# Patient Record
Sex: MALE | Race: WHITE | NOT HISPANIC OR LATINO | Employment: OTHER | URBAN - METROPOLITAN AREA
[De-identification: names, ages, dates, MRNs, and addresses within clinical notes are randomized per-mention and may not be internally consistent; named-entity substitution may affect disease eponyms.]

---

## 2021-11-08 ENCOUNTER — APPOINTMENT (OUTPATIENT)
Dept: GENERAL RADIOLOGY | Facility: HOSPITAL | Age: 71
End: 2021-11-08

## 2021-11-08 ENCOUNTER — HOSPITAL ENCOUNTER (INPATIENT)
Facility: HOSPITAL | Age: 71
LOS: 9 days | Discharge: NURSING, REHAB OR LONG-TERM CARE | End: 2021-11-17
Attending: EMERGENCY MEDICINE | Admitting: HOSPITALIST

## 2021-11-08 DIAGNOSIS — E87.20 LACTIC ACIDOSIS: ICD-10-CM

## 2021-11-08 DIAGNOSIS — I95.9 HYPOTENSION, UNSPECIFIED HYPOTENSION TYPE: ICD-10-CM

## 2021-11-08 DIAGNOSIS — N17.9 AKI (ACUTE KIDNEY INJURY) (HCC): Primary | ICD-10-CM

## 2021-11-08 DIAGNOSIS — E87.0 HYPERNATREMIA: ICD-10-CM

## 2021-11-08 DIAGNOSIS — A41.9 SEPSIS, DUE TO UNSPECIFIED ORGANISM, UNSPECIFIED WHETHER ACUTE ORGAN DYSFUNCTION PRESENT (HCC): ICD-10-CM

## 2021-11-08 DIAGNOSIS — R77.8 ELEVATED TROPONIN: ICD-10-CM

## 2021-11-08 LAB
ALBUMIN SERPL-MCNC: 5.1 G/DL (ref 3.5–5.2)
ALBUMIN/GLOB SERPL: 1.2 G/DL
ALP SERPL-CCNC: 112 U/L (ref 39–117)
ALT SERPL W P-5'-P-CCNC: 112 U/L (ref 1–41)
AMMONIA BLD-SCNC: 26 UMOL/L (ref 16–60)
ANION GAP SERPL CALCULATED.3IONS-SCNC: 25.4 MMOL/L (ref 5–15)
AST SERPL-CCNC: 61 U/L (ref 1–40)
BACTERIA UR QL AUTO: ABNORMAL /HPF
BASOPHILS # BLD AUTO: 0.04 10*3/MM3 (ref 0–0.2)
BASOPHILS NFR BLD AUTO: 0.3 % (ref 0–1.5)
BILIRUB SERPL-MCNC: 2.9 MG/DL (ref 0–1.2)
BILIRUB UR QL STRIP: ABNORMAL
BUN SERPL-MCNC: 110 MG/DL (ref 8–23)
BUN/CREAT SERPL: 21.9 (ref 7–25)
CALCIUM SPEC-SCNC: 11.3 MG/DL (ref 8.6–10.5)
CHLORIDE SERPL-SCNC: 125 MMOL/L (ref 98–107)
CLARITY UR: CLEAR
CO2 SERPL-SCNC: 19.6 MMOL/L (ref 22–29)
COLOR UR: ABNORMAL
CREAT SERPL-MCNC: 5.03 MG/DL (ref 0.76–1.27)
D-LACTATE SERPL-SCNC: 4.3 MMOL/L (ref 0.5–2)
D-LACTATE SERPL-SCNC: 5.3 MMOL/L (ref 0.5–2)
DEPRECATED RDW RBC AUTO: 52.2 FL (ref 37–54)
EOSINOPHIL # BLD AUTO: 0 10*3/MM3 (ref 0–0.4)
EOSINOPHIL NFR BLD AUTO: 0 % (ref 0.3–6.2)
ERYTHROCYTE [DISTWIDTH] IN BLOOD BY AUTOMATED COUNT: 16.6 % (ref 12.3–15.4)
GFR SERPL CREATININE-BSD FRML MDRD: 11 ML/MIN/1.73
GFR SERPL CREATININE-BSD FRML MDRD: ABNORMAL ML/MIN/{1.73_M2}
GLOBULIN UR ELPH-MCNC: 4.2 GM/DL
GLUCOSE SERPL-MCNC: 146 MG/DL (ref 65–99)
GLUCOSE UR STRIP-MCNC: NEGATIVE MG/DL
HCT VFR BLD AUTO: 70.8 % (ref 37.5–51)
HGB BLD-MCNC: 22.5 G/DL (ref 13–17.7)
HGB UR QL STRIP.AUTO: ABNORMAL
HYALINE CASTS UR QL AUTO: ABNORMAL /LPF
IMM GRANULOCYTES # BLD AUTO: 0.02 10*3/MM3 (ref 0–0.05)
IMM GRANULOCYTES NFR BLD AUTO: 0.2 % (ref 0–0.5)
KETONES UR QL STRIP: NEGATIVE
LEUKOCYTE ESTERASE UR QL STRIP.AUTO: ABNORMAL
LYMPHOCYTES # BLD AUTO: 1.53 10*3/MM3 (ref 0.7–3.1)
LYMPHOCYTES NFR BLD AUTO: 13.4 % (ref 19.6–45.3)
MCH RBC QN AUTO: 30.9 PG (ref 26.6–33)
MCHC RBC AUTO-ENTMCNC: 31.8 G/DL (ref 31.5–35.7)
MCV RBC AUTO: 97.3 FL (ref 79–97)
MONOCYTES # BLD AUTO: 0.64 10*3/MM3 (ref 0.1–0.9)
MONOCYTES NFR BLD AUTO: 5.6 % (ref 5–12)
NEUTROPHILS NFR BLD AUTO: 80.5 % (ref 42.7–76)
NEUTROPHILS NFR BLD AUTO: 9.23 10*3/MM3 (ref 1.7–7)
NITRITE UR QL STRIP: POSITIVE
NRBC BLD AUTO-RTO: 0 /100 WBC (ref 0–0.2)
NT-PROBNP SERPL-MCNC: 3089 PG/ML (ref 0–900)
PH UR STRIP.AUTO: 5.5 [PH] (ref 5–8)
PHOSPHATE SERPL-MCNC: 7.7 MG/DL (ref 2.5–4.5)
PLATELET # BLD AUTO: 182 10*3/MM3 (ref 140–450)
PMV BLD AUTO: 12.4 FL (ref 6–12)
POTASSIUM SERPL-SCNC: 5 MMOL/L (ref 3.5–5.2)
PROCALCITONIN SERPL-MCNC: 0.7 NG/ML (ref 0–0.25)
PROT SERPL-MCNC: 9.3 G/DL (ref 6–8.5)
PROT UR QL STRIP: ABNORMAL
QT INTERVAL: 351 MS
RBC # BLD AUTO: 7.28 10*6/MM3 (ref 4.14–5.8)
RBC # UR: ABNORMAL /HPF
REF LAB TEST METHOD: ABNORMAL
SARS-COV-2 RNA PNL SPEC NAA+PROBE: NOT DETECTED
SODIUM SERPL-SCNC: 170 MMOL/L (ref 136–145)
SP GR UR STRIP: 1.03 (ref 1–1.03)
SQUAMOUS #/AREA URNS HPF: ABNORMAL /HPF
TROPONIN T SERPL-MCNC: 0.07 NG/ML (ref 0–0.03)
UROBILINOGEN UR QL STRIP: ABNORMAL
WBC # BLD AUTO: 11.46 10*3/MM3 (ref 3.4–10.8)
WBC UR QL AUTO: ABNORMAL /HPF

## 2021-11-08 PROCEDURE — 85025 COMPLETE CBC W/AUTO DIFF WBC: CPT | Performed by: EMERGENCY MEDICINE

## 2021-11-08 PROCEDURE — 82140 ASSAY OF AMMONIA: CPT | Performed by: EMERGENCY MEDICINE

## 2021-11-08 PROCEDURE — 81001 URINALYSIS AUTO W/SCOPE: CPT | Performed by: EMERGENCY MEDICINE

## 2021-11-08 PROCEDURE — 87635 SARS-COV-2 COVID-19 AMP PRB: CPT | Performed by: PHYSICIAN ASSISTANT

## 2021-11-08 PROCEDURE — 25010000002 CEFTRIAXONE PER 250 MG: Performed by: PHYSICIAN ASSISTANT

## 2021-11-08 PROCEDURE — 93010 ELECTROCARDIOGRAM REPORT: CPT | Performed by: INTERNAL MEDICINE

## 2021-11-08 PROCEDURE — 80053 COMPREHEN METABOLIC PANEL: CPT | Performed by: EMERGENCY MEDICINE

## 2021-11-08 PROCEDURE — 83880 ASSAY OF NATRIURETIC PEPTIDE: CPT | Performed by: EMERGENCY MEDICINE

## 2021-11-08 PROCEDURE — 99291 CRITICAL CARE FIRST HOUR: CPT

## 2021-11-08 PROCEDURE — 84100 ASSAY OF PHOSPHORUS: CPT | Performed by: EMERGENCY MEDICINE

## 2021-11-08 PROCEDURE — 99285 EMERGENCY DEPT VISIT HI MDM: CPT

## 2021-11-08 PROCEDURE — 71045 X-RAY EXAM CHEST 1 VIEW: CPT

## 2021-11-08 PROCEDURE — 83605 ASSAY OF LACTIC ACID: CPT | Performed by: EMERGENCY MEDICINE

## 2021-11-08 PROCEDURE — 87040 BLOOD CULTURE FOR BACTERIA: CPT | Performed by: EMERGENCY MEDICINE

## 2021-11-08 PROCEDURE — 84145 PROCALCITONIN (PCT): CPT | Performed by: EMERGENCY MEDICINE

## 2021-11-08 PROCEDURE — 84484 ASSAY OF TROPONIN QUANT: CPT | Performed by: EMERGENCY MEDICINE

## 2021-11-08 PROCEDURE — 87086 URINE CULTURE/COLONY COUNT: CPT | Performed by: EMERGENCY MEDICINE

## 2021-11-08 PROCEDURE — 93005 ELECTROCARDIOGRAM TRACING: CPT | Performed by: EMERGENCY MEDICINE

## 2021-11-08 RX ORDER — FAMOTIDINE 20 MG/1
20 TABLET, FILM COATED ORAL
Status: DISCONTINUED | OUTPATIENT
Start: 2021-11-09 | End: 2021-11-17 | Stop reason: HOSPADM

## 2021-11-08 RX ORDER — SODIUM CHLORIDE 9 MG/ML
150 INJECTION, SOLUTION INTRAVENOUS CONTINUOUS
Status: DISCONTINUED | OUTPATIENT
Start: 2021-11-08 | End: 2021-11-09

## 2021-11-08 RX ORDER — DEXTROSE MONOHYDRATE 50 MG/ML
125 INJECTION, SOLUTION INTRAVENOUS CONTINUOUS
Status: DISCONTINUED | OUTPATIENT
Start: 2021-11-08 | End: 2021-11-11

## 2021-11-08 RX ADMIN — CEFTRIAXONE 1 G: 1 INJECTION, POWDER, FOR SOLUTION INTRAMUSCULAR; INTRAVENOUS at 17:47

## 2021-11-08 RX ADMIN — SODIUM CHLORIDE 150 ML/HR: 9 INJECTION, SOLUTION INTRAVENOUS at 18:47

## 2021-11-08 RX ADMIN — SODIUM CHLORIDE 1809 ML: 9 INJECTION, SOLUTION INTRAVENOUS at 15:46

## 2021-11-08 RX ADMIN — DEXTROSE MONOHYDRATE 125 ML/HR: 50 INJECTION, SOLUTION INTRAVENOUS at 22:33

## 2021-11-08 RX ADMIN — SODIUM CHLORIDE 1000 ML: 9 INJECTION, SOLUTION INTRAVENOUS at 19:36

## 2021-11-08 NOTE — ED PROVIDER NOTES
Pt presents to the ED c/o  1 month of generalized weakness, not eating and drinking.  He has a history of autism per family.  Family reports he has not been eating or drinking anything for most a month.     On exam,   Awake and alert, appears ill but not toxic appearing.  Mucous membranes are extremely dry with cracking of the lips.  Patient is able to follow simple commands.  He is a poor historian.     Plan: Patient has severely deranged electrolytes and also acute renal failure, all likely from severe dehydration.  We will order IV fluids.  Procalcitonin is elevated so he will be given empiric antibiotics as well.  I have added a serum phosphorus as he may be at risk for refeeding syndrome.      I wore an N95 mask, face shield, and gloves during this patient encounter.  Patient also wearing a surgical mask.  Hand hygeine performed before and after seeing the patient.     Attestation:  The ARACELI and I have discussed this patient's history, physical exam, and treatment plan.  I have reviewed the documentation and personally had a face to face interaction with the patient. I affirm the documentation and agree with the treatment and plan.  The attached note describes my personal findings.            Chaz Jackson MD  11/08/21 2429

## 2021-11-08 NOTE — ED TRIAGE NOTES
Pt was brought in by ems from home for generalized weakness and low blood pressure over the last couple days. Ems was called yesterday for same but was not transported. Denies fever, but reports abnormal urine    Pt wearing mask on arrival. Staff wearing mask and goggles at time of triage.

## 2021-11-08 NOTE — ED PROVIDER NOTES
EMERGENCY DEPARTMENT ENCOUNTER    Room Number:  E669/1  Date of encounter:  11/8/2021  PCP: Brandon Rosen APRN  Historian: Patient, sister      I used full protective equipment while examining this patient.  This includes face mask, gloves and protective eyewear.  I washed my hands before entering the room and immediately upon leaving the room      HPI:  Chief Complaint: Weakness, failure to thrive  A complete HPI/ROS/PMH/PSH/SH/FH are unobtainable due to: Altered mental status    Context: Pedro Peck is a 71 y.o. male who presents to the ED c/o approximate 1 month history of generalized weakness, failure to thrive, decline in overall health.  Patient has history of autism and has chronic cognitive deficits.  Patient does apparently live by himself and can normally care for himself.  Over the past month patient has not been eating or drinking anything.  The patient himself denies any fever, chest pain, vomiting, diarrhea.  He appears cachectic and dehydrated.  The sister reports he went to his primary care physician 2 weeks ago and was started on an antidepressant.  Paroxetine is the only medication that he takes.    Review of Medical Records  No previous pertinent medical records found in [facility].    PAST MEDICAL HISTORY  Active Ambulatory Problems     Diagnosis Date Noted   • No Active Ambulatory Problems     Resolved Ambulatory Problems     Diagnosis Date Noted   • No Resolved Ambulatory Problems     No Additional Past Medical History         PAST SURGICAL HISTORY  History reviewed. No pertinent surgical history.      FAMILY HISTORY  History reviewed. No pertinent family history.      SOCIAL HISTORY  Social History     Socioeconomic History   • Marital status: Single   Tobacco Use   • Smoking status: Never Smoker   • Smokeless tobacco: Never Used   Substance and Sexual Activity   • Alcohol use: Not Currently   • Drug use: Never   • Sexual activity: Defer         ALLERGIES  Patient has no known  allergies.        REVIEW OF SYSTEMS  Unobtainable secondary to altered mental status      PHYSICAL EXAM    I have reviewed the triage vital signs and nursing notes.    ED Triage Vitals [11/08/21 1452]   Temp Heart Rate Resp BP SpO2   98.4 °F (36.9 °C) 117 20 (!) 88/50 96 %      Temp src Heart Rate Source Patient Position BP Location FiO2 (%)   -- Monitor -- -- --       Physical Exam    GENERAL: Awake and alert, acutely ill-appearing cachectic, not distressed  HENT: head atraumatic, dry oral mucosa  EYES: Icteric sclera, EOMI  CV: regular rhythm, tachycardic rate, no murmur  RESPIRATORY: normal effort, CTA  ABDOMEN: soft, nontender  MUSCULOSKELETAL: no deformity, FROM, no calf swelling or tenderness  NEURO: alert, slurred speech, moves all extremities, follows commands  SKIN: warm, dry        LAB RESULTS  Recent Results (from the past 24 hour(s))   Comprehensive Metabolic Panel    Collection Time: 11/08/21  3:23 PM    Specimen: Blood   Result Value Ref Range    Glucose 146 (H) 65 - 99 mg/dL     (H) 8 - 23 mg/dL    Creatinine 5.03 (H) 0.76 - 1.27 mg/dL    Sodium 170 (C) 136 - 145 mmol/L    Potassium 5.0 3.5 - 5.2 mmol/L    Chloride 125 (H) 98 - 107 mmol/L    CO2 19.6 (L) 22.0 - 29.0 mmol/L    Calcium 11.3 (H) 8.6 - 10.5 mg/dL    Total Protein 9.3 (H) 6.0 - 8.5 g/dL    Albumin 5.10 3.50 - 5.20 g/dL    ALT (SGPT) 112 (H) 1 - 41 U/L    AST (SGOT) 61 (H) 1 - 40 U/L    Alkaline Phosphatase 112 39 - 117 U/L    Total Bilirubin 2.9 (H) 0.0 - 1.2 mg/dL    eGFR Non African Amer 11 (L) >60 mL/min/1.73    eGFR  African Amer      Globulin 4.2 gm/dL    A/G Ratio 1.2 g/dL    BUN/Creatinine Ratio 21.9 7.0 - 25.0    Anion Gap 25.4 (H) 5.0 - 15.0 mmol/L   Lactic Acid, Plasma    Collection Time: 11/08/21  3:23 PM    Specimen: Blood   Result Value Ref Range    Lactate 5.3 (C) 0.5 - 2.0 mmol/L   Procalcitonin    Collection Time: 11/08/21  3:23 PM    Specimen: Blood   Result Value Ref Range    Procalcitonin 0.70 (H) 0.00 - 0.25  ng/mL   Troponin    Collection Time: 11/08/21  3:23 PM    Specimen: Blood   Result Value Ref Range    Troponin T 0.074 (C) 0.000 - 0.030 ng/mL   BNP    Collection Time: 11/08/21  3:23 PM    Specimen: Blood   Result Value Ref Range    proBNP 3,089.0 (H) 0.0 - 900.0 pg/mL   CBC Auto Differential    Collection Time: 11/08/21  3:23 PM    Specimen: Blood   Result Value Ref Range    WBC 11.46 (H) 3.40 - 10.80 10*3/mm3    RBC 7.28 (H) 4.14 - 5.80 10*6/mm3    Hemoglobin 22.5 (H) 13.0 - 17.7 g/dL    Hematocrit 70.8 (H) 37.5 - 51.0 %    MCV 97.3 (H) 79.0 - 97.0 fL    MCH 30.9 26.6 - 33.0 pg    MCHC 31.8 31.5 - 35.7 g/dL    RDW 16.6 (H) 12.3 - 15.4 %    RDW-SD 52.2 37.0 - 54.0 fl    MPV 12.4 (H) 6.0 - 12.0 fL    Platelets 182 140 - 450 10*3/mm3    Neutrophil % 80.5 (H) 42.7 - 76.0 %    Lymphocyte % 13.4 (L) 19.6 - 45.3 %    Monocyte % 5.6 5.0 - 12.0 %    Eosinophil % 0.0 (L) 0.3 - 6.2 %    Basophil % 0.3 0.0 - 1.5 %    Immature Grans % 0.2 0.0 - 0.5 %    Neutrophils, Absolute 9.23 (H) 1.70 - 7.00 10*3/mm3    Lymphocytes, Absolute 1.53 0.70 - 3.10 10*3/mm3    Monocytes, Absolute 0.64 0.10 - 0.90 10*3/mm3    Eosinophils, Absolute 0.00 0.00 - 0.40 10*3/mm3    Basophils, Absolute 0.04 0.00 - 0.20 10*3/mm3    Immature Grans, Absolute 0.02 0.00 - 0.05 10*3/mm3    nRBC 0.0 0.0 - 0.2 /100 WBC   Ammonia    Collection Time: 11/08/21  3:23 PM    Specimen: Blood   Result Value Ref Range    Ammonia 26 16 - 60 umol/L   Phosphorus    Collection Time: 11/08/21  3:23 PM    Specimen: Blood   Result Value Ref Range    Phosphorus 7.7 (H) 2.5 - 4.5 mg/dL   ECG 12 Lead    Collection Time: 11/08/21  3:46 PM   Result Value Ref Range    QT Interval 351 ms   COVID-19,[facility] IN-HOUSE CEPHEID/ADY NP SWAB IN TRANSPORT MEDIA 8-12 HR TAT - Swab, Nasopharynx    Collection Time: 11/08/21  4:13 PM    Specimen: Nasopharynx; Swab   Result Value Ref Range    COVID19 Not Detected Not Detected - Ref. Range   Urinalysis With Culture If Indicated - Urine, Clean  Catch    Collection Time: 11/08/21  4:24 PM    Specimen: Urine, Clean Catch   Result Value Ref Range    Color, UA Dark Yellow (A) Yellow, Straw    Appearance, UA Clear Clear    pH, UA 5.5 5.0 - 8.0    Specific Gravity, UA 1.026 1.005 - 1.030    Glucose, UA Negative Negative    Ketones, UA Negative Negative    Bilirubin, UA Moderate (2+) (A) Negative    Blood, UA Large (3+) (A) Negative    Protein, UA 30 mg/dL (1+) (A) Negative    Leuk Esterase, UA Small (1+) (A) Negative    Nitrite, UA Positive (A) Negative    Urobilinogen, UA 1.0 E.U./dL 0.2 - 1.0 E.U./dL   Urinalysis, Microscopic Only - Urine, Clean Catch    Collection Time: 11/08/21  4:24 PM    Specimen: Urine, Clean Catch   Result Value Ref Range    RBC, UA Too Numerous to Count (A) None Seen, 0-2 /HPF    WBC, UA 6-12 (A) None Seen, 0-2 /HPF    Bacteria, UA 1+ (A) None Seen /HPF    Squamous Epithelial Cells, UA None Seen None Seen, 0-2 /HPF    Hyaline Casts, UA 3-6 None Seen /LPF    Methodology Manual Light Microscopy    STAT Lactic Acid, Reflex    Collection Time: 11/08/21  6:53 PM    Specimen: Arm, Left; Blood   Result Value Ref Range    Lactate 4.3 (C) 0.5 - 2.0 mmol/L       Ordered the above labs and independently reviewed the results.        RADIOLOGY  XR Chest 1 View    Result Date: 11/8/2021  ONE VIEW PORTABLE CHEST  HISTORY: Shortness of breath.  FINDINGS: The lungs are well-expanded and clear and the heart and hilar structures are normal. There is no acute disease.  This report was finalized on 11/8/2021 5:09 PM by Dr. Vijay Fernandez M.D.        I ordered the above noted radiological studies. Reviewed by me and discussed with radiologist.  See dictation for official radiology interpretation.      MEDICATIONS GIVEN IN ER    Medications   sodium chloride 0.9 % infusion (150 mL/hr Intravenous Currently Infusing 11/8/21 2056)   dextrose (D5W) 5 % infusion (125 mL/hr Intravenous New Bag 11/8/21 2233)   cefTRIAXone (ROCEPHIN) 1 g in sodium chloride 0.9 % 100  mL IVPB-VTB (has no administration in time range)   famotidine (PEPCID) tablet 20 mg (has no administration in time range)   sodium chloride 0.9 % bolus 1,809 mL (0 mL Intravenous Stopped 11/8/21 1845)   cefTRIAXone (ROCEPHIN) 1 g in sodium chloride 0.9 % 100 mL IVPB-VTB (0 g Intravenous Stopped 11/8/21 1844)   sodium chloride 0.9 % bolus 1,000 mL ( Intravenous Currently Infusing 11/8/21 2056)     Critical Care  Performed by: Shant Parr PA  Authorized by: Chaz Jackson MD     Critical care provider statement:     Critical care time (minutes):  33    Critical care time was exclusive of:  Separately billable procedures and treating other patients    Critical care was necessary to treat or prevent imminent or life-threatening deterioration of the following conditions:  Circulatory failure, dehydration, metabolic crisis, shock and sepsis    Critical care was time spent personally by me on the following activities:  Blood draw for specimens, development of treatment plan with patient or surrogate, discussions with consultants, evaluation of patient's response to treatment, examination of patient, interpretation of cardiac output measurements, obtaining history from patient or surrogate, ordering and performing treatments and interventions, ordering and review of laboratory studies, ordering and review of radiographic studies, pulse oximetry and re-evaluation of patient's condition        PROGRESS, DATA ANALYSIS, CONSULTS, AND MEDICAL DECISION MAKING    All labs have been independently reviewed by me.  All radiology studies have been reviewed by me and discussed with radiologist dictating the report.   EKG's independently viewed and interpreted by me.  Discussion below represents my analysis of pertinent findings related to patient's condition, differential diagnosis, treatment plan and final disposition.    I have discussed case with Dr. Jackson, emergency room physician.  He has performed his own bedside  examination and agrees with treatment plan.    ED Course as of 11/08/21 2259   Mon Nov 08, 2021   1524 Patient presents with 2-week history of progressive weakness, failure to thrive.  Patient was hypotensive and tachycardic here.  He is cachectic appearing.  Differential diagnoses include but not limited to acute renal failure, underlying malignancy, sepsis. [EE]   1545 Chest x-ray interpreted myself shows no acute infiltrate. [EE]   1606 Hemoglobin(!): 22.5  Patient severely dehydrated.  No baseline. [EE]   1607 EKG          EKG time: 1546  Rhythm/Rate: Sinus tach, 107  P waves and WI: Biatrial enlargement, normal WI interval  QRS, axis: Left axis deviation  ST and T waves: No acute ischemic changes    Interpreted Contemporaneously by me, independently viewed         [JR]   1610 Lactate(!!): 5.3 [EE]   1610 Troponin T(!!): 0.074 [EE]   1701 Sodium(!!): 170 [EE]   1701 Creatinine(!): 5.03 [EE]   1708 Recheck of patient.  His vitals have improved.  He is no longer tachycardic.  I had a discussion with patient's sister.  He does not have any advanced directives.  He is a full code at this time.  She plans to discuss this with her other brother who is an . [EE]   1721 I discussed case with Dr. Ga.  He agrees to admit the patient.  He request that we consult Dr. Farrar in nephrology. [EE]   1727 I discussed case with Dr. Finn, nephrology.  She will consult on the patient.  She requests normal saline 150 mL/hr be given. [EE]      ED Course User Index  [EE] Shant Parr PA  [JR] Chaz Jackson MD       AS OF 22:59 EST VITALS:    BP - (!) 82/44  HR - 83  TEMP - 97.8 °F (36.6 °C) (Temporal)  O2 SATS - 97%        DIAGNOSIS  Final diagnoses:   DERECK (acute kidney injury) (HCC)   Hypotension, unspecified hypotension type   Sepsis, due to unspecified organism, unspecified whether acute organ dysfunction present (HCC)   Hypernatremia   Lactic acidosis   Elevated troponin         DISPOSITION  Admitted            Shant Parr PA  11/08/21 6440

## 2021-11-08 NOTE — ED NOTES
"Per sister, patient has been sleeping 23 of 24 hours for the past 2-4 weeks.  Sister stated that patient went to his doctor and was told he had something wrong with his kidneys and that he was depressed.  Per sister, patient was started on antidepressants but he has not been taking them.  Sister stated that he has a very low IQ but lives on his own.    Pt appears emaciated, + yellow sclera, + abrasions over various areas of his body and face (possiblity of falls but patient does not know).  Per EMS, patient's bp was in the 70's for them.  EMS stated that the patient attempted to walk when they arrived at his apartment but he was found to be \"wobbly.\"    Pt in a surgical mask.    This nurse in gloves, goggles, and N95.     Diann Parks, RN  11/08/21 1346    "

## 2021-11-09 ENCOUNTER — APPOINTMENT (OUTPATIENT)
Dept: ULTRASOUND IMAGING | Facility: HOSPITAL | Age: 71
End: 2021-11-09

## 2021-11-09 LAB
ALBUMIN SERPL-MCNC: 3.5 G/DL (ref 3.5–5.2)
ANION GAP SERPL CALCULATED.3IONS-SCNC: 13.7 MMOL/L (ref 5–15)
BUN SERPL-MCNC: 107 MG/DL (ref 8–23)
BUN/CREAT SERPL: 32.4 (ref 7–25)
CALCIUM SPEC-SCNC: 8.9 MG/DL (ref 8.6–10.5)
CHLORIDE SERPL-SCNC: 126 MMOL/L (ref 98–107)
CHLORIDE UR-SCNC: 50 MMOL/L
CK SERPL-CCNC: 480 U/L (ref 20–200)
CK SERPL-CCNC: 512 U/L (ref 20–200)
CO2 SERPL-SCNC: 20.3 MMOL/L (ref 22–29)
CREAT SERPL-MCNC: 3.3 MG/DL (ref 0.76–1.27)
CREAT UR-MCNC: 157 MG/DL
DEPRECATED RDW RBC AUTO: 50.7 FL (ref 37–54)
ERYTHROCYTE [DISTWIDTH] IN BLOOD BY AUTOMATED COUNT: 13.8 % (ref 12.3–15.4)
GFR SERPL CREATININE-BSD FRML MDRD: 19 ML/MIN/1.73
GLUCOSE SERPL-MCNC: 192 MG/DL (ref 65–99)
HCT VFR BLD AUTO: 46.3 % (ref 37.5–51)
HGB BLD-MCNC: 14.8 G/DL (ref 13–17.7)
MCH RBC QN AUTO: 31.5 PG (ref 26.6–33)
MCHC RBC AUTO-ENTMCNC: 32 G/DL (ref 31.5–35.7)
MCV RBC AUTO: 98.5 FL (ref 79–97)
PHOSPHATE SERPL-MCNC: 3.6 MG/DL (ref 2.5–4.5)
PLATELET # BLD AUTO: 105 10*3/MM3 (ref 140–450)
PMV BLD AUTO: 12.2 FL (ref 6–12)
POTASSIUM SERPL-SCNC: 3.9 MMOL/L (ref 3.5–5.2)
PROT UR-MCNC: 28 MG/DL
RBC # BLD AUTO: 4.7 10*6/MM3 (ref 4.14–5.8)
SODIUM SERPL-SCNC: 160 MMOL/L (ref 136–145)
SODIUM UR-SCNC: 64 MMOL/L
WBC # BLD AUTO: 8.78 10*3/MM3 (ref 3.4–10.8)

## 2021-11-09 PROCEDURE — 80069 RENAL FUNCTION PANEL: CPT | Performed by: INTERNAL MEDICINE

## 2021-11-09 PROCEDURE — 92610 EVALUATE SWALLOWING FUNCTION: CPT

## 2021-11-09 PROCEDURE — 82550 ASSAY OF CK (CPK): CPT | Performed by: HOSPITALIST

## 2021-11-09 PROCEDURE — 82436 ASSAY OF URINE CHLORIDE: CPT | Performed by: INTERNAL MEDICINE

## 2021-11-09 PROCEDURE — 25010000002 CEFTRIAXONE PER 250 MG: Performed by: HOSPITALIST

## 2021-11-09 PROCEDURE — 82570 ASSAY OF URINE CREATININE: CPT | Performed by: INTERNAL MEDICINE

## 2021-11-09 PROCEDURE — 76775 US EXAM ABDO BACK WALL LIM: CPT

## 2021-11-09 PROCEDURE — 84156 ASSAY OF PROTEIN URINE: CPT | Performed by: INTERNAL MEDICINE

## 2021-11-09 PROCEDURE — 82550 ASSAY OF CK (CPK): CPT | Performed by: INTERNAL MEDICINE

## 2021-11-09 PROCEDURE — 84300 ASSAY OF URINE SODIUM: CPT | Performed by: INTERNAL MEDICINE

## 2021-11-09 PROCEDURE — 85027 COMPLETE CBC AUTOMATED: CPT | Performed by: HOSPITALIST

## 2021-11-09 RX ORDER — ASPIRIN 81 MG/1
81 TABLET, CHEWABLE ORAL DAILY
Status: DISCONTINUED | OUTPATIENT
Start: 2021-11-09 | End: 2021-11-17 | Stop reason: HOSPADM

## 2021-11-09 RX ADMIN — FAMOTIDINE 20 MG: 20 TABLET, FILM COATED ORAL at 07:10

## 2021-11-09 RX ADMIN — CEFTRIAXONE 1 G: 1 INJECTION, POWDER, FOR SOLUTION INTRAMUSCULAR; INTRAVENOUS at 18:33

## 2021-11-09 RX ADMIN — DEXTROSE MONOHYDRATE 175 ML/HR: 50 INJECTION, SOLUTION INTRAVENOUS at 16:44

## 2021-11-09 RX ADMIN — DEXTROSE MONOHYDRATE 175 ML/HR: 50 INJECTION, SOLUTION INTRAVENOUS at 23:18

## 2021-11-09 RX ADMIN — FAMOTIDINE 20 MG: 20 TABLET, FILM COATED ORAL at 18:33

## 2021-11-09 RX ADMIN — DEXTROSE MONOHYDRATE 125 ML/HR: 50 INJECTION, SOLUTION INTRAVENOUS at 07:54

## 2021-11-09 RX ADMIN — ASPIRIN 81 MG: 81 TABLET, CHEWABLE ORAL at 18:33

## 2021-11-09 NOTE — CONSULTS
"Adult Nutrition  Assessment/PES    Patient Name:  Pedro Peck  YOB: 1950  MRN: [id number]  Admit Date:  11/8/2021    Assessment Date:  11/9/2021    Comments:  Nutrition consult due to unintentional weight loss + consult for calorie count.  Admitted with weakness, failure to thrive, DERECK, severe dehydration, UTI, hypernatremia.  Weakness x 1 month per chart review.    Patient reports appetite improving.  He reports poor appetite over the past week or so prior to admission.  He reports UBW around 153#, but unsure how long ago this was, thinks within a month.  This is a 19# (12.4%) weight loss.  Nutrition focused physical exam completed - severe muscle wasting and fat loss noted.  MSA completed for severe malnutrition.    2 day calorie count to start today at dinner.  Estimated needs: 8847-5018 kcal/day, 73-91 grams protein/day.    Patient agreeable to Boost Plus - adding TID.    RD to continue to follow.     Reason for Assessment     Row Name 11/09/21 1542          Reason for Assessment    Reason For Assessment physician consult; calorie count order     Diagnosis renal disease; infection/sepsis; other (see comments)  no PMH; adm with weakness, FTT, DERECK, severe dehydration, UTI     Identified At Risk by Screening Criteria unintentional loss of 10 lbs or more in the past 2 mos                Nutrition/Diet History     Row Name 11/09/21 1543          Nutrition/Diet History    Typical Food/Fluid Intake 100% x 1 meal; pt reports flora improving, says it wasn't very good over the past week or so     Supplemental Drinks/Foods/Additives agreeable to trying Boost Plus vanilla - adding TID                Anthropometrics     Row Name 11/09/21 1544          Anthropometrics    Height 177.8 cm (70\")            Admit Weight    Admit Weight --  134# 11/8            Ideal Body Weight (IBW)    Ideal Body Weight (IBW) (kg) 76.48     % of Ideal Body Weight Assessment 80-90%: mild deficit  79.5%            Usual Body Weight " "(UBW)    Usual Body Weight 69.4 kg (153 lb)     % of Usual Body Weight Assessment 85-95% - mild deficit  87.6%     Weight Loss unintentional  19# (12.4%)     Weight Loss Time Frame patient unsure how long, but thinks within a month            Body Mass Index (BMI)    BMI Assessment BMI 18.5-24.9: normal  19.19                Labs/Tests/Procedures/Meds     Row Name 11/09/21 1545          Labs/Procedures/Meds    Lab Results Reviewed reviewed, pertinent     Lab Results Comments Gluc, Na, Creat, BUN, GFR            Diagnostic Tests/Procedures    Diagnostic Test/Procedure Reviewed reviewed, pertinent            Medications    Pertinent Medications Reviewed reviewed, pertinent     Pertinent Medications Comments pepcid, IVFs                Physical Findings     Row Name 11/09/21 1546          Physical Findings    Overall Physical Appearance generalized wasting; loss of subcutaneous fat; loss of muscle mass     Skin --  B=19, intact                Estimated/Assessed Needs     Row Name 11/09/21 1546 11/09/21 1544       Calculation Measurements    Weight Used For Calculations 60.8 kg (134 lb 0.6 oz) --    Height -- 177.8 cm (70\")       Estimated/Assessed Needs       KCAL/KG    KCAL/KG 30 Kcal/Kg (kcal); 35 Kcal/Kg (kcal) --    30 Kcal/Kg (kcal) 1824 --    35 Kcal/Kg (kcal) 2128 --       Protein Requirements    Weight Used For Protein Calculations 60.8 kg (134 lb 0.6 oz) --    Est Protein Requirement Amount (gms/kg) 1.5 gm protein  73-91 --    Estimated Protein Requirements (gms/day) 91.2 --       Fluid Requirements    Fluid Requirements (mL/day) 1800 --               Nutrition Prescription Ordered     Row Name 11/09/21 1547          Nutrition Prescription PO    Current PO Diet Regular                Evaluation of Received Nutrient/Fluid Intake     Row Name 11/09/21 1547          PO Evaluation    Number of Meals 1     % PO Intake 100                Malnutrition Severity Assessment     Row Name 11/09/21 1547          " Malnutrition Severity Assessment    Malnutrition Type Starvation - Related Malnutrition            Insufficient Energy Intake     Insufficient Energy Intake Findings Severe     Insufficient Energy Intake  < or equal to 50% of est. energy requirement for > or equal to 5d)            Unintentional Weight Loss     Unintentional Weight Loss Findings Severe     Unintentional Weight Loss  Weight loss greater than 5% in one month            Muscle Loss    Loss of Muscle Mass Findings Severe     [place] Region Moderate - slight depression     Clavicle Bone Region Severe - protruding prominent bone     Acromion Bone Region Moderate - acromion may slightly protrude     Dorsal Hand Region Severe - prominent depression     Patellar Region Severe - prominent bone, square looking, very little muscle definition            Fat Loss    Subcutaneous Fat Loss Findings Severe     Orbital Region  Severe - pronounced hollowness/depression, dark circles, loose saggy skin     Upper Arm Region Severe - mostly skin, very little space between folds, fingers touch            Criteria Met (Must meet criteria for severity in at least 2 of these categories: M Wasting, Fat Loss, Fluid, Secondary Signs, Wt. Status, Intake)    Patient meets criteria for  Severe Malnutrition               Problem/Interventions:   Problem 1     Row Name 11/09/21 1549          Nutrition Diagnoses Problem 1    Problem 1 Malnutrition     Etiology (related to) Factors Affecting Nutrition; Medical Diagnosis     Infectious Disease UTI     Renal DERECK     Appetite Poor     Signs/Symptoms (evidenced by) Unintended Weight Change; Report/Observation; % UBW; % IBW; Report of Mnimal PO Intake     Percent (%) IBW 79.5 %     Percent (%) UBW 87.6 %     Unintended Weight Change Loss     Number of Pounds Lost 19     Weight loss time period 1 month     Reported/Observed By Patient                Intervention Goal     Row Name 11/09/21 1247          Intervention Goal    General Maintain  nutrition; Disease management/therapy; Meet nutritional needs for age/condition     PO Maintain intake     Weight Appropriate weight gain                Nutrition Intervention     Row Name 11/09/21 1550          Nutrition Intervention    RD/Tech Action Follow Tx progress; Care plan reviewd; Encourage intake; Recommend/ordered     Recommended/Ordered Supplement                Nutrition Prescription     Row Name 11/09/21 1550          Nutrition Prescription PO    PO Prescription Begin/change supplement     Supplement Boost Plus     Supplement Frequency 3 times a day     New PO Prescription Ordered? Yes            Other Orders    Calorie Count 2 day     Calorie Count Ordered? Yes  MD consult                Education/Evaluation     Row Name 11/09/21 1570          Education    Education Will Instruct as appropriate            Monitor/Evaluation    Monitor Per protocol; PO intake; Supplement intake; Pertinent labs; Weight; Symptoms                 Electronically signed by:  Daria Riddle RD  11/09/21 15:51 EST

## 2021-11-09 NOTE — CASE MANAGEMENT/SOCIAL WORK
Discharge Planning Assessment  [facility]     Patient Name: Pedro Peck  MRN: [id number]  Today's Date: 11/9/2021    Admit Date: 11/8/2021     Discharge Needs Assessment     Row Name 11/09/21 0850       Living Environment    Lives With alone    Primary Care Provided by self    Provides Primary Care For no one, unable/limited ability to care for self    Family Caregiver if Needed sibling(s)    Family Caregiver Names Sister  ( Malena Lopez [phone])    Quality of Family Relationships helpful; involved; supportive    Able to Return to Prior Arrangements no    Living Arrangement Comments Pt lives alone in a two condo.       Resource/Environmental Concerns    Resource/Environmental Concerns none    Transportation Concerns car, none       Transition Planning    Patient/Family Anticipates Transition to inpatient rehabilitation facility    Patient/Family Anticipated Services at Transition none    Transportation Anticipated family or friend will provide       Discharge Needs Assessment    Readmission Within the Last 30 Days no previous admission in last 30 days    Equipment Currently Used at Home none    Concerns to be Addressed denies needs/concerns at this time; basic needs    Anticipated Changes Related to Illness inability to care for self    Equipment Needed After Discharge none               Discharge Plan     Row Name 11/09/21 0859       Plan    Plan CCP to follow for needs.   YOSSI Bauer RN    Patient/Family in Agreement with Plan yes    Plan Comments FACE SHEET VERIFIED/ IM LETTER SIGNED.  Spoke to pt at bedside. Pt gave CCP permission to call his sister.  Called and spoke with his sister (Malena Reyes  [phone]).   Pt's PCP is JOAQUIN Goldman. Pt lives alone in a single story house.  Pt has been independent with ADLs.  Pt gets his prescriptions at [facility] on [facility].  Pt does not have any issues affording medications.  Pt is not current with HH.  Pt has not in SNF.   Pt's  sister states she and her daughter assists pt as needed.  CCP will follow for pt discharge needs.   YOSSI Bauer RN              Continued Care and Services - Admitted Since 11/8/2021    Coordination has not been started for this encounter.          Demographic Summary     Row Name 11/09/21 0849       General Information    Admission Type inpatient    Arrived From emergency department    Required Notices Provided Important Message from Medicare    Referral Source admission list    Reason for Consult discharge planning    Preferred Language English     Used During This Interaction no               Functional Status     Row Name 11/09/21 0849       Functional Status    Usual Activity Tolerance moderate    Current Activity Tolerance poor       Functional Status, IADL    Medications independent    Meal Preparation independent    Housekeeping independent    Laundry independent    Shopping independent       Mental Status    General Appearance WDL WDL               Psychosocial    No documentation.                Abuse/Neglect    No documentation.                Legal    No documentation.                Substance Abuse    No documentation.                Patient Forms    No documentation.                   Janice Bauer, RN

## 2021-11-09 NOTE — PROGRESS NOTES
Malnutrition Severity Assessment    Patient Name:  Pedro Peck  YOB: 1950  MRN: [id number]  Admit Date:  11/8/2021    Patient meets criteria for : Severe Malnutrition    Comments:  79.5% IBW, 87.6% UBW, 19# (12.4%) weight loss x 1 month, decreased PO intake.    Nutrition focused physical exam completed and detailed in chart below.    Malnutrition Severity Assessment  Malnutrition Type: Starvation - Related Malnutrition  Malnutrition Type (last 8 hours)     Malnutrition Severity Assessment     Row Name 11/09/21 1547       Malnutrition Severity Assessment    Malnutrition Type Starvation - Related Malnutrition    Row Name 11/09/21 1547       Insufficient Energy Intake     Insufficient Energy Intake Findings Severe    Insufficient Energy Intake  < or equal to 50% of est. energy requirement for > or equal to 5d)    Row Name 11/09/21 1547       Unintentional Weight Loss     Unintentional Weight Loss Findings Severe    Unintentional Weight Loss  Weight loss greater than 5% in one month    Row Name 11/09/21 1547       Muscle Loss    Loss of Muscle Mass Findings Severe    Christian Region Moderate - slight depression    Clavicle Bone Region Severe - protruding prominent bone    Acromion Bone Region Moderate - acromion may slightly protrude    Dorsal Hand Region Severe - prominent depression    Patellar Region Severe - prominent bone, square looking, very little muscle definition    Row Name 11/09/21 1547       Fat Loss    Subcutaneous Fat Loss Findings Severe    Orbital Region  Severe - pronounced hollowness/depression, dark circles, loose saggy skin    Upper Arm Region Severe - mostly skin, very little space between folds, fingers touch    Row Name 11/09/21 1547       Criteria Met (Must meet criteria for severity in at least 2 of these categories: M Wasting, Fat Loss, Fluid, Secondary Signs, Wt. Status, Intake)    Patient meets criteria for  Severe Malnutrition                Electronically signed by:  Daria  JOHANNY Riddle  11/09/21 15:59 EST

## 2021-11-09 NOTE — H&P
"History and physical    Primary care physician  Dr. NUNES    Chief complaint  Generalized weakness  Failure to thrive    History of present illness  71-year-old white male with no medical problems and no home medication brought to the emergency room by the family with generalized weakness for 1 month failure to thrive not eating drinking for several days to week.  Patient has no fever cough chest pain shortness of breath abdominal pain nausea vomiting diarrhea.  Patient looks cachectic and work-up in ER revealed acute kidney injury with severe dehydration and hypernatremia also found to have UTI admit for management.  Patient remained fully awake alert and oriented x3.    PAST MEDICAL HISTORY   Unremarkable      PAST SURGICAL HISTORY     History reviewed. No pertinent surgical history.     FAMILY HISTORY  History reviewed. No pertinent family history.     SOCIAL HISTORY                Socioeconomic History   • Marital status: Single   Tobacco Use   • Smoking status: Never Smoker   • Smokeless tobacco: Never Used   Substance and Sexual Activity   • Alcohol use: Not Currently   • Drug use: Never   • Sexual activity: Defer         ALLERGIES  Patient has no known allergies.  No home meds     REVIEW OF SYSTEMS  Unobtainable secondary to altered mental status     PHYSICAL EXAM   Blood pressure 104/74, pulse 85, temperature 97 °F (36.1 °C), temperature source Oral, resp. rate 16, height 177.8 cm (70\"), weight 60.8 kg (134 lb 0.6 oz), SpO2 96 %.    GENERAL: Awake and alert, acutely ill-appearing cachectic, not distressed  HEENT:  Unremarkable  CV: regular rhythm, tachycardic rate, no murmur  RESPIRATORY: normal effort, CTA  ABDOMEN: soft, nontender bowel sounds positive  MUSCULOSKELETAL: no deformity, FROM, no calf swelling or tenderness  NEURO: alert, slurred speech, moves all extremities, follows commands  SKIN: warm, dry     LAB RESULTS  Lab Results (last 24 hours)     Procedure Component Value Units Date/Time    " Renal Function Panel [[id number]]  (Abnormal) Collected: 11/09/21 1101    Specimen: Blood Updated: 11/09/21 1146     Glucose 192 mg/dL       mg/dL      Creatinine 3.30 mg/dL      Sodium 160 mmol/L      Potassium 3.9 mmol/L      Chloride 126 mmol/L      CO2 20.3 mmol/L      Calcium 8.9 mg/dL      Albumin 3.50 g/dL      Phosphorus 3.6 mg/dL      Anion Gap 13.7 mmol/L      BUN/Creatinine Ratio 32.4     eGFR Non African Amer 19 mL/min/1.73     Narrative:      GFR Normal >60  Chronic Kidney Disease <60  Kidney Failure <15      CK [[id number]]  (Abnormal) Collected: 11/09/21 1101    Specimen: Blood Updated: 11/09/21 1140     Creatine Kinase 480 U/L     CBC (No Diff) [[id number]]  (Abnormal) Collected: 11/09/21 0759    Specimen: Blood Updated: 11/09/21 0831     WBC 8.78 10*3/mm3      RBC 4.70 10*6/mm3      Hemoglobin 14.8 g/dL      Hematocrit 46.3 %      MCV 98.5 fL      MCH 31.5 pg      MCHC 32.0 g/dL      RDW 13.8 %      RDW-SD 50.7 fl      MPV 12.2 fL      Platelets 105 10*3/mm3     STAT Lactic Acid, Reflex [[id number]]  (Abnormal) Collected: 11/08/21 1853    Specimen: Blood from Arm, Left Updated: 11/08/21 1940     Lactate 4.3 mmol/L     Phosphorus [[id number]]  (Abnormal) Collected: 11/08/21 1523    Specimen: Blood Updated: 11/08/21 1759     Phosphorus 7.7 mg/dL     Urinalysis, Microscopic Only - Urine, Clean Catch [[id number]]  (Abnormal) Collected: 11/08/21 1624    Specimen: Urine, Clean Catch Updated: 11/08/21 1723     RBC, UA Too Numerous to Count /HPF      WBC, UA 6-12 /HPF      Bacteria, UA 1+ /HPF      Squamous Epithelial Cells, UA None Seen /HPF      Hyaline Casts, UA 3-6 /LPF      Methodology Manual Light Microscopy    Urine Culture - Urine, Urine, Clean Catch [[id number]] Collected: 11/08/21 1624    Specimen: Urine, Clean Catch Updated: 11/08/21 1723    COVID PRE-OP / PRE-PROCEDURE SCREENING ORDER (NO ISOLATION) - Swab, Nasopharynx [[id number]]  (Normal) Collected: 11/08/21 1613    Specimen: Swab from  Nasopharynx Updated: 11/08/21 1717    Narrative:      The following orders were created for panel order COVID PRE-OP / PRE-PROCEDURE SCREENING ORDER (NO ISOLATION) - Swab, Nasopharynx.  Procedure                               Abnormality         Status                     ---------                               -----------         ------                     COVID-19,[facility]...[[id number]]  Normal              Final result                 Please view results for these tests on the individual orders.    COVID-19,[facility] CEPHEID/ADY NP SWAB IN TRANSPORT MEDIA 8-12 HR TAT - Swab, Nasopharynx [[id number]]  (Normal) Collected: 11/08/21 1613    Specimen: Swab from Nasopharynx Updated: 11/08/21 1717     COVID19 Not Detected    Narrative:      Fact sheet for providers: https://www.fda.gov/media/545300/download    Fact sheet for patients: https://www.fda.gov/media/774990/download    Test performed by PCR.    Ammonia [[id number]]  (Normal) Collected: 11/08/21 1523    Specimen: Blood Updated: 11/08/21 1710     Ammonia 26 umol/L      Comment: Specimen hemolyzed.  Results may be affected.       Urinalysis With Culture If Indicated - Urine, Clean Catch [[id number]]  (Abnormal) Collected: 11/08/21 1624    Specimen: Urine, Clean Catch Updated: 11/08/21 1708     Color, UA Dark Yellow     Appearance, UA Clear     pH, UA 5.5     Specific Gravity, UA 1.026     Glucose, UA Negative     Ketones, UA Negative     Bilirubin, UA Moderate (2+)     Blood, UA Large (3+)     Protein, UA 30 mg/dL (1+)     Leuk Esterase, UA Small (1+)     Nitrite, UA Positive     Urobilinogen, UA 1.0 E.U./dL    Comprehensive Metabolic Panel [[id number]]  (Abnormal) Collected: 11/08/21 1523    Specimen: Blood Updated: 11/08/21 1652     Glucose 146 mg/dL       mg/dL      Creatinine 5.03 mg/dL      Sodium 170 mmol/L      Potassium 5.0 mmol/L      Comment: Slight hemolysis detected by analyzer. Results may be affected.        Chloride 125 mmol/L       "CO2 19.6 mmol/L      Calcium 11.3 mg/dL      Total Protein 9.3 g/dL      Albumin 5.10 g/dL      ALT (SGPT) 112 U/L      AST (SGOT) 61 U/L      Comment: Slight hemolysis detected by analyzer. Results may be affected.        Alkaline Phosphatase 112 U/L      Total Bilirubin 2.9 mg/dL      eGFR Non African Amer 11 mL/min/1.73      Comment: <15 Indicative of kidney failure.        eGFR   Amer --     Comment: <15 Indicative of kidney failure.        Globulin 4.2 gm/dL      A/G Ratio 1.2 g/dL      BUN/Creatinine Ratio 21.9     Anion Gap 25.4 mmol/L     Narrative:      GFR Normal >60  Chronic Kidney Disease <60  Kidney Failure <15      Blood Culture - Blood, Arm, Left [[id number]] Collected: 11/08/21 1611    Specimen: Blood from Arm, Left Updated: 11/08/21 1639    Procalcitonin [[id number]]  (Abnormal) Collected: 11/08/21 1523    Specimen: Blood Updated: 11/08/21 1636     Procalcitonin 0.70 ng/mL     Narrative:      As a Marker for Sepsis (Non-Neonates):     1. <0.5 ng/mL represents a low risk of severe sepsis and/or septic shock.  2. >2 ng/mL represents a high risk of severe sepsis and/or septic shock.    As a Marker for Lower Respiratory Tract Infections that require antibiotic therapy:  PCT on Admission     Antibiotic Therapy             6-12 Hrs later  >0.5                          Strongly Recommended            >0.25 - <0.5             Recommended  0.1 - 0.25                  Discouraged                       Remeasure/reassess PCT  <0.1                         Strongly Discouraged         Remeasure/reassess PCT      As 28 day mortality risk marker: \"Change in Procalcitonin Result\" (>80% or <=80%) if Day 0 (or Day 1) and Day 4 values are available. Refer to http://www.[employer]-pct-calculator.com/    Change in PCT <=80 %   A decrease of PCT levels below or equal to 80% defines a positive change in PCT test result representing a higher risk for 28-day all-cause mortality of patients diagnosed with severe sepsis or " septic shock.    Change in PCT >80 %   A decrease of PCT levels of more than 80% defines a negative change in PCT result representing a lower risk for 28-day all-cause mortality of patients diagnosed with severe sepsis or septic shock.                Lactic Acid, Plasma [[id number]]  (Abnormal) Collected: 11/08/21 1523    Specimen: Blood Updated: 11/08/21 1609     Lactate 5.3 mmol/L     Troponin [[id number]]  (Abnormal) Collected: 11/08/21 1523    Specimen: Blood Updated: 11/08/21 1608     Troponin T 0.074 ng/mL     Narrative:      Troponin T Reference Range:  <= 0.03 ng/mL-   Negative for AMI  >0.03 ng/mL-     Abnormal for myocardial necrosis.  Clinicians would have to utilize clinical acumen, EKG, Troponin and serial changes to determine if it is an Acute Myocardial Infarction or myocardial injury due to an underlying chronic condition.       Results may be falsely decreased if patient taking Biotin.      CBC & Differential [[id number]]  (Abnormal) Collected: 11/08/21 1523    Specimen: Blood Updated: 11/08/21 1606    Narrative:      The following orders were created for panel order CBC & Differential.  Procedure                               Abnormality         Status                     ---------                               -----------         ------                     CBC Auto Differential[[id number]]        Abnormal            Final result                 Please view results for these tests on the individual orders.    CBC Auto Differential [[id number]]  (Abnormal) Collected: 11/08/21 1523    Specimen: Blood Updated: 11/08/21 1606     WBC 11.46 10*3/mm3      RBC 7.28 10*6/mm3      Hemoglobin 22.5 g/dL      Hematocrit 70.8 %      MCV 97.3 fL      MCH 30.9 pg      MCHC 31.8 g/dL      RDW 16.6 %      RDW-SD 52.2 fl      MPV 12.4 fL      Platelets 182 10*3/mm3      Neutrophil % 80.5 %      Lymphocyte % 13.4 %      Monocyte % 5.6 %      Eosinophil % 0.0 %      Basophil % 0.3 %      Immature Grans % 0.2 %       Neutrophils, Absolute 9.23 10*3/mm3      Lymphocytes, Absolute 1.53 10*3/mm3      Monocytes, Absolute 0.64 10*3/mm3      Eosinophils, Absolute 0.00 10*3/mm3      Basophils, Absolute 0.04 10*3/mm3      Immature Grans, Absolute 0.02 10*3/mm3      nRBC 0.0 /100 WBC     BNP [[id number]]  (Abnormal) Collected: 11/08/21 1523    Specimen: Blood Updated: 11/08/21 1605     proBNP 3,089.0 pg/mL     Narrative:      Among patients with dyspnea, NT-proBNP is highly sensitive for the detection of acute congestive heart failure. In addition NT-proBNP of <300 pg/ml effectively rules out acute congestive heart failure with 99% negative predictive value.    Results may be falsely decreased if patient taking Biotin.      Blood Culture - Blood, Arm, Right [[id number]] Collected: 11/08/21 1523    Specimen: Blood from Arm, Right Updated: 11/08/21 1539        Imaging Results (Last 24 Hours)     Procedure Component Value Units Date/Time    XR Chest 1 View [[id number]] Collected: 11/08/21 1614     Updated: 11/08/21 1712    Narrative:      ONE VIEW PORTABLE CHEST     HISTORY: Shortness of breath.     FINDINGS: The lungs are well-expanded and clear and the heart and hilar  structures are normal. There is no acute disease.     This report was finalized on 11/8/2021 5:09 PM by Dr. Vijay Fernandez M.D.                    ECG 12 Lead  Component   Ref Range & Units 11/8/21 1546   QT Interval   ms 351              HEART RATE= 107  bpm  RR Interval= 560  ms  TX Interval= 129  ms  P Horizontal Axis= -10  deg  P Front Axis= 78  deg  QRSD Interval= 71  ms  QT Interval= 351  ms  QRS Axis= -60  deg  T Wave Axis= 57  deg  - ABNORMAL ECG -  Sinus tachycardia  Biatrial enlargement  LAD, consider left anterior fascicular block  ST elevation, consider inferior injury  NO PRIOR TRACING AVAILABLE FOR COMPARISON             Current Facility-Administered Medications:   •  aspirin chewable tablet 81 mg, 81 mg, Oral, Daily, Fernando Ga MD  •  cefTRIAXone (ROCEPHIN)  1 g in sodium chloride 0.9 % 100 mL IVPB-VTB, 1 g, Intravenous, Q24H, Steven Ga MD  •  dextrose (D5W) 5 % infusion, 125 mL/hr, Intravenous, Continuous, Steven Ga MD, Last Rate: 125 mL/hr at 11/09/21 0754, 125 mL/hr at 11/09/21 0754  •  famotidine (PEPCID) tablet 20 mg, 20 mg, Oral, BID AC, Steven Ga MD, 20 mg at 11/09/21 0710     ASSESSMENT  Acute kidney injury  Acute UTI with sepsis  Severe dehydration  Hypernatremia  Hyperkalemia  Elevated troponin with no chest pain  Elevated blood sugar with no history of diabetes  Failure to thrive  Gastroesophageal reflux disease    PLAN  Admit  IVF  IV antibiotics after obtaining the cultures  Nutrition consult  Nephrology to follow patient  Stress ulcer DVT prophylaxis   Supportive care  Patient is full code  Discussed with nursing staff  Follow closely and further recommendation current hospital course    STEVEN GA MD

## 2021-11-09 NOTE — THERAPY EVALUATION
Acute Care - Speech Language Pathology   Swallow Treatment Note [facility]     Patient Name: Pedro Peck  : 1950  MRN: [id number]  Today's Date: 2021               Admit Date: 2021    Visit Dx:     ICD-10-CM ICD-9-CM   1. DERECK (acute kidney injury) ([facility])  N17.9 584.9   2. Hypotension, unspecified hypotension type  I95.9 458.9   3. Sepsis, due to unspecified organism, unspecified whether acute organ dysfunction present ([facility])  A41.9 038.9     995.91   4. Hypernatremia  E87.0 276.0   5. Lactic acidosis  E87.2 276.2   6. Elevated troponin  R77.8 790.6     Patient Active Problem List   Diagnosis   • DERECK (acute kidney injury) ([facility])     Past Medical History:   Diagnosis Date   • Autism      History reviewed. No pertinent surgical history.    SLP Recommendation and Plan  SLP Swallowing Diagnosis: suspected pharyngeal dysphagia (21)  SLP Diet Recommendation: soft textures, chopped, thin liquids (21)  Recommended Precautions and Strategies: upright posture during/after eating, small bites of food and sips of liquid, no straw (21)  SLP Rec. for Method of Medication Administration: meds whole, with thin liquids, with pudding or applesauce, as tolerated (21)     Monitor for Signs of Aspiration: yes (21)     Swallow Criteria for Skilled Therapeutic Interventions Met: demonstrates skilled criteria (21)  Anticipated Discharge Disposition (SLP): unknown (21)  Rehab Potential/Prognosis, Swallowing: good, to achieve stated therapy goals (21)  Therapy Frequency (Swallow): PRN (21)  Predicted Duration Therapy Intervention (Days): until discharge (21)                         Plan of Care Reviewed With: patient  Outcome Summary: Clinical swallow eval completed. Recommend mech soft chopped w/ thin; NO STRAWS; meds w/ thin or pureed; upright for meals and 30 min after; slow rate; small bites/sips. ST to  follow.    Patient was not wearing a face mask during this therapy encounter. Therapist used appropriate personal protective equipment including mask, eye protection and gloves.  Mask used was standard procedure mask. Appropriate PPE was worn during the entire therapy session. Hand hygiene was completed before and after therapy session. Patient is not in enhanced droplet precautions.     SWALLOW EVALUATION (last 72 hours)     SLP Adult Swallow Evaluation     Row Name 11/09/21 1600                   Rehab Evaluation    Document Type evaluation  -AW        Subjective Information no complaints  -AW        Patient Observations alert; cooperative; agree to therapy  -AW        Care Plan Review evaluation/treatment results reviewed; care plan/treatment goals reviewed; risks/benefits reviewed; patient/other agree to care plan; current/potential barriers reviewed  -AW        Patient Effort good  -AW        Symptoms Noted During/After Treatment none  -AW                  General Information    Patient Profile Reviewed yes  -AW        Pertinent History Of Current Problem Pt admitted with UTI w/ sepsis, weakness, dehydration, and failure to thrive. Pt has not been eating or drinking for several days. PMH: Autism  -AW        Current Method of Nutrition regular textures; thin liquids  -AW        Precautions/Limitations, Vision WFL; for purposes of eval  -AW        Precautions/Limitations, Hearing WFL; for purposes of eval  -AW        Prior Level of Function-Communication WFL  -AW        Prior Level of Function-Swallowing no diet consistency restrictions  -AW        Plans/Goals Discussed with patient; agreed upon  -AW        Barriers to Rehab none identified  -AW        Patient's Goals for Discharge return home  -AW                  Pain    Additional Documentation Pain Scale: Numbers Pre/Post-Treatment (Group)  -AW                  Pain Scale: Numbers Pre/Post-Treatment    Pretreatment Pain Rating 0/10 - no pain  -AW         Posttreatment Pain Rating 0/10 - no pain  -AW                  Oral Motor Structure and Function    Dentition Assessment natural, present and adequate; missing teeth  -AW        Secretion Management WNL/WFL  -AW        Mucosal Quality moist, healthy  -AW        Volitional Swallow WFL  -AW                  Oral Musculature and Cranial Nerve Assessment    Oral Motor General Assessment WFL  -AW                  General Eating/Swallowing Observations    Respiratory Support Currently in Use room air  -AW        Eating/Swallowing Skills fed by SLP; self-fed  -AW        Positioning During Eating upright in bed  -AW        Utensils Used spoon; cup; straw  -AW        Consistencies Trialed regular textures; soft textures; mixed consistency; pureed; thin liquids  -AW        Pre SpO2 (%) 98  -AW        Post SpO2 (%) 98  -AW                  Clinical Swallow Eval    Pharyngeal Phase suspected pharyngeal impairment  -AW        Clinical Swallow Evaluation Summary Clinical swallow eval completed. Pt tolerated cup sips of thins with no s/s. Mastication and bolus transfer functional for pureed, soft, and mixed consistencies. Pt had slow mastication with regular solid with increased transfer time. Pt tried a liquid wash w/ a straw to clear residue and choked. Feel it was a combination of the cracker residue and the fast flow of the straw. Laryngeal elevation was adequate with palpation.  -AW                  Clinical Impression    SLP Swallowing Diagnosis suspected pharyngeal dysphagia  -AW        Functional Impact risk of aspiration/pneumonia  -AW        Rehab Potential/Prognosis, Swallowing good, to achieve stated therapy goals  -AW        Swallow Criteria for Skilled Therapeutic Interventions Met demonstrates skilled criteria  -AW                  Recommendations    Therapy Frequency (Swallow) PRN  -AW        Predicted Duration Therapy Intervention (Days) until discharge  -AW        SLP Diet Recommendation soft textures; chopped;  thin liquids  -AW        Recommended Precautions and Strategies upright posture during/after eating; small bites of food and sips of liquid; no straw  -AW        Oral Care Recommendations Oral Care BID/PRN  -AW        SLP Rec. for Method of Medication Administration meds whole; with thin liquids; with pudding or applesauce; as tolerated  -AW        Monitor for Signs of Aspiration yes  -AW        Anticipated Discharge Disposition (SLP) unknown  -AW                  Swallow Goals (SLP)    Oral Nutrition/Hydration Goal Selection (SLP) oral nutrition/hydration, SLP goal 1  -AW                  Oral Nutrition/Hydration Goal 1 (SLP)    Oral Nutrition/Hydration Goal 1, SLP Pt will safely tolerate the least restrictive diet with no s/s of aspiration.  -AW        Time Frame (Oral Nutrition/Hydration Goal 1, SLP) by discharge  -AW              User Key  (r) = Recorded By, (t) = Taken By, (c) = Cosigned By    Initials Name Effective Dates    Johanna Corbin MS CCC-SLP 06/16/21 -                 EDUCATION  The patient has been educated in the following areas:   Dysphagia (Swallowing Impairment) Oral Care/Hydration Modified Diet Instruction.        SLP GOALS     Row Name 11/09/21 1600             Oral Nutrition/Hydration Goal 1 (SLP)    Oral Nutrition/Hydration Goal 1, SLP Pt will safely tolerate the least restrictive diet with no s/s of aspiration.  -AW      Time Frame (Oral Nutrition/Hydration Goal 1, SLP) by discharge  -AW            User Key  (r) = Recorded By, (t) = Taken By, (c) = Cosigned By    Initials Name Provider Type    Johanna Corbin MS CCC-SLP Speech and Language Pathologist              SLP Outcome Measures (last 72 hours)     SLP Outcome Measures     Row Name 11/09/21 1600             SLP Outcome Measures    Outcome Measure Used? Adult NOMS  -AW              Adult FCM Scores    FCM Chosen Swallowing  -AW      Swallowing FCM Score 5  -AW            User Key  (r) = Recorded By, (t) = Taken By, (c) = Cosigned By     Initials Name Effective Dates    Johanna Corbin MS CCC-SLP 06/16/21 -                  Time Calculation:    Time Calculation- SLP     Row Name 11/09/21 1642             Time Calculation- SLP    SLP Start Time 1430  -AW      SLP Received On 11/09/21  -            User Key  (r) = Recorded By, (t) = Taken By, (c) = Cosigned By    Initials Name Provider Type    Johanna Corbin MS CCC-SLP Speech and Language Pathologist                Therapy Charges for Today     Code Description Service Date Service Provider Modifiers Qty    [id number] HC ST EVAL ORAL PHARYNG SWALLOW 3 11/9/2021 Johanna Brown MS CCC-SLP GN 1               Johanna Brown MS CCC-SLP  11/9/2021

## 2021-11-09 NOTE — PLAN OF CARE
Goal Outcome Evaluation:  Plan of Care Reviewed With: patient           Outcome Summary: Clinical swallow eval completed. Recommend mech soft chopped w/ thin; NO STRAWS; meds w/ thin or pureed; upright for meals and 30 min after; slow rate; small bites/sips. ST to follow.

## 2021-11-09 NOTE — PLAN OF CARE
Goal Outcome Evaluation:               Patient alert with some confusion when given enough time he usually answers all questions accurately. Patient denies pain. Patient turned frequently. Patient used urinal with nurses assistance- urine dark like cola. Patient turned frequently. Needs assistance with setting up meals but feeds self good. IV fluids infusing at 175 cc/hr. Patient transported to radiology for Renal US- patient was able to stand and take a few steps with 2 x assistance- patient weak and moderately impaired at this time. Sister did come and visit for a short time today. No acute distress noted, will continue to monitor.

## 2021-11-09 NOTE — CONSULTS
[facility]                                                                                             Nephrology Initial Consult Note    Patient Identification:  Name: Pedro Peck MRN: [id number]  Age: 71 y.o. : 1950  Sex: male  Date:2021    Requesting Physician: As per consult order.  Reason for Consultation: Acute kidney injury, severe dehydration  Information from:patient/ family/ chart      History of Present Illness: This is a 71 y.o. year old male with no known previous significant medical history.  Patient is a poor historian so unfortunate most of the history is per chart review which is fairly limited.  I do not have any prior creatinine to compare to.  Patient lives by himself and previously had been able to care for himself does have a history of autism and chronic cognitive deficits.  Came to the ER with 1 month history of generalized weakness failure to thrive poor overall health associated with extremely poor nutrition.  Per history he had been hardly eating or drinking anything over the last week.  Only medication that he was prescribed is paroxetine but records indicate he was not even taking this.  Denies NSAIDs, does admit to very poor oral fluid intake.  BP was low on admission but improved after fluid boluses.  Labs indicated severe renal failure with severe hyponatremia, lactic acidosis and evidence of severe dehydration.      The following medical history and medications personally reviewed by me:    Problem List:   Patient Active Problem List    Diagnosis    • DERECK (acute kidney injury) ([facility]) [N17.9]        Past Medical History:  Past Medical History:   Diagnosis Date   • Autism    Depression, failure to thrive    Past Surgical History:  History reviewed. No pertinent surgical history.   He denies any previous surgeries    Home Meds:   No medications prior to admission.    Was prescribed an antidepressant but was not taking it    Current Meds:   Current Facility-Administered Medications   Medication Dose Route Frequency Provider Last Rate Last Admin   • cefTRIAXone (ROCEPHIN) 1 g in sodium chloride 0.9 % 100 mL IVPB-VTB  1 g Intravenous Q24H Fernando Ga MD       • dextrose (D5W) 5 % infusion  125 mL/hr Intravenous Continuous Fernando Ga  mL/hr at 11/09/21 0754 125 mL/hr at 11/09/21 0754   • famotidine (PEPCID) tablet 20 mg  20 mg Oral BID AC Fernando Ga MD   20 mg at 11/09/21 0710   • sodium chloride 0.9 % infusion  150 mL/hr Intravenous Continuous Shant Parr PA   Stopped at 11/08/21 2233       Allergies:  No Known Allergies    Social History:   Social History     Socioeconomic History   • Marital status: Single   Tobacco Use   • Smoking status: Never Smoker   • Smokeless tobacco: Never Used   Substance and Sexual Activity   • Alcohol use: Not Currently   • Drug use: Never   • Sexual activity: Defer        Family History:  History reviewed. No pertinent family history.     Review of Systems: as per HPI, in addition:    General:      Complains of weakness / fatigue,                       No fevers / chills                       + Weight loss  HEENT:       no dysphagia / odynophagia  Neck:           normal range of motion, no swelling  Respiratory: no cough / congestion                      No shortness of air                       No wheezing  CV:              No chest pain                       No palpitations  Abdomen/GI: no nausea / vomiting                      No diarrhea / constipation                      No abdominal pain  :             no dysuria / urinary frequency                       No urgency, normal output  Endocrine:   no polyuria / polydipsia,                      No heat or cold intolerance  Skin:           no rashes or skin breakdown   Vascular:   No edema                     No claudication  Psych:        history of autism and depression  Neuro:  "       + Focal weakness, no seizures  Musculoskeletal: no joint pain or deformities      Physical Exam:  Vitals:   Temp (24hrs), Av.5 °F (36.4 °C), Min:96.8 °F (36 °C), Max:98.4 °F (36.9 °C)    /74 (BP Location: Right arm, Patient Position: Lying)   Pulse 85   Temp 97 °F (36.1 °C) (Oral)   Resp 16   Ht 177.8 cm (70\")   Wt 60.8 kg (134 lb 0.6 oz)   SpO2 96%   BMI 19.23 kg/m²   Intake/Output:     Intake/Output Summary (Last 24 hours) at 2021 0828  Last data filed at 2021 0754  Gross per 24 hour   Intake 2859 ml   Output --   Net 2859 ml        Wt Readings from Last 1 Encounters:   21 2115 60.8 kg (134 lb 0.6 oz)   21 1504 60.3 kg (133 lb)       Exam:    General Appearance:  Awake, alert, oriented x3, no acute distress  Malnourished appearing but in no acute distress, chronically ill-appearing   Head and Face:  Normocephalic, atraumatic, mucus membranes moist, oropharynx clear, temporal muscle wasting   Eyes:  No icterus, pupils equal round and reactive to light, extraocular movements intact    ENMT: Moist mucosa, tongue symmetric    Neck: Supple  no jugular venous distention  no thyromegaly   Pulmonary:  Respiratory effort: Normal  Auscultation of lungs: Clear bilaterally  No wheezes  No rhonchi  Good air movement, good expansion   Chest wall:  No tenderness or deformity   Cardiovascular:  Auscultation of the heart: Normal rhythm, no murmurs  No significant edema of bilateral lower extremities, thin cachectic extremities   Abdomen:  Abdomen: soft, non-tender, normal bowel sounds all four quadrants, no masses   Liver and spleen: no hepatosplenomegaly   Musculoskeletal: Digits and nails: normal  Normal range of motion  No joint swelling or gross deformities    Skin: Skin inspection: color normal, no visible rashes or lesions  Skin palpation: texture, turgor normal, no palpable lesions   Lymphatic:  no cervical lymphadenopathy    Psychiatric: Judgement and insight: " normal  Orientation to person place and time: normal  Mood and affect: normal       DATA:  Radiology and Labs:  The following labs independently reviewed by me, additional AM labs ordered  Old records independently reviewed showing unknown previous history of kidney disease but there is no mention of kidney failure or chronic kidney disease in the chart  The following radiologic studies independently viewed by me, findings chest x-ray shows no acute disease  Interval notes, chart personally reviewed by me.  I have reviewed and summarized old records as detailed above  Plan of care discussed with patient himself at bedside  New problems include severe dehydration, severe hypernatremia, severe renal failure with azotemia      Risk/ complexity of medical care/ medical decision making: High risk given the severity of his electrolyte abnormalities and renal failure  Chronic illness with severe exacerbation or progression      Labs:   Recent Results (from the past 24 hour(s))   Comprehensive Metabolic Panel    Collection Time: 11/08/21  3:23 PM    Specimen: Blood   Result Value Ref Range    Glucose 146 (H) 65 - 99 mg/dL     (H) 8 - 23 mg/dL    Creatinine 5.03 (H) 0.76 - 1.27 mg/dL    Sodium 170 (C) 136 - 145 mmol/L    Potassium 5.0 3.5 - 5.2 mmol/L    Chloride 125 (H) 98 - 107 mmol/L    CO2 19.6 (L) 22.0 - 29.0 mmol/L    Calcium 11.3 (H) 8.6 - 10.5 mg/dL    Total Protein 9.3 (H) 6.0 - 8.5 g/dL    Albumin 5.10 3.50 - 5.20 g/dL    ALT (SGPT) 112 (H) 1 - 41 U/L    AST (SGOT) 61 (H) 1 - 40 U/L    Alkaline Phosphatase 112 39 - 117 U/L    Total Bilirubin 2.9 (H) 0.0 - 1.2 mg/dL    eGFR Non African Amer 11 (L) >60 mL/min/1.73    eGFR  African Amer      Globulin 4.2 gm/dL    A/G Ratio 1.2 g/dL    BUN/Creatinine Ratio 21.9 7.0 - 25.0    Anion Gap 25.4 (H) 5.0 - 15.0 mmol/L   Lactic Acid, Plasma    Collection Time: 11/08/21  3:23 PM    Specimen: Blood   Result Value Ref Range    Lactate 5.3 (C) 0.5 - 2.0 mmol/L    Procalcitonin    Collection Time: 11/08/21  3:23 PM    Specimen: Blood   Result Value Ref Range    Procalcitonin 0.70 (H) 0.00 - 0.25 ng/mL   Troponin    Collection Time: 11/08/21  3:23 PM    Specimen: Blood   Result Value Ref Range    Troponin T 0.074 (C) 0.000 - 0.030 ng/mL   BNP    Collection Time: 11/08/21  3:23 PM    Specimen: Blood   Result Value Ref Range    proBNP 3,089.0 (H) 0.0 - 900.0 pg/mL   CBC Auto Differential    Collection Time: 11/08/21  3:23 PM    Specimen: Blood   Result Value Ref Range    WBC 11.46 (H) 3.40 - 10.80 10*3/mm3    RBC 7.28 (H) 4.14 - 5.80 10*6/mm3    Hemoglobin 22.5 (H) 13.0 - 17.7 g/dL    Hematocrit 70.8 (H) 37.5 - 51.0 %    MCV 97.3 (H) 79.0 - 97.0 fL    MCH 30.9 26.6 - 33.0 pg    MCHC 31.8 31.5 - 35.7 g/dL    RDW 16.6 (H) 12.3 - 15.4 %    RDW-SD 52.2 37.0 - 54.0 fl    MPV 12.4 (H) 6.0 - 12.0 fL    Platelets 182 140 - 450 10*3/mm3    Neutrophil % 80.5 (H) 42.7 - 76.0 %    Lymphocyte % 13.4 (L) 19.6 - 45.3 %    Monocyte % 5.6 5.0 - 12.0 %    Eosinophil % 0.0 (L) 0.3 - 6.2 %    Basophil % 0.3 0.0 - 1.5 %    Immature Grans % 0.2 0.0 - 0.5 %    Neutrophils, Absolute 9.23 (H) 1.70 - 7.00 10*3/mm3    Lymphocytes, Absolute 1.53 0.70 - 3.10 10*3/mm3    Monocytes, Absolute 0.64 0.10 - 0.90 10*3/mm3    Eosinophils, Absolute 0.00 0.00 - 0.40 10*3/mm3    Basophils, Absolute 0.04 0.00 - 0.20 10*3/mm3    Immature Grans, Absolute 0.02 0.00 - 0.05 10*3/mm3    nRBC 0.0 0.0 - 0.2 /100 WBC   Ammonia    Collection Time: 11/08/21  3:23 PM    Specimen: Blood   Result Value Ref Range    Ammonia 26 16 - 60 umol/L   Phosphorus    Collection Time: 11/08/21  3:23 PM    Specimen: Blood   Result Value Ref Range    Phosphorus 7.7 (H) 2.5 - 4.5 mg/dL   ECG 12 Lead    Collection Time: 11/08/21  3:46 PM   Result Value Ref Range    QT Interval 351 ms   COVID-19,[facility] IN-HOUSE CEPHEID/ADY NP SWAB IN TRANSPORT MEDIA 8-12 HR TAT - Swab, Nasopharynx    Collection Time: 11/08/21  4:13 PM    Specimen: Nasopharynx;  Swab   Result Value Ref Range    COVID19 Not Detected Not Detected - Ref. Range   Urinalysis With Culture If Indicated - Urine, Clean Catch    Collection Time: 11/08/21  4:24 PM    Specimen: Urine, Clean Catch   Result Value Ref Range    Color, UA Dark Yellow (A) Yellow, Straw    Appearance, UA Clear Clear    pH, UA 5.5 5.0 - 8.0    Specific Gravity, UA 1.026 1.005 - 1.030    Glucose, UA Negative Negative    Ketones, UA Negative Negative    Bilirubin, UA Moderate (2+) (A) Negative    Blood, UA Large (3+) (A) Negative    Protein, UA 30 mg/dL (1+) (A) Negative    Leuk Esterase, UA Small (1+) (A) Negative    Nitrite, UA Positive (A) Negative    Urobilinogen, UA 1.0 E.U./dL 0.2 - 1.0 E.U./dL   Urinalysis, Microscopic Only - Urine, Clean Catch    Collection Time: 11/08/21  4:24 PM    Specimen: Urine, Clean Catch   Result Value Ref Range    RBC, UA Too Numerous to Count (A) None Seen, 0-2 /HPF    WBC, UA 6-12 (A) None Seen, 0-2 /HPF    Bacteria, UA 1+ (A) None Seen /HPF    Squamous Epithelial Cells, UA None Seen None Seen, 0-2 /HPF    Hyaline Casts, UA 3-6 None Seen /LPF    Methodology Manual Light Microscopy    STAT Lactic Acid, Reflex    Collection Time: 11/08/21  6:53 PM    Specimen: Arm, Left; Blood   Result Value Ref Range    Lactate 4.3 (C) 0.5 - 2.0 mmol/L       Radiology:  Imaging Results (Last 24 Hours)     Procedure Component Value Units Date/Time    XR Chest 1 View [[id number]] Collected: 11/08/21 1614     Updated: 11/08/21 1712    Narrative:      ONE VIEW PORTABLE CHEST     HISTORY: Shortness of breath.     FINDINGS: The lungs are well-expanded and clear and the heart and hilar  structures are normal. There is no acute disease.     This report was finalized on 11/8/2021 5:09 PM by Dr. Vijay Fernandez M.D.                  ASSESSMENT:   New, severe renal failure secondary to profound dehydration  Profound hyponatremia, surprising that he is alert as he is with such a high sodium  Lactic acidosis from  hypotension  Hypotension related to severe dehydration  Elevated LFTs  Hypercalcemia secondary to dehydration  Elevated hemoglobin related to hemoconcentration/dehydration  Cachexia and malnutrition  History of autism and cognitive deficits  Hyperphosphatemia related to renal failure      PLAN:   Await a.m. labs but anticipate we will see some improvement in his electrolytes and renal function  Continue hypotonic fluid with a slow decrease in serum sodium down to normal over the next several days  Monitor and replace electrolytes as needed  No need for phosphorus binders, calcium and phosphorus should auto correct with fluids  Add oral bicarb if acidosis worsens with IV fluids  IV Rocephin for empiric coverage of UTI, await urine culture  nutrition consult to help with caloric intake needs  Check renal ultrasound, urine electrolytes and CK level    Continue to monitor electrolytes and volume closely, avoid IV contrast and nephrotoxic medications     I appreciate the consult request, please call if any questions      Brian Farrar M.D  [facility]  Office phone number: [phone]  Answering service phone number: [phone]        11/9/2021        Dictation via Dragon dictation software

## 2021-11-09 NOTE — CASE MANAGEMENT/SOCIAL WORK
Continued Stay Note  [facility]     Patient Name: Pedro Peck  MRN: [id number]  Today's Date: 11/9/2021    Admit Date: 11/8/2021     Discharge Plan     Row Name 11/09/21 0859       Plan    Plan CCP to follow for needs.   YOSSI Bauer RN    Patient/Family in Agreement with Plan yes    Plan Comments FACE SHEET VERIFIED/ IM LETTER SIGNED.  Spoke to pt at bedside. Pt gave CCP permission to call his sister.  Called and spoke with his sister (Malena Reyes  [phone]).   Pt's PCP is JOAQUIN Goldman. Pt lives alone in a single story house.  Pt has been independent with ADLs.  Pt gets his prescriptions at [facility] on [facility].  Pt does not have any issues affording medications.  Pt is not current with HH.  Pt has not in SNF.   Pt's sister states she and her daughter assists pt as needed.  CCP will follow for pt discharge needs.   YOSSI Bauer RN               Discharge Codes    No documentation.                     Janice Bauer RN

## 2021-11-09 NOTE — PLAN OF CARE
Goal Outcome Evaluation:  Plan of Care Reviewed With: patient        Progress: no change  Outcome Summary: Received from ER, HX obtained per pt, Denies -pain, B/P low,82/44, 97/65, O2 sats >94% on RA, Eyes closed at long interval, resting quietly in room, will continue to monitor

## 2021-11-10 PROBLEM — E43 SEVERE MALNUTRITION: Status: ACTIVE | Noted: 2021-11-10

## 2021-11-10 LAB
ALBUMIN SERPL-MCNC: 3.5 G/DL (ref 3.5–5.2)
ALBUMIN/GLOB SERPL: 1.4 G/DL
ALP SERPL-CCNC: 79 U/L (ref 39–117)
ALT SERPL W P-5'-P-CCNC: 95 U/L (ref 1–41)
ANION GAP SERPL CALCULATED.3IONS-SCNC: 12.1 MMOL/L (ref 5–15)
AST SERPL-CCNC: 90 U/L (ref 1–40)
BACTERIA SPEC AEROBE CULT: NO GROWTH
BASOPHILS # BLD AUTO: 0.04 10*3/MM3 (ref 0–0.2)
BASOPHILS NFR BLD AUTO: 0.5 % (ref 0–1.5)
BILIRUB SERPL-MCNC: 1.2 MG/DL (ref 0–1.2)
BUN SERPL-MCNC: 75 MG/DL (ref 8–23)
BUN/CREAT SERPL: 35.7 (ref 7–25)
CALCIUM SPEC-SCNC: 8.8 MG/DL (ref 8.6–10.5)
CHLORIDE SERPL-SCNC: 122 MMOL/L (ref 98–107)
CHOLEST SERPL-MCNC: 109 MG/DL (ref 0–200)
CO2 SERPL-SCNC: 20.9 MMOL/L (ref 22–29)
CREAT SERPL-MCNC: 2.1 MG/DL (ref 0.76–1.27)
DEPRECATED RDW RBC AUTO: 50.2 FL (ref 37–54)
EOSINOPHIL # BLD AUTO: 0.08 10*3/MM3 (ref 0–0.4)
EOSINOPHIL NFR BLD AUTO: 0.9 % (ref 0.3–6.2)
ERYTHROCYTE [DISTWIDTH] IN BLOOD BY AUTOMATED COUNT: 14.2 % (ref 12.3–15.4)
GFR SERPL CREATININE-BSD FRML MDRD: 31 ML/MIN/1.73
GLOBULIN UR ELPH-MCNC: 2.5 GM/DL
GLUCOSE SERPL-MCNC: 127 MG/DL (ref 65–99)
HBA1C MFR BLD: 5.69 % (ref 4.8–5.6)
HCT VFR BLD AUTO: 49.7 % (ref 37.5–51)
HDLC SERPL-MCNC: 37 MG/DL (ref 40–60)
HGB BLD-MCNC: 15.7 G/DL (ref 13–17.7)
IMM GRANULOCYTES # BLD AUTO: 0.05 10*3/MM3 (ref 0–0.05)
IMM GRANULOCYTES NFR BLD AUTO: 0.6 % (ref 0–0.5)
LDLC SERPL CALC-MCNC: 50 MG/DL (ref 0–100)
LDLC/HDLC SERPL: 1.3 {RATIO}
LYMPHOCYTES # BLD AUTO: 1.42 10*3/MM3 (ref 0.7–3.1)
LYMPHOCYTES NFR BLD AUTO: 16.6 % (ref 19.6–45.3)
MCH RBC QN AUTO: 30.4 PG (ref 26.6–33)
MCHC RBC AUTO-ENTMCNC: 31.6 G/DL (ref 31.5–35.7)
MCV RBC AUTO: 96.1 FL (ref 79–97)
MONOCYTES # BLD AUTO: 0.67 10*3/MM3 (ref 0.1–0.9)
MONOCYTES NFR BLD AUTO: 7.9 % (ref 5–12)
NEUTROPHILS NFR BLD AUTO: 6.27 10*3/MM3 (ref 1.7–7)
NEUTROPHILS NFR BLD AUTO: 73.5 % (ref 42.7–76)
NRBC BLD AUTO-RTO: 0 /100 WBC (ref 0–0.2)
PLATELET # BLD AUTO: 76 10*3/MM3 (ref 140–450)
PMV BLD AUTO: 11.3 FL (ref 6–12)
POTASSIUM SERPL-SCNC: 3.6 MMOL/L (ref 3.5–5.2)
PROT SERPL-MCNC: 6 G/DL (ref 6–8.5)
RBC # BLD AUTO: 5.17 10*6/MM3 (ref 4.14–5.8)
SODIUM SERPL-SCNC: 155 MMOL/L (ref 136–145)
TRIGL SERPL-MCNC: 120 MG/DL (ref 0–150)
TROPONIN T SERPL-MCNC: 0.04 NG/ML (ref 0–0.03)
TSH SERPL DL<=0.05 MIU/L-ACNC: 0.82 UIU/ML (ref 0.27–4.2)
URATE SERPL-MCNC: 12.4 MG/DL (ref 3.4–7)
VLDLC SERPL-MCNC: 22 MG/DL (ref 5–40)
WBC # BLD AUTO: 8.53 10*3/MM3 (ref 3.4–10.8)

## 2021-11-10 PROCEDURE — 83036 HEMOGLOBIN GLYCOSYLATED A1C: CPT | Performed by: HOSPITALIST

## 2021-11-10 PROCEDURE — 84484 ASSAY OF TROPONIN QUANT: CPT | Performed by: HOSPITALIST

## 2021-11-10 PROCEDURE — 80053 COMPREHEN METABOLIC PANEL: CPT | Performed by: HOSPITALIST

## 2021-11-10 PROCEDURE — 85025 COMPLETE CBC W/AUTO DIFF WBC: CPT | Performed by: HOSPITALIST

## 2021-11-10 PROCEDURE — 97530 THERAPEUTIC ACTIVITIES: CPT

## 2021-11-10 PROCEDURE — 84443 ASSAY THYROID STIM HORMONE: CPT | Performed by: HOSPITALIST

## 2021-11-10 PROCEDURE — 25010000002 CEFTRIAXONE PER 250 MG: Performed by: HOSPITALIST

## 2021-11-10 PROCEDURE — 80061 LIPID PANEL: CPT | Performed by: HOSPITALIST

## 2021-11-10 PROCEDURE — 97162 PT EVAL MOD COMPLEX 30 MIN: CPT

## 2021-11-10 PROCEDURE — 97166 OT EVAL MOD COMPLEX 45 MIN: CPT

## 2021-11-10 PROCEDURE — 84550 ASSAY OF BLOOD/URIC ACID: CPT | Performed by: INTERNAL MEDICINE

## 2021-11-10 RX ORDER — POTASSIUM CHLORIDE 750 MG/1
40 TABLET, FILM COATED, EXTENDED RELEASE ORAL ONCE
Status: COMPLETED | OUTPATIENT
Start: 2021-11-10 | End: 2021-11-10

## 2021-11-10 RX ORDER — TAMSULOSIN HYDROCHLORIDE 0.4 MG/1
0.4 CAPSULE ORAL DAILY
Status: DISCONTINUED | OUTPATIENT
Start: 2021-11-10 | End: 2021-11-16

## 2021-11-10 RX ORDER — ALLOPURINOL 100 MG/1
100 TABLET ORAL DAILY
Status: DISCONTINUED | OUTPATIENT
Start: 2021-11-10 | End: 2021-11-14

## 2021-11-10 RX ORDER — MEGESTROL ACETATE 40 MG/ML
400 SUSPENSION ORAL DAILY
Status: DISCONTINUED | OUTPATIENT
Start: 2021-11-10 | End: 2021-11-15

## 2021-11-10 RX ORDER — MIRTAZAPINE 15 MG/1
15 TABLET, FILM COATED ORAL NIGHTLY
Status: DISCONTINUED | OUTPATIENT
Start: 2021-11-10 | End: 2021-11-17 | Stop reason: HOSPADM

## 2021-11-10 RX ADMIN — FAMOTIDINE 20 MG: 20 TABLET, FILM COATED ORAL at 17:37

## 2021-11-10 RX ADMIN — MIRTAZAPINE 15 MG: 15 TABLET, FILM COATED ORAL at 20:55

## 2021-11-10 RX ADMIN — FAMOTIDINE 20 MG: 20 TABLET, FILM COATED ORAL at 06:44

## 2021-11-10 RX ADMIN — TAMSULOSIN HYDROCHLORIDE 0.4 MG: 0.4 CAPSULE ORAL at 18:49

## 2021-11-10 RX ADMIN — CEFTRIAXONE 1 G: 1 INJECTION, POWDER, FOR SOLUTION INTRAMUSCULAR; INTRAVENOUS at 17:37

## 2021-11-10 RX ADMIN — DEXTROSE MONOHYDRATE 175 ML/HR: 50 INJECTION, SOLUTION INTRAVENOUS at 05:01

## 2021-11-10 RX ADMIN — ASPIRIN 81 MG: 81 TABLET, CHEWABLE ORAL at 17:37

## 2021-11-10 RX ADMIN — DEXTROSE MONOHYDRATE 125 ML/HR: 50 INJECTION, SOLUTION INTRAVENOUS at 20:55

## 2021-11-10 RX ADMIN — DEXTROSE MONOHYDRATE 125 ML/HR: 50 INJECTION, SOLUTION INTRAVENOUS at 12:10

## 2021-11-10 RX ADMIN — ALLOPURINOL 100 MG: 100 TABLET ORAL at 17:37

## 2021-11-10 RX ADMIN — POTASSIUM CHLORIDE 40 MEQ: 750 TABLET, EXTENDED RELEASE ORAL at 12:09

## 2021-11-10 RX ADMIN — MEGESTROL ACETATE 400 MG: 40 SUSPENSION ORAL at 18:46

## 2021-11-10 NOTE — PROGRESS NOTES
LOS: 2 days     Chief Complaint/ Reason for encounter: Acute kidney injury, severe hypernatremia  Chief Complaint   Patient presents with   • Weakness - Generalized         Subjective   Feels better today, eating and drinking little better  Denies any shortness of breath or chest pain  No fevers or chills  Improved appetite with no nausea or vomiting  No edema, improved urine output. Urine very dark, cola colored per RN      Medical history reviewed:  History of Present Illness    Subjective    History taken from: Patient and chart    Vital Signs  Temp:  [98 °F (36.7 °C)-98.7 °F (37.1 °C)] 98 °F (36.7 °C)  Heart Rate:  [77-90] 90  Resp:  [16-18] 18  BP: ()/(59-71) 95/66       Wt Readings from Last 1 Encounters:   11/10/21 0307 65.1 kg (143 lb 8.3 oz)   11/08/21 2115 60.8 kg (134 lb 0.6 oz)   11/08/21 1504 60.3 kg (133 lb)       Objective    Objective:  General Appearance: Somewhat malnourished, comfortable, chronically ill-appearing, in no acute distress and not in pain.  Awake, alert, oriented  HEENT: Mucous membranes moist, no injury, oropharynx clear  Lungs:  Normal effort and normal respiratory rate.  Breath sounds clear to auscultation.  No  respiratory distress.  No rales, decreased breath sounds or rhonchi.    Heart: Normal rate.  Regular rhythm.  S1 normal.  No murmur.   Abdomen: Abdomen is soft.  Bowel sounds are normal, no abdominal tenderness.  There is no rebound or guarding  Extremities: Normal range of motion. No significant edema of bilateral lower extremities, distal pulses intact  Neurological: No focal motor or sensory deficits, pupils reactive  Skin:  Warm and dry.  No rash or cyanosis.       Results Review:    Intake/Output:     Intake/Output Summary (Last 24 hours) at 11/10/2021 1047  Last data filed at 11/10/2021 0822  Gross per 24 hour   Intake 340 ml   Output 775 ml   Net -435 ml         DATA:  Radiology and Labs:  The following labs independently reviewed by me. Additional labs  ordered for tomorrow a.m.  Interval notes, chart personally reviewed by me.   Old records independently reviewed showing no prior known history of chronic kidney disease  The following radiologic studies independently viewed by me, findings renal ultrasound shows marked right hydronephrosis, moderate left hydronephrosis and distended urinary bladder, moderately enlarged prostate  New problems include hydronephrosis  Discussed with patient and his nurse at bedside    Risk/ complexity of medical care/ medical decision making moderate risk    Labs:   Recent Results (from the past 24 hour(s))   Renal Function Panel    Collection Time: 11/09/21 11:01 AM    Specimen: Blood   Result Value Ref Range    Glucose 192 (H) 65 - 99 mg/dL     (H) 8 - 23 mg/dL    Creatinine 3.30 (H) 0.76 - 1.27 mg/dL    Sodium 160 (H) 136 - 145 mmol/L    Potassium 3.9 3.5 - 5.2 mmol/L    Chloride 126 (H) 98 - 107 mmol/L    CO2 20.3 (L) 22.0 - 29.0 mmol/L    Calcium 8.9 8.6 - 10.5 mg/dL    Albumin 3.50 3.50 - 5.20 g/dL    Phosphorus 3.6 2.5 - 4.5 mg/dL    Anion Gap 13.7 5.0 - 15.0 mmol/L    BUN/Creatinine Ratio 32.4 (H) 7.0 - 25.0    eGFR Non African Amer 19 (L) >60 mL/min/1.73   CK    Collection Time: 11/09/21 11:01 AM    Specimen: Blood   Result Value Ref Range    Creatine Kinase 480 (H) 20 - 200 U/L   Sodium, Urine, Random - Urine, Clean Catch    Collection Time: 11/09/21  4:52 PM    Specimen: Urine, Clean Catch   Result Value Ref Range    Sodium, Urine 64 mmol/L   Chloride, Urine, Random - Urine, Clean Catch    Collection Time: 11/09/21  4:52 PM    Specimen: Urine, Clean Catch   Result Value Ref Range    Chloride, Urine 50 mmol/L   Creatinine, Urine, Random - Urine, Clean Catch    Collection Time: 11/09/21  4:52 PM    Specimen: Urine, Clean Catch   Result Value Ref Range    Creatinine, Urine 157.0 mg/dL   Protein, Urine, Random - Urine, Clean Catch    Collection Time: 11/09/21  4:52 PM    Specimen: Urine, Clean Catch   Result Value Ref  Range    Total Protein, Urine 28.0 mg/dL   CK    Collection Time: 11/09/21  7:01 PM    Specimen: Blood   Result Value Ref Range    Creatine Kinase 512 (H) 20 - 200 U/L   Uric Acid    Collection Time: 11/10/21  4:57 AM    Specimen: Blood   Result Value Ref Range    Uric Acid 12.4 (H) 3.4 - 7.0 mg/dL   Comprehensive Metabolic Panel    Collection Time: 11/10/21  4:57 AM    Specimen: Blood   Result Value Ref Range    Glucose 127 (H) 65 - 99 mg/dL    BUN 75 (H) 8 - 23 mg/dL    Creatinine 2.10 (H) 0.76 - 1.27 mg/dL    Sodium 155 (H) 136 - 145 mmol/L    Potassium 3.6 3.5 - 5.2 mmol/L    Chloride 122 (H) 98 - 107 mmol/L    CO2 20.9 (L) 22.0 - 29.0 mmol/L    Calcium 8.8 8.6 - 10.5 mg/dL    Total Protein 6.0 6.0 - 8.5 g/dL    Albumin 3.50 3.50 - 5.20 g/dL    ALT (SGPT) 95 (H) 1 - 41 U/L    AST (SGOT) 90 (H) 1 - 40 U/L    Alkaline Phosphatase 79 39 - 117 U/L    Total Bilirubin 1.2 0.0 - 1.2 mg/dL    eGFR Non African Amer 31 (L) >60 mL/min/1.73    Globulin 2.5 gm/dL    A/G Ratio 1.4 g/dL    BUN/Creatinine Ratio 35.7 (H) 7.0 - 25.0    Anion Gap 12.1 5.0 - 15.0 mmol/L   TSH    Collection Time: 11/10/21  4:57 AM    Specimen: Blood   Result Value Ref Range    TSH 0.823 0.270 - 4.200 uIU/mL   Lipid Panel    Collection Time: 11/10/21  4:57 AM    Specimen: Blood   Result Value Ref Range    Total Cholesterol 109 0 - 200 mg/dL    Triglycerides 120 0 - 150 mg/dL    HDL Cholesterol 37 (L) 40 - 60 mg/dL    LDL Cholesterol  50 0 - 100 mg/dL    VLDL Cholesterol 22 5 - 40 mg/dL    LDL/HDL Ratio 1.30    Hemoglobin A1c    Collection Time: 11/10/21  4:57 AM    Specimen: Blood   Result Value Ref Range    Hemoglobin A1C 5.69 (H) 4.80 - 5.60 %   Troponin    Collection Time: 11/10/21  4:57 AM    Specimen: Blood   Result Value Ref Range    Troponin T 0.036 (C) 0.000 - 0.030 ng/mL   CBC Auto Differential    Collection Time: 11/10/21  4:57 AM    Specimen: Blood   Result Value Ref Range    WBC 8.53 3.40 - 10.80 10*3/mm3    RBC 5.17 4.14 - 5.80 10*6/mm3     Hemoglobin 15.7 13.0 - 17.7 g/dL    Hematocrit 49.7 37.5 - 51.0 %    MCV 96.1 79.0 - 97.0 fL    MCH 30.4 26.6 - 33.0 pg    MCHC 31.6 31.5 - 35.7 g/dL    RDW 14.2 12.3 - 15.4 %    RDW-SD 50.2 37.0 - 54.0 fl    MPV 11.3 6.0 - 12.0 fL    Platelets 76 (L) 140 - 450 10*3/mm3    Neutrophil % 73.5 42.7 - 76.0 %    Lymphocyte % 16.6 (L) 19.6 - 45.3 %    Monocyte % 7.9 5.0 - 12.0 %    Eosinophil % 0.9 0.3 - 6.2 %    Basophil % 0.5 0.0 - 1.5 %    Immature Grans % 0.6 (H) 0.0 - 0.5 %    Neutrophils, Absolute 6.27 1.70 - 7.00 10*3/mm3    Lymphocytes, Absolute 1.42 0.70 - 3.10 10*3/mm3    Monocytes, Absolute 0.67 0.10 - 0.90 10*3/mm3    Eosinophils, Absolute 0.08 0.00 - 0.40 10*3/mm3    Basophils, Absolute 0.04 0.00 - 0.20 10*3/mm3    Immature Grans, Absolute 0.05 0.00 - 0.05 10*3/mm3    nRBC 0.0 0.0 - 0.2 /100 WBC       Radiology:  Imaging Results (Last 24 Hours)     Procedure Component Value Units Date/Time    US Renal Bilateral [[id number]] Collected: 11/09/21 2029     Updated: 11/09/21 2035    Narrative:      BILATERAL RENAL ULTRASOUND     CLINICAL HISTORY: Acute renal failure     Transverse and longitudinal images of both kidneys were obtained. I     mages of the right kidney demonstrates severe hydronephrosis with marked  diffuse cortical thinning. Images of the left kidney demonstrate mild to  moderate hydronephrosis. A small calculus is suspected in the lower pole  of the left kidney. The right kidney measures 10.0 x 4.7 x 4.9 cm. The  left kidney measures 12.5 x 5.5 x 4.8 cm. Images of the urinary bladder  demonstrate a markedly distended bladder containing large amount of  amorphus echogenic debris. Doppler ultrasound demonstrated no evidence  of urine entering the bladder from the right ureteral orifice. The  prostate gland is moderately enlarged. It measures 6.5 x 7.0 x 5.1 cm.     IMPRESSIONS: Marked right hydronephrosis and moderate left  hydronephrosis. Distended urinary bladder containing a large amount  of  echogenic debris. Moderately enlarged prostate gland. Further evaluation  with a CT scan of the abdomen and pelvis recommended.     This report was finalized on 11/9/2021 8:32 PM by Dr. Tru Gold M.D.                Medications have been reviewed:  Current Facility-Administered Medications   Medication Dose Route Frequency Provider Last Rate Last Admin   • aspirin chewable tablet 81 mg  81 mg Oral Daily Feranndo Ga MD   81 mg at 11/09/21 1833   • cefTRIAXone (ROCEPHIN) 1 g in sodium chloride 0.9 % 100 mL IVPB-VTB  1 g Intravenous Q24H Fernando Ga  mL/hr at 11/09/21 1833 1 g at 11/09/21 1833   • dextrose (D5W) 5 % infusion  175 mL/hr Intravenous Continuous Fernando Ga  mL/hr at 11/10/21 0501 175 mL/hr at 11/10/21 0501   • famotidine (PEPCID) tablet 20 mg  20 mg Oral BID AC Fernando Ga MD   20 mg at 11/10/21 0644       ASSESSMENT:  severe renal failure secondary to profound dehydration, improved with fluids  Profound hypernatremia, still high but much improved  Lactic acidosis from hypotension  Hypotension related to severe dehydration, mildly better  Elevated LFTs  Hypercalcemia secondary to dehydration  Elevated hemoglobin related to hemoconcentration/dehydration  Cachexia and malnutrition  History of autism and cognitive deficits  Hyperphosphatemia related to renal failure  Mild CK elevation, possibly some element of mild rhabdo      PLAN:  Renal function, volume and electrolytes continue to improve  He did have bilateral hydronephrosis and bladder distention on ultrasound  Will anchor White catheter and consult urology  Continue IV fluids, decrease infusion rate    Continue hypotonic fluid with a slow decrease in serum sodium down to normal over the next several days  Monitor and replace electrolytes as needed  Add oral bicarb if acidosis worsens with IV fluids  IV Rocephin for empiric coverage of UTI, await urine culture  nutrition consult to help with caloric intake  needs    Continue to monitor electrolytes and volume closely, avoid IV contrast and nephrotoxic medications     Brian Farrar MD   [facility]   Office phone number: [phone]  Answering service phone number: [phone]    11/10/21  10:47 EST    Dictation performed using Dragon dictation software

## 2021-11-10 NOTE — THERAPY EVALUATION
Patient Name: Pedro Peck  : 1950    MRN: [id number]                              Today's Date: 11/10/2021       Admit Date: 2021    Visit Dx:     ICD-10-CM ICD-9-CM   1. DERECK (acute kidney injury) ([facility])  N17.9 584.9   2. Hypotension, unspecified hypotension type  I95.9 458.9   3. Sepsis, due to unspecified organism, unspecified whether acute organ dysfunction present ([facility])  A41.9 038.9     995.91   4. Hypernatremia  E87.0 276.0   5. Lactic acidosis  E87.2 276.2   6. Elevated troponin  R77.8 790.6     Patient Active Problem List   Diagnosis   • DERECK (acute kidney injury) ([facility])     Past Medical History:   Diagnosis Date   • Autism      History reviewed. No pertinent surgical history.   General Information     Row Name 11/10/21 1242          OT Time and Intention    Document Type evaluation  -     Mode of Treatment occupational therapy; co-treatment; physical therapy  -     Row Name 11/10/21 1242          General Information    Patient Profile Reviewed yes  -BL     Prior Level of Function independent:; ADL's; feeding; grooming; bathing; dressing  -     Existing Precautions/Restrictions fall  -BL     Barriers to Rehab medically complex; cognitive status; previous functional deficit  -     Row Name 11/10/21 1242          Living Environment    Lives With alone  -     Row Name 11/10/21 1242          Cognition    Orientation Status (Cognition) oriented to; person; place; time  -     Row Name 11/10/21 1242          Safety Issues, Functional Mobility    Safety Issues Affecting Function (Mobility) insight into deficits/self-awareness; awareness of need for assistance; judgment; problem-solving; sequencing abilities; safety precautions follow-through/compliance  -     Impairments Affecting Function (Mobility) balance; cognition; coordination; endurance/activity tolerance; strength; postural/trunk control; motor control; motor planning  -BL     Cognitive Impairments, Mobility Safety/Performance  awareness, need for assistance; insight into deficits/self-awareness; judgment; problem-solving/reasoning; sequencing abilities; safety precaution follow-through  -           User Key  (r) = Recorded By, (t) = Taken By, (c) = Cosigned By    Initials Name Provider Type    Carli Lucio OT Occupational Therapist                 Mobility/ADL's     Row Name 11/10/21 1244          Bed Mobility    Bed Mobility supine-sit; sit-supine  -BL     Supine-Sit [place] (Bed Mobility) minimum assist (75% patient effort); verbal cues  -BL     Sit-Supine [place] (Bed Mobility) minimum assist (75% patient effort); verbal cues  -BL     Assistive Device (Bed Mobility) bed rails; head of bed elevated  -     Row Name 11/10/21 1244          Transfers    Transfers sit-stand transfer  -     Comment (Transfers) Pt performed sit>stand transition with use of rolling walker and Min A x2.  -     Sit-Stand [place] (Transfers) minimum assist (75% patient effort); 2 person assist; verbal cues; nonverbal cues (demo/gesture)  -     Row Name 11/10/21 1244          Sit-Stand Transfer    Assistive Device (Sit-Stand Transfers) walker, front-wheeled  -     Row Name 11/10/21 1244          Activities of Daily Living    BADL Assessment/Intervention lower body dressing; grooming  -     Row Name 11/10/21 1244          Lower Body Dressing Assessment/Training    [place] Level (Lower Body Dressing) doff; don; socks; maximum assist (25% patient effort)  -     Position (Lower Body Dressing) edge of bed sitting  -     Row Name 11/10/21 1244          Grooming Assessment/Training    [place] Level (Grooming) wash face, hands; set up; verbal cues  -     Position (Grooming) edge of bed sitting  -           User Key  (r) = Recorded By, (t) = Taken By, (c) = Cosigned By    Initials Name Provider Type    Carli Lucio OT Occupational Therapist               Obj/Interventions     Row Name 11/10/21 1243           Sensory Assessment (Somatosensory)    Sensory Assessment (Somatosensory) UE sensation intact  -BL     Row Name 11/10/21 1245          Vision Assessment/Intervention    Visual Impairment/Limitations WFL  -     Row Name 11/10/21 1245          Range of Motion Comprehensive    General Range of Motion bilateral upper extremity ROM WFL  -     Row Name 11/10/21 1245          Strength Comprehensive (MMT)    Comment, General Manual Muscle Testing (MMT) Assessment BUE 3-/5  -BL     Row Name 11/10/21 1245          Motor Skills    Motor Skills coordination  -     Coordination moderate impairment; dysdiadochokinesia; bilateral; upper extremity; dysmetria; finger to nose  -BL     Row Name 11/10/21 1245          Balance    Balance Assessment sitting static balance; sitting dynamic balance; sit to stand dynamic balance; standing static balance; standing dynamic balance  -     Static Sitting Balance moderate impairment; unsupported; sitting, edge of bed  -BL     Dynamic Sitting Balance moderate impairment; unsupported; sitting, edge of bed  -BL     Sit to Stand Dynamic Balance mild impairment; supported; standing  -BL     Static Standing Balance severe impairment; supported; standing  -BL     Dynamic Standing Balance severe impairment; supported; standing  -BL     Balance Interventions sitting; standing; sit to stand; supported; static; dynamic; occupation based/functional task  -     Comment, Balance Pt noted with posterior lean in standing and sitting position with decrease awareness for postural adjustment.  -BL           User Key  (r) = Recorded By, (t) = Taken By, (c) = Cosigned By    Initials Name Provider Type    BL Carli Lemus OT Occupational Therapist               Goals/Plan     Row Name 11/10/21 1252          Bed Mobility Goal 1 (OT)    Activity/Assistive Device (Bed Mobility Goal 1, OT) bed mobility activities, all  -BL     [place] Level/Cues Needed (Bed Mobility Goal 1, OT) standby assist  -BL      Time Frame (Bed Mobility Goal 1, OT) short term goal (STG); 2 weeks  -BL     Progress/Outcomes (Bed Mobility Goal 1, OT) continuing progress toward goal  -BL     Row Name 11/10/21 1252          Transfer Goal 1 (OT)    Activity/Assistive Device (Transfer Goal 1, OT) transfers, all  -BL     [place] Level/Cues Needed (Transfer Goal 1, OT) contact guard assist  -BL     Time Frame (Transfer Goal 1, OT) short term goal (STG); 2 weeks  -BL     Progress/Outcome (Transfer Goal 1, OT) continuing progress toward goal  -BL     Row Name 11/10/21 1252          Dressing Goal 1 (OT)    Activity/Device (Dressing Goal 1, OT) dressing skills, all  -BL     [place]/Cues Needed (Dressing Goal 1, OT) minimum assist (75% or more patient effort)  -BL     Time Frame (Dressing Goal 1, OT) short term goal (STG); 2 weeks  -BL     Progress/Outcome (Dressing Goal 1, OT) continuing progress toward goal  -BL     Row Name 11/10/21 1252          Grooming Goal 1 (OT)    Activity/Device (Grooming Goal 1, OT) grooming skills, all  -BL     [place] (Grooming Goal 1, OT) set-up required  -BL     Time Frame (Grooming Goal 1, OT) short term goal (STG); 2 weeks  -BL     Progress/Outcome (Grooming Goal 1, OT) continuing progress toward goal  -BL     Row Name 11/10/21 1252          Strength Goal 1 (OT)    Strength Goal 1 (OT) Pt will increase BUE strength to 3/5 prior to D/C.  -BL     Time Frame (Strength Goal 1, OT) short term goal (STG); 2 weeks  -BL     Progress/Outcome (Strength Goal 1, OT) continuing progress toward goal  -BL     Row Name 11/10/21 1252          Therapy Assessment/Plan (OT)    Planned Therapy Interventions (OT) activity tolerance training; occupation/activity based interventions; IADL retraining; BADL retraining; patient/caregiver education/training; transfer/mobility retraining; strengthening exercise; ROM/therapeutic exercise  -BL           User Key  (r) = Recorded By, (t) = Taken By, (c) = Cosigned By    Initials Name  Provider Type     Carli Lemus OT Occupational Therapist               Clinical Impression     Row Name 11/10/21 1246          Pain Assessment    Additional Documentation Pain Scale: Numbers Pre/Post-Treatment (Group)  -     Row Name 11/10/21 1246          Pain Scale: Numbers Pre/Post-Treatment    Pretreatment Pain Rating 0/10 - no pain  -BL     Posttreatment Pain Rating 0/10 - no pain  -BL     Row Name 11/10/21 1246          Plan of Care Review    Plan of Care Reviewed With patient  -     Progress no change  -     Outcome Summary Pt seen by OT this date for evaluation. Pt is a 72 y/o male admit to [facility] from home for generalized weakness, failure to thrive. Pt reports that he lives at home alone and is independent with all ADLs/IADLs at baseline. Pt reports that he does not use any AD for functional mobility and does have falls. Upon arrival pt lying supine in bed, no c/o pain, A&O. Pt performed bed mobility with Min A for sup>sit>sup transition with verbal cues. Pt Max A for LB dressing task sitting EOB. Pt noted with moderate imapired static/dynamic sitting balance with decrease body awareness for postural adjustment. Pt performed sit>stand transition with use of rolling walker and Min A to stand however pt noted with heavy posterior lean in standing and decrease balance. Pt able to side step towards HOB and front/backwards with verbal cues and decrease balance. Pt completed grooming task sitting EOB with S/U. Pt left in supine, needs in reach, alarm on. Pt demonstrates decrease balance, decrease strength, decrease cognition,decrease coordination, decrease functional mobility, decrease ADL performance, decrease endurance/activity tolerance. Pt to benefit from skilled OT services to address goals and deficits. OT rec SNF at D/C. OT wore mask, gloves, glasses, hand hygine performed.  -     Row Name 11/10/21 124          Therapy Assessment/Plan (OT)    Rehab Potential (OT) good, to achieve stated  therapy goals  -BL     Criteria for Skilled Therapeutic Interventions Met (OT) yes; skilled treatment is necessary  -BL     Therapy Frequency (OT) 5 times/wk  -BL     Row Name 11/10/21 1246          Therapy Plan Review/Discharge Plan (OT)    Anticipated Discharge Disposition (OT) skilled nursing facility  -     Row Name 11/10/21 1246          Vital Signs    Pre Patient Position Supine  -BL     Intra Patient Position Standing  -BL     Post Patient Position Supine  -BL     Row Name 11/10/21 1246          Positioning and Restraints    Pre-Treatment Position in bed  -BL     Post Treatment Position bed  -BL     In Bed supine; call light within reach; encouraged to call for assist; exit alarm on  -BL           User Key  (r) = Recorded By, (t) = Taken By, (c) = Cosigned By    Initials Name Provider Type    Carli Lucio, CASEY Occupational Therapist               Outcome Measures     Row Name 11/10/21 1253          How much help from another is currently needed...    Putting on and taking off regular lower body clothing? 2  -BL     Bathing (including washing, rinsing, and drying) 2  -BL     Toileting (which includes using toilet bed pan or urinal) 2  -BL     Putting on and taking off regular upper body clothing 3  -BL     Taking care of personal grooming (such as brushing teeth) 3  -BL     Eating meals 3  -BL     AM-PAC 6 Clicks Score (OT) 15  -BL     Row Name 11/10/21 1216          How much help from another person do you currently need...    Turning from your back to your side while in flat bed without using bedrails? 3  -CF     Moving from lying on back to sitting on the side of a flat bed without bedrails? 3  -CF     Moving to and from a bed to a chair (including a wheelchair)? 2  -CF     Standing up from a chair using your arms (e.g., wheelchair, bedside chair)? 2  -CF     Climbing 3-5 steps with a railing? 1  -CF     To walk in hospital room? 1  -CF     AM-PAC 6 Clicks Score (PT) 12  -CF     Row Name 11/10/21  1253 11/10/21 1216       Functional Assessment    Outcome Measure Options AM-PAC 6 Clicks Daily Activity (OT)  -BL AM-PAC 6 Clicks Basic Mobility (PT)  -          User Key  (r) = Recorded By, (t) = Taken By, (c) = Cosigned By    Initials Name Provider Type    CF Gerri Cutler, PT Physical Therapist     Carli Lemus OT Occupational Therapist                Occupational Therapy Education                 Title: PT OT SLP Therapies (In Progress)     Topic: Occupational Therapy (In Progress)     Point: ADL training (Done)     Description:   Instruct learner(s) on proper safety adaptation and remediation techniques during self care or transfers.   Instruct in proper use of assistive devices.              Learning Progress Summary           Patient Acceptance, E, VU by  at 11/10/2021 1253    Comment: The role of OT                   Point: Home exercise program (Not Started)     Description:   Instruct learner(s) on appropriate technique for monitoring, assisting and/or progressing therapeutic exercises/activities.              Learner Progress:  Not documented in this visit.          Point: Precautions (Not Started)     Description:   Instruct learner(s) on prescribed precautions during self-care and functional transfers.              Learner Progress:  Not documented in this visit.          Point: Body mechanics (Not Started)     Description:   Instruct learner(s) on proper positioning and spine alignment during self-care, functional mobility activities and/or exercises.              Learner Progress:  Not documented in this visit.                      User Key     Initials Effective Dates Name Provider Type Discipline     01/05/21 -  Carli Lemus OT Occupational Therapist OT              OT Recommendation and Plan  Planned Therapy Interventions (OT): activity tolerance training, occupation/activity based interventions, IADL retraining, BADL retraining, patient/caregiver education/training,  transfer/mobility retraining, strengthening exercise, ROM/therapeutic exercise  Therapy Frequency (OT): 5 times/wk  Plan of Care Review  Plan of Care Reviewed With: patient  Progress: no change  Outcome Summary: Pt seen by OT this date for evaluation. Pt is a 72 y/o male admit to [facility] from home for generalized weakness, failure to thrive. Pt reports that he lives at home alone and is independent with all ADLs/IADLs at baseline. Pt reports that he does not use any AD for functional mobility and does have falls. Upon arrival pt lying supine in bed, no c/o pain, A&O. Pt performed bed mobility with Min A for sup>sit>sup transition with verbal cues. Pt Max A for LB dressing task sitting EOB. Pt noted with moderate imapired static/dynamic sitting balance with decrease body awareness for postural adjustment. Pt performed sit>stand transition with use of rolling walker and Min A to stand however pt noted with heavy posterior lean in standing and decrease balance. Pt able to side step towards HOB and front/backwards with verbal cues and decrease balance. Pt completed grooming task sitting EOB with S/U. Pt left in supine, needs in reach, alarm on. Pt demonstrates decrease balance, decrease strength, decrease cognition,decrease coordination, decrease functional mobility, decrease ADL performance, decrease endurance/activity tolerance. Pt to benefit from skilled OT services to address goals and deficits. OT rec SNF at D/C. OT wore mask, gloves, glasses, hand hygine performed.     Time Calculation:    Time Calculation- OT     Row Name 11/10/21 1253             Time Calculation- OT    OT Start Time 0859  -BL      OT Stop Time 0919  -BL      OT Time Calculation (min) 20 min  -BL      Total Timed Code Minutes- OT 15 minute(s)  -BL      OT Received On 11/10/21  -BL      OT - Next Appointment 11/11/21  -BL      OT Goal Re-Cert Due Date 11/24/21  -BL              Timed Charges    34270 - OT Therapeutic Activity Minutes 15  -BL               Untimed Charges    OT Eval/Re-eval Minutes 5  -BL              Total Minutes    Timed Charges Total Minutes 15  -BL      Untimed Charges Total Minutes 5  -BL       Total Minutes 20  -BL            User Key  (r) = Recorded By, (t) = Taken By, (c) = Cosigned By    Initials Name Provider Type    Carli Lucio OT Occupational Therapist              Therapy Charges for Today     Code Description Service Date Service Provider Modifiers Qty    07604776131 HC OT THERAPEUTIC ACT EA 15 MIN 11/10/2021 Carli Lemus OT GO 1    14054256510 HC OT EVAL MOD COMPLEXITY 2 11/10/2021 Carli Lemus OT GO 1               Carli Lemus OT  11/10/2021

## 2021-11-10 NOTE — PLAN OF CARE
Goal Outcome Evaluation:  Plan of Care Reviewed With: patient        Progress: no change  Outcome Summary: Pt seen by OT this date for evaluation. Pt is a 70 y/o male admit to [facility] from home for generalized weakness, failure to thrive. Pt reports that he lives at home alone and is independent with all ADLs/IADLs at baseline. Pt reports that he does not use any AD for functional mobility and does have falls. Upon arrival pt lying supine in bed, no c/o pain, A&O. Pt performed bed mobility with Min A for sup>sit>sup transition with verbal cues. Pt Max A for LB dressing task sitting EOB. Pt noted with moderate imapired static/dynamic sitting balance with decrease body awareness for postural adjustment. Pt performed sit>stand transition with use of rolling walker and Min A to stand however pt noted with heavy posterior lean in standing and decrease balance. Pt able to side step towards HOB and front/backwards with verbal cues and decrease balance. Pt completed grooming task sitting EOB with S/U. Pt left in supine, needs in reach, alarm on. Pt demonstrates decrease balance, decrease strength, decrease cognition,decrease coordination, decrease functional mobility, decrease ADL performance, decrease endurance/activity tolerance. Pt to benefit from skilled OT services to address goals and deficits. OT rec SNF at D/C. OT wore mask, gloves, glasses, hand hygine performed.

## 2021-11-10 NOTE — PLAN OF CARE
Goal Outcome Evaluation:               Patient alert and oriented, denies pain. D5W infusing per orders. Labs seem to slowly be improving. White placed today for urinary obstruction- (patient tolerated well) per  Daily- patient seen by Dr Nicholas today. Patient appears a little more anxious today then yesterday- reassurance provided. Patient turned frequently- patient also wiggles around in the bed alot. Patient seems to be tolerating meals- on calorie count- boost with meals provided. No acute distress noted, will continue to monitor.

## 2021-11-10 NOTE — NURSING NOTE
Nurse called Dr Ga to see if he wanted to have a reflex lactate drawn- reflex was not done yesterday after being transferred to the unit. Dr Ga since patient is already on an antibiotic no reflex is needed at this time. No new orders at this time.

## 2021-11-10 NOTE — THERAPY EVALUATION
Patient Name: Pedro Peck  : 1950    MRN: [id number]                              Today's Date: 11/10/2021       Admit Date: 2021    Visit Dx:     ICD-10-CM ICD-9-CM   1. DERECK (acute kidney injury) ([facility])  N17.9 584.9   2. Hypotension, unspecified hypotension type  I95.9 458.9   3. Sepsis, due to unspecified organism, unspecified whether acute organ dysfunction present ([facility])  A41.9 038.9     995.91   4. Hypernatremia  E87.0 276.0   5. Lactic acidosis  E87.2 276.2   6. Elevated troponin  R77.8 790.6     Patient Active Problem List   Diagnosis   • DERECK (acute kidney injury) ([facility])     Past Medical History:   Diagnosis Date   • Autism      History reviewed. No pertinent surgical history.   General Information     Row Name 11/10/21 1203          Physical Therapy Time and Intention    Document Type evaluation  -CF     Mode of Treatment individual therapy; physical therapy  -     Row Name 11/10/21 1203          General Information    Patient Profile Reviewed yes  -CF     Prior Level of Function independent:  reports independence with mobility and ADLs without AD, recent fall, hx of Autism  -CF     Existing Precautions/Restrictions fall   -CF     Barriers to Rehab cognitive status; medically complex  -CF     Row Name 11/10/21 1203          Living Environment    Lives With alone  -CF     Row Name 11/10/21 1203          Home Main Entrance    Number of Stairs, Main Entrance four  -CF     Stair Railings, Main Entrance railings safe and in good condition  -     Row Name 11/10/21 1203          Stairs Within Home, Primary    Number of Stairs, Within Home, Primary none  -CF     Row Name 11/10/21 1203          Cognition    Orientation Status (Cognition) oriented to; place; person; time  -CF     Row Name 11/10/21 1203          Safety Issues, Functional Mobility    Safety Issues Affecting Function (Mobility) insight into deficits/self-awareness; awareness of need for assistance; sequencing abilities; problem-solving  -CF      Impairments Affecting Function (Mobility) balance; endurance/activity tolerance; cognition; coordination; strength; postural/trunk control; motor control; motor planning  -CF           User Key  (r) = Recorded By, (t) = Taken By, (c) = Cosigned By    Initials Name Provider Type    CF Gerri Cutler PT Physical Therapist               Mobility     Row Name 11/10/21 1207          Bed Mobility    Bed Mobility supine-sit; sit-supine  -CF     Supine-Sit [place] (Bed Mobility) minimum assist (75% patient effort); verbal cues  -CF     Sit-Supine [place] (Bed Mobility) minimum assist (75% patient effort); verbal cues  -CF     Assistive Device (Bed Mobility) head of bed elevated; bed rails  -CF     Row Name 11/10/21 1207          Sit-Stand Transfer    Sit-Stand [place] (Transfers) minimum assist (75% patient effort); 2 person assist; verbal cues  -CF     Assistive Device (Sit-Stand Transfers) walker, front-wheeled  -CF     Row Name 11/10/21 120          Gait/Stairs (Locomotion)    [place] Level (Gait) moderate assist (50% patient effort); 2 person assist; verbal cues; nonverbal cues (demo/gesture)  -CF     Assistive Device (Gait) walker, front-wheeled  -CF     Distance in Feet (Gait) 3-4 side steps in each direction at EOB  -CF     Deviations/Abnormal Patterns (Gait) kevyn decreased; gait speed decreased; stride length decreased; base of support, narrow  -CF     Bilateral Gait Deviations forward flexed posture  -CF     Comment (Gait/Stairs) mod A x2 for standing balance, posterior lean and requires cueing for sequencing steps. Cues to WS anteriorly - pt with poor understanding. Very unsafe when standing due to poor balance  -CF           User Key  (r) = Recorded By, (t) = Taken By, (c) = Cosigned By    Initials Name Provider Type    Gerri Sam PT Physical Therapist               Obj/Interventions     Row Name 11/10/21 1202          Range of Motion Comprehensive    General Range of  Motion bilateral lower extremity ROM WFL  -CF     Row Name 11/10/21 1209          Strength Comprehensive (MMT)    Comment, General Manual Muscle Testing (MMT) Assessment BLE grossly 3/5  -CF     Row Name 11/10/21 1209          Motor Skills    Therapeutic Exercise other (see comments)  B ankle pumps, laq x5 reps; poor coordination with tasks  -CF     Row Name 11/10/21 1209          Balance    Balance Assessment sitting static balance; sitting dynamic balance; standing static balance; standing dynamic balance  -CF     Static Sitting Balance mild impairment; sitting, edge of bed; unsupported  -CF     Dynamic Sitting Balance moderate impairment; sitting, edge of bed; unsupported  mod to max A at times, better when pt crossed legs and used UE on knees for support  -CF     Static Standing Balance supported; severe impairment  -CF     Dynamic Standing Balance severe impairment; supported  -CF     Balance Interventions sitting; dynamic reaching; moderate challenge  -CF     Comment, Balance psoterior lean with sitting and standing, mod A x2 at times. Dynamic reaching activity to target, coordination deficits noted  -CF           User Key  (r) = Recorded By, (t) = Taken By, (c) = Cosigned By    Initials Name Provider Type    CF Gerri Cutler, PT Physical Therapist               Goals/Plan     Row Name 11/10/21 1216          Bed Mobility Goal 1 (PT)    Activity/Assistive Device (Bed Mobility Goal 1, PT) bed mobility activities, all  -CF     [place] Level/Cues Needed (Bed Mobility Goal 1, PT) standby assist  -CF     Time Frame (Bed Mobility Goal 1, PT) 2 weeks  -CF     Row Name 11/10/21 1216          Transfer Goal 1 (PT)    Activity/Assistive Device (Transfer Goal 1, PT) sit-to-stand/stand-to-sit; bed-to-chair/chair-to-bed  LRAD  -CF     [place] Level/Cues Needed (Transfer Goal 1, PT) minimum assist (75% or more patient effort); 1 person assist  -CF     Time Frame (Transfer Goal 1, PT) 2 weeks  -CF     Row Name  11/10/21 1216          Gait Training Goal 1 (PT)    Activity/Assistive Device (Gait Training Goal 1, PT) gait (walking locomotion)  LRAD  -CF     [place] Level (Gait Training Goal 1, PT) moderate assist (50-74% patient effort); 1 person assist  -CF     Distance (Gait Training Goal 1, PT) 50'  -CF     Time Frame (Gait Training Goal 1, PT) 2 weeks  -CF           User Key  (r) = Recorded By, (t) = Taken By, (c) = Cosigned By    Initials Name Provider Type    CF Gerri Cutler, PT Physical Therapist               Clinical Impression     Row Name 11/10/21 1211          Pain Scale: Numbers Pre/Post-Treatment    Pretreatment Pain Rating 0/10 - no pain  -CF     Posttreatment Pain Rating 0/10 - no pain  -CF     Row Name 11/10/21 1211          Plan of Care Review    Plan of Care Reviewed With patient  -CF     Outcome Summary Pt is a 70 yo male admitted from home with generalized weakness and adult failure to thrive as pt had not been eating or drinking for approximately 1 week. Pt reports living alone and typically is IND with all mobility and ADLs. He does report a recent fall and his sister assists as needed. Pt with history of Autism. Upon exam, pt required min A for bed mobility. At EOB, pt with posterior lean and at times required mod/max A for upright sitting. Pt completed dynamic reaching activity with cues and increased time. Pt completed sit<>stand with min A x2 but required mod A x2 for standing balance at EOB. Pt very unsteady and demonstrates posterior lean and coordination difficulties. Attempted to use walker for support assist but pt required cues for appropriate use of device. Pt may benefit from skilled PT services to address severe balance impairments, generalized weakness and decreased activity tolerance. Pt not safe to return home alone based on current mobility level. PT recommending d/c to SNF at this time and likely will require increased assist long term.  -CF     Row Name 11/10/21 1211           Therapy Assessment/Plan (PT)    Rehab Potential (PT) good, to achieve stated therapy goals  -CF     Criteria for Skilled Interventions Met (PT) yes  -CF     Predicted Duration of Therapy Intervention (PT) 2 weeks  -CF     Row Name 11/10/21 1211          Vital Signs    O2 Delivery Pre Treatment room air  -CF     O2 Delivery Intra Treatment room air  -CF     O2 Delivery Post Treatment room air  -CF     Row Name 11/10/21 1211          Positioning and Restraints    Pre-Treatment Position in bed  -CF     Post Treatment Position bed  -CF     In Bed notified nsg; call light within reach; encouraged to call for assist; exit alarm on; fowlers; SCD pump applied  -CF           User Key  (r) = Recorded By, (t) = Taken By, (c) = Cosigned By    Initials Name Provider Type    CF Gerri Cutler, JAMES Physical Therapist               Outcome Measures     Row Name 11/10/21 1216          How much help from another person do you currently need...    Turning from your back to your side while in flat bed without using bedrails? 3  -CF     Moving from lying on back to sitting on the side of a flat bed without bedrails? 3  -CF     Moving to and from a bed to a chair (including a wheelchair)? 2  -CF     Standing up from a chair using your arms (e.g., wheelchair, bedside chair)? 2  -CF     Climbing 3-5 steps with a railing? 1  -CF     To walk in hospital room? 1  -CF     AM-PAC 6 Clicks Score (PT) 12  -CF     Row Name 11/10/21 1216          Functional Assessment    Outcome Measure Options AM-PAC 6 Clicks Basic Mobility (PT)  -CF           User Key  (r) = Recorded By, (t) = Taken By, (c) = Cosigned By    Initials Name Provider Type    Gerri Sam, JAMES Physical Therapist                             Physical Therapy Education                 Title: PT OT SLP Therapies (Done)     Topic: Physical Therapy (Done)     Point: Mobility training (Done)     Learning Progress Summary           Patient Acceptance, E, VU,NR by  at 11/10/2021  1217                   Point: Home exercise program (Done)     Learning Progress Summary           Patient Acceptance, E, VU,NR by  at 11/10/2021 1217                   Point: Body mechanics (Done)     Learning Progress Summary           Patient Acceptance, E, VU,NR by  at 11/10/2021 1217                   Point: Precautions (Done)     Learning Progress Summary           Patient Acceptance, E, VU,NR by  at 11/10/2021 1217                               User Key     Initials Effective Dates Name Provider Type Discipline     06/16/21 -  Gerri Cutler, JAMES Physical Therapist PT              PT Recommendation and Plan  Planned Therapy Interventions (PT): balance training, bed mobility training, gait training, home exercise program, ROM (range of motion), strengthening, patient/family education, transfer training, postural re-education, motor coordination training, neuromuscular re-education  Plan of Care Reviewed With: patient  Outcome Summary: Pt is a 70 yo male admitted from home with generalized weakness and adult failure to thrive as pt had not been eating or drinking for approximately 1 week. Pt reports living alone and typically is IND with all mobility and ADLs. He does report a recent fall and his sister assists as needed. Pt with history of Autism. Upon exam, pt required min A for bed mobility. At EOB, pt with posterior lean and at times required mod/max A for upright sitting. Pt completed dynamic reaching activity with cues and increased time. Pt completed sit<>stand with min A x2 but required mod A x2 for standing balance at EOB. Pt very unsteady and demonstrates posterior lean and coordination difficulties. Attempted to use walker for support assist but pt required cues for appropriate use of device. Pt may benefit from skilled PT services to address severe balance impairments, generalized weakness and decreased activity tolerance. Pt not safe to return home alone based on current mobility level.  PT recommending d/c to SNF at this time and likely will require increased assist long term.     Time Calculation:    PT Charges     Row Name 11/10/21 1218             Time Calculation    Start Time 0858  -CF      Stop Time 0917  -CF      Time Calculation (min) 19 min  -CF      PT Received On 11/10/21  -CF      PT - Next Appointment 11/11/21  -CF      PT Goal Re-Cert Due Date 11/24/21  -CF              Time Calculation- PT    Total Timed Code Minutes- PT 12 minute(s)  -CF            User Key  (r) = Recorded By, (t) = Taken By, (c) = Cosigned By    Initials Name Provider Type    CF Gerri Cutelr, PT Physical Therapist              Therapy Charges for Today     Code Description Service Date Service Provider Modifiers Qty    29780623390 HC PT EVAL MOD COMPLEXITY 2 11/10/2021 Gerri Cutler, PT GP 1    59921213054 HC PT THERAPEUTIC ACT EA 15 MIN 11/10/2021 Gerri Cutler, PT GP 1          PT G-Codes  Outcome Measure Options: AM-PAC 6 Clicks Basic Mobility (PT)  AM-PAC 6 Clicks Score (PT): 12    Gerri Cutler PT  11/10/2021

## 2021-11-10 NOTE — PLAN OF CARE
Goal Outcome Evaluation:  Plan of Care Reviewed With: patient        Progress: no change  Outcome Summary: Patient   resting  in bed.  Denies  any   pain.   IV   fluids  infusing  as  ordered. Voiding  per  urinal  with  encouragement.  Urine  very  dark.   Speech   lag  noted.  SR  on  the  monitor.   Nursing  will  continue  to  monitor.

## 2021-11-10 NOTE — PROGRESS NOTES
"Daily progress note    Chief complaint  Awake and alert  Still eating poorly  No new complaints  Denies any chest pain shortness of breath palpitation    History of present illness  71-year-old white male with no medical problems and no home medication brought to the emergency room by the family with generalized weakness for 1 month failure to thrive not eating drinking for several days to week.  Patient has no fever cough chest pain shortness of breath abdominal pain nausea vomiting diarrhea.  Patient looks cachectic and work-up in ER revealed acute kidney injury with severe dehydration and hypernatremia also found to have UTI admit for management.  Patient remained fully awake alert and oriented x3.     REVIEW OF SYSTEMS  Unobtainable secondary to altered mental status     PHYSICAL EXAM   Blood pressure 109/61, pulse 74, temperature 98.4 °F (36.9 °C), temperature source Oral, resp. rate 18, height 177.8 cm (70\"), weight 65.1 kg (143 lb 8.3 oz), SpO2 96 %.    GENERAL: Awake and alert, acutely ill-appearing cachectic, not distressed  HEENT:  Unremarkable  CV: regular rhythm, tachycardic rate, no murmur  RESPIRATORY: normal effort, CTA  ABDOMEN: soft, nontender bowel sounds positive  MUSCULOSKELETAL: no deformity, FROM, no calf swelling or tenderness  NEURO: alert, slurred speech, moves all extremities, follows commands  SKIN: warm, dry     LAB RESULTS  Lab Results (last 24 hours)     Procedure Component Value Units Date/Time    Urine Culture - Urine, Urine, Clean Catch [[id number]]  (Normal) Collected: 11/08/21 1624    Specimen: Urine, Clean Catch Updated: 11/10/21 0636     Urine Culture No growth    Troponin [[id number]]  (Abnormal) Collected: 11/10/21 0457    Specimen: Blood Updated: 11/10/21 0629     Troponin T 0.036 ng/mL     Narrative:      Troponin T Reference Range:  <= 0.03 ng/mL-   Negative for AMI  >0.03 ng/mL-     Abnormal for myocardial necrosis.  Clinicians would have to utilize clinical acumen, EKG, " Troponin and serial changes to determine if it is an Acute Myocardial Infarction or myocardial injury due to an underlying chronic condition.       Results may be falsely decreased if patient taking Biotin.      TSH [[id number]]  (Normal) Collected: 11/10/21 0457    Specimen: Blood Updated: 11/10/21 0627     TSH 0.823 uIU/mL     Comprehensive Metabolic Panel [[id number]]  (Abnormal) Collected: 11/10/21 0457    Specimen: Blood Updated: 11/10/21 0626     Glucose 127 mg/dL      BUN 75 mg/dL      Creatinine 2.10 mg/dL      Sodium 155 mmol/L      Potassium 3.6 mmol/L      Chloride 122 mmol/L      CO2 20.9 mmol/L      Calcium 8.8 mg/dL      Total Protein 6.0 g/dL      Albumin 3.50 g/dL      ALT (SGPT) 95 U/L      AST (SGOT) 90 U/L      Alkaline Phosphatase 79 U/L      Total Bilirubin 1.2 mg/dL      eGFR Non African Amer 31 mL/min/1.73      Globulin 2.5 gm/dL      A/G Ratio 1.4 g/dL      BUN/Creatinine Ratio 35.7     Anion Gap 12.1 mmol/L     Narrative:      GFR Normal >60  Chronic Kidney Disease <60  Kidney Failure <15      Uric Acid [[id number]]  (Abnormal) Collected: 11/10/21 0457    Specimen: Blood Updated: 11/10/21 0625     Uric Acid 12.4 mg/dL     Lipid Panel [[id number]]  (Abnormal) Collected: 11/10/21 0457    Specimen: Blood Updated: 11/10/21 0625     Total Cholesterol 109 mg/dL      Triglycerides 120 mg/dL      HDL Cholesterol 37 mg/dL      LDL Cholesterol  50 mg/dL      VLDL Cholesterol 22 mg/dL      LDL/HDL Ratio 1.30    Narrative:      Cholesterol Reference Ranges  (U.S. Department of Health and Human Services ATP III Classifications)    Desirable          <200 mg/dL  Borderline High    200-239 mg/dL  High Risk          >240 mg/dL      Triglyceride Reference Ranges  (U.S. Department of Health and Human Services ATP III Classifications)    Normal           <150 mg/dL  Borderline High  150-199 mg/dL  High             200-499 mg/dL  Very High        >500 mg/dL    HDL Reference Ranges  (U.S. Department of Health and  Human Services ATP III Classifcations)    Low     <40 mg/dl (major risk factor for CHD)  High    >60 mg/dl ('negative' risk factor for CHD)        LDL Reference Ranges  (U.S. Department of Health and Human Services ATP III Classifcations)    Optimal          <100 mg/dL  Near Optimal     100-129 mg/dL  Borderline High  130-159 mg/dL  High             160-189 mg/dL  Very High        >189 mg/dL    CBC & Differential [[id number]]  (Abnormal) Collected: 11/10/21 0457    Specimen: Blood Updated: 11/10/21 0551    Narrative:      The following orders were created for panel order CBC & Differential.  Procedure                               Abnormality         Status                     ---------                               -----------         ------                     CBC Auto Differential[[id number]]        Abnormal            Final result                 Please view results for these tests on the individual orders.    CBC Auto Differential [[id number]]  (Abnormal) Collected: 11/10/21 0457    Specimen: Blood Updated: 11/10/21 0551     WBC 8.53 10*3/mm3      RBC 5.17 10*6/mm3      Hemoglobin 15.7 g/dL      Hematocrit 49.7 %      MCV 96.1 fL      MCH 30.4 pg      MCHC 31.6 g/dL      RDW 14.2 %      RDW-SD 50.2 fl      MPV 11.3 fL      Platelets 76 10*3/mm3      Neutrophil % 73.5 %      Lymphocyte % 16.6 %      Monocyte % 7.9 %      Eosinophil % 0.9 %      Basophil % 0.5 %      Immature Grans % 0.6 %      Neutrophils, Absolute 6.27 10*3/mm3      Lymphocytes, Absolute 1.42 10*3/mm3      Monocytes, Absolute 0.67 10*3/mm3      Eosinophils, Absolute 0.08 10*3/mm3      Basophils, Absolute 0.04 10*3/mm3      Immature Grans, Absolute 0.05 10*3/mm3      nRBC 0.0 /100 WBC     Hemoglobin A1c [[id number]]  (Abnormal) Collected: 11/10/21 0457    Specimen: Blood Updated: 11/10/21 0530     Hemoglobin A1C 5.69 %     Narrative:      Hemoglobin A1C Ranges:    Increased Risk for Diabetes  5.7% to 6.4%  Diabetes                     >=  6.5%  Diabetic Goal                < 7.0%    CK [[id number]]  (Abnormal) Collected: 11/09/21 1901    Specimen: Blood Updated: 11/09/21 1942     Creatine Kinase 512 U/L     Chloride, Urine, Random - Urine, Clean Catch [[id number]] Collected: 11/09/21 1652    Specimen: Urine, Clean Catch Updated: 11/09/21 1823     Chloride, Urine 50 mmol/L     Narrative:      Reference intervals for random urine have not been established.  Clinical usage is dependent upon physician's interpretation in combination with other laboratory tests.       Sodium, Urine, Random - Urine, Clean Catch [[id number]] Collected: 11/09/21 1652    Specimen: Urine, Clean Catch Updated: 11/09/21 1822     Sodium, Urine 64 mmol/L     Narrative:      Reference intervals for random urine have not been established.  Clinical usage is dependent upon physician's interpretation in combination with other laboratory tests.       Protein, Urine, Random - Urine, Clean Catch [[id number]] Collected: 11/09/21 1652    Specimen: Urine, Clean Catch Updated: 11/09/21 1822     Total Protein, Urine 28.0 mg/dL     Narrative:      Reference intervals for random urine have not been established.  Clinical usage is dependent upon physician's interpretation in combination with other laboratory tests.       Creatinine, Urine, Random - Urine, Clean Catch [[id number]] Collected: 11/09/21 1652    Specimen: Urine, Clean Catch Updated: 11/09/21 1822     Creatinine, Urine 157.0 mg/dL     Narrative:      Reference intervals for random urine have not been established.  Clinical usage is dependent upon physician's interpretation in combination with other laboratory tests.       Blood Culture - Blood, Arm, Left [[id number]]  (Normal) Collected: 11/08/21 1611    Specimen: Blood from Arm, Left Updated: 11/09/21 1646     Blood Culture No growth at 24 hours    Blood Culture - Blood, Arm, Right [[id number]]  (Normal) Collected: 11/08/21 1523    Specimen: Blood from Arm, Right Updated: 11/09/21 1546      Blood Culture No growth at 24 hours        Imaging Results (Last 24 Hours)     Procedure Component Value Units Date/Time    US Renal Bilateral [[id number]] Collected: 11/09/21 2029     Updated: 11/09/21 2035    Narrative:      BILATERAL RENAL ULTRASOUND     CLINICAL HISTORY: Acute renal failure     Transverse and longitudinal images of both kidneys were obtained. I     mages of the right kidney demonstrates severe hydronephrosis with marked  diffuse cortical thinning. Images of the left kidney demonstrate mild to  moderate hydronephrosis. A small calculus is suspected in the lower pole  of the left kidney. The right kidney measures 10.0 x 4.7 x 4.9 cm. The  left kidney measures 12.5 x 5.5 x 4.8 cm. Images of the urinary bladder  demonstrate a markedly distended bladder containing large amount of  amorphus echogenic debris. Doppler ultrasound demonstrated no evidence  of urine entering the bladder from the right ureteral orifice. The  prostate gland is moderately enlarged. It measures 6.5 x 7.0 x 5.1 cm.     IMPRESSIONS: Marked right hydronephrosis and moderate left  hydronephrosis. Distended urinary bladder containing a large amount of  echogenic debris. Moderately enlarged prostate gland. Further evaluation  with a CT scan of the abdomen and pelvis recommended.     This report was finalized on 11/9/2021 8:32 PM by Dr. Tru Gold M.D.                    ECG 12 Lead  Component   Ref Range & Units 11/8/21 1546   QT Interval   ms 351              HEART RATE= 107  bpm  RR Interval= 560  ms  PA Interval= 129  ms  P Horizontal Axis= -10  deg  P Front Axis= 78  deg  QRSD Interval= 71  ms  QT Interval= 351  ms  QRS Axis= -60  deg  T Wave Axis= 57  deg  - ABNORMAL ECG -  Sinus tachycardia  Biatrial enlargement  LAD, consider left anterior fascicular block  ST elevation, consider inferior injury  NO PRIOR TRACING AVAILABLE FOR COMPARISON             Current Facility-Administered Medications:   •  aspirin chewable tablet  81 mg, 81 mg, Oral, Daily, Steven Ga MD, 81 mg at 11/09/21 1833  •  cefTRIAXone (ROCEPHIN) 1 g in sodium chloride 0.9 % 100 mL IVPB-VTB, 1 g, Intravenous, Q24H, Steven Ga MD, Last Rate: 200 mL/hr at 11/09/21 1833, 1 g at 11/09/21 1833  •  dextrose (D5W) 5 % infusion, 125 mL/hr, Intravenous, Continuous, Brian Farrar MD, Last Rate: 125 mL/hr at 11/10/21 1210, 125 mL/hr at 11/10/21 1210  •  famotidine (PEPCID) tablet 20 mg, 20 mg, Oral, BID AC, Steven Ga MD, 20 mg at 11/10/21 0644     ASSESSMENT  Acute kidney injury  Acute UTI with sepsis  Severe dehydration  Bilateral hydronephrosis  Hypernatremia  Hyperkalemia  Elevated troponin with no chest pain  Elevated blood sugar with no history of diabetes  Failure to thrive  Gastroesophageal reflux disease    PLAN  CPM  IVF  IV antibiotics   Urology consult  Nephrology to follow patient  Stress ulcer DVT prophylaxis   Supportive care  PT/OT  Discussed with nursing staff  Follow closely and further recommendation current hospital course    STEVEN GA MD

## 2021-11-10 NOTE — CONSULTS
[facility] CONS[facility]      Patient Identification:  NAME:  Pedro Peck  Age:  71 y.o.   Sex:  male   :  1950   MRN:  [id number]       Chief complaint: Difficulty urinating    History of present illness: 71-year-old gentleman with very little prior history but what I can ascertain is a high functioning autistic gentleman who comes in with renal failure and failure to thrive.  Ultrasound shows bilateral hydronephrosis and a distended bladder.  Catheter was placed without incident and very little difficulty.  He denies any baseline voiding issues but upon further questioning he states he gets up once or twice a night.  He denies incontinence at night and he denies incontinence during the day.  He has minimal frequency and occasional urgency he says his stream is a good caliber and he does not strain to void.  He denies any intermittency to void.  He said he has never had gross hematuria or UTIs in the past.      Past medical history:  Past Medical History:   Diagnosis Date   • Autism        Past surgical history:  History reviewed. No pertinent surgical history.    Allergies:  Patient has no known allergies.    Home medications:  No medications prior to admission.        Hospital medications:  allopurinol, 100 mg, Oral, Daily  aspirin, 81 mg, Oral, Daily  cefTRIAXone, 1 g, Intravenous, Q24H  famotidine, 20 mg, Oral, BID AC  megestrol, 400 mg, Oral, Daily  mirtazapine, 15 mg, Oral, Nightly      dextrose, 125 mL/hr, Last Rate: 125 mL/hr (11/10/21 1210)          Family history:  History reviewed. No pertinent family history.    Social history:  Social History     Tobacco Use   • Smoking status: Never Smoker   • Smokeless tobacco: Never Used   Substance Use Topics   • Alcohol use: Not Currently   • Drug use: Never       Review of systems:    Negative 12-system ROS except for the following: As above.  He denies any bowel issues      Objective:  TMax 24 hours:   Temp (24hrs), Av.3 °F (36.8 °C), Min:98 °F  (36.7 °C), Max:98.7 °F (37.1 °C)      Vitals Ranges:   Temp:  [98 °F (36.7 °C)-98.7 °F (37.1 °C)] 98.4 °F (36.9 °C)  Heart Rate:  [74-90] 74  Resp:  [18] 18  BP: ()/(59-66) 109/61    Intake/Output Last 3 shifts:  I/O last 3 completed shifts:  In: 1170 [P.O.:120; I.V.:950; IV Piggyback:100]  Out: 775 [Urine:775]     Physical Exam:       General Appearance:    Alert, cooperative, in no acute distress   Head:    Normocephalic, without obvious abnormality, atraumatic   Eyes:          PERRL, conjunctivae and corneas clear   Ears:    Normal external inspection   Throat:   No oral lesions, oral mucosa moist   Neck:   Supple, no LAD, trachea midline   Back:     No CVA tenderness   Lungs:     Respirations unlabored, symmetric excursion    Heart:    RRR, intact peripheral pulses   Abdomen:    Soft and benign   :   Penis normal scrotum normal testes normal   CHRIS:  Extremities:  Rectal tone is normal.  No fecal impaction.  Prostate is enlarged but smooth and symmetrical.  No edema, no deformity   Skin:   No bleeding, bruising or rashes   Neuro/Psych:   Orientation intact, mood/affect pleasant, no focal findings       Results review:   I reviewed the patient's new clinical results.    Data review:  Lab Results (last 24 hours)     Procedure Component Value Units Date/Time    Blood Culture - Blood, Arm, Left [[id number]]  (Normal) Collected: 11/08/21 1611    Specimen: Blood from Arm, Left Updated: 11/10/21 1645     Blood Culture No growth at 2 days    Blood Culture - Blood, Arm, Right [[id number]]  (Normal) Collected: 11/08/21 1523    Specimen: Blood from Arm, Right Updated: 11/10/21 1545     Blood Culture No growth at 2 days    Urine Culture - Urine, Urine, Clean Catch [[id number]]  (Normal) Collected: 11/08/21 1624    Specimen: Urine, Clean Catch Updated: 11/10/21 0636     Urine Culture No growth    Troponin [[id number]]  (Abnormal) Collected: 11/10/21 0457    Specimen: Blood Updated: 11/10/21 0629     Troponin T 0.036 ng/mL      Narrative:      Troponin T Reference Range:  <= 0.03 ng/mL-   Negative for AMI  >0.03 ng/mL-     Abnormal for myocardial necrosis.  Clinicians would have to utilize clinical acumen, EKG, Troponin and serial changes to determine if it is an Acute Myocardial Infarction or myocardial injury due to an underlying chronic condition.       Results may be falsely decreased if patient taking Biotin.      TSH [[id number]]  (Normal) Collected: 11/10/21 0457    Specimen: Blood Updated: 11/10/21 0627     TSH 0.823 uIU/mL     Comprehensive Metabolic Panel [[id number]]  (Abnormal) Collected: 11/10/21 0457    Specimen: Blood Updated: 11/10/21 0626     Glucose 127 mg/dL      BUN 75 mg/dL      Creatinine 2.10 mg/dL      Sodium 155 mmol/L      Potassium 3.6 mmol/L      Chloride 122 mmol/L      CO2 20.9 mmol/L      Calcium 8.8 mg/dL      Total Protein 6.0 g/dL      Albumin 3.50 g/dL      ALT (SGPT) 95 U/L      AST (SGOT) 90 U/L      Alkaline Phosphatase 79 U/L      Total Bilirubin 1.2 mg/dL      eGFR Non African Amer 31 mL/min/1.73      Globulin 2.5 gm/dL      A/G Ratio 1.4 g/dL      BUN/Creatinine Ratio 35.7     Anion Gap 12.1 mmol/L     Narrative:      GFR Normal >60  Chronic Kidney Disease <60  Kidney Failure <15      Uric Acid [[id number]]  (Abnormal) Collected: 11/10/21 0457    Specimen: Blood Updated: 11/10/21 0625     Uric Acid 12.4 mg/dL     Lipid Panel [[id number]]  (Abnormal) Collected: 11/10/21 0457    Specimen: Blood Updated: 11/10/21 0625     Total Cholesterol 109 mg/dL      Triglycerides 120 mg/dL      HDL Cholesterol 37 mg/dL      LDL Cholesterol  50 mg/dL      VLDL Cholesterol 22 mg/dL      LDL/HDL Ratio 1.30    Narrative:      Cholesterol Reference Ranges  (U.S. Department of Health and Human Services ATP III Classifications)    Desirable          <200 mg/dL  Borderline High    200-239 mg/dL  High Risk          >240 mg/dL      Triglyceride Reference Ranges  (U.S. Department of Health and Human Services ATP III  Classifications)    Normal           <150 mg/dL  Borderline High  150-199 mg/dL  High             200-499 mg/dL  Very High        >500 mg/dL    HDL Reference Ranges  (U.S. Department of Health and Human Services ATP III Classifcations)    Low     <40 mg/dl (major risk factor for CHD)  High    >60 mg/dl ('negative' risk factor for CHD)        LDL Reference Ranges  (U.S. Department of Health and Human Services ATP III Classifcations)    Optimal          <100 mg/dL  Near Optimal     100-129 mg/dL  Borderline High  130-159 mg/dL  High             160-189 mg/dL  Very High        >189 mg/dL    CBC & Differential [[id number]]  (Abnormal) Collected: 11/10/21 0457    Specimen: Blood Updated: 11/10/21 0551    Narrative:      The following orders were created for panel order CBC & Differential.  Procedure                               Abnormality         Status                     ---------                               -----------         ------                     CBC Auto Differential[[id number]]        Abnormal            Final result                 Please view results for these tests on the individual orders.    CBC Auto Differential [[id number]]  (Abnormal) Collected: 11/10/21 0457    Specimen: Blood Updated: 11/10/21 0551     WBC 8.53 10*3/mm3      RBC 5.17 10*6/mm3      Hemoglobin 15.7 g/dL      Hematocrit 49.7 %      MCV 96.1 fL      MCH 30.4 pg      MCHC 31.6 g/dL      RDW 14.2 %      RDW-SD 50.2 fl      MPV 11.3 fL      Platelets 76 10*3/mm3      Neutrophil % 73.5 %      Lymphocyte % 16.6 %      Monocyte % 7.9 %      Eosinophil % 0.9 %      Basophil % 0.5 %      Immature Grans % 0.6 %      Neutrophils, Absolute 6.27 10*3/mm3      Lymphocytes, Absolute 1.42 10*3/mm3      Monocytes, Absolute 0.67 10*3/mm3      Eosinophils, Absolute 0.08 10*3/mm3      Basophils, Absolute 0.04 10*3/mm3      Immature Grans, Absolute 0.05 10*3/mm3      nRBC 0.0 /100 WBC     Hemoglobin A1c [[id number]]  (Abnormal) Collected: 11/10/21 0457     Specimen: Blood Updated: 11/10/21 0530     Hemoglobin A1C 5.69 %     Narrative:      Hemoglobin A1C Ranges:    Increased Risk for Diabetes  5.7% to 6.4%  Diabetes                     >= 6.5%  Diabetic Goal                < 7.0%    CK [[id number]]  (Abnormal) Collected: 11/09/21 1901    Specimen: Blood Updated: 11/09/21 1942     Creatine Kinase 512 U/L     Chloride, Urine, Random - Urine, Clean Catch [[id number]] Collected: 11/09/21 1652    Specimen: Urine, Clean Catch Updated: 11/09/21 1823     Chloride, Urine 50 mmol/L     Narrative:      Reference intervals for random urine have not been established.  Clinical usage is dependent upon physician's interpretation in combination with other laboratory tests.       Sodium, Urine, Random - Urine, Clean Catch [[id number]] Collected: 11/09/21 1652    Specimen: Urine, Clean Catch Updated: 11/09/21 1822     Sodium, Urine 64 mmol/L     Narrative:      Reference intervals for random urine have not been established.  Clinical usage is dependent upon physician's interpretation in combination with other laboratory tests.       Protein, Urine, Random - Urine, Clean Catch [[id number]] Collected: 11/09/21 1652    Specimen: Urine, Clean Catch Updated: 11/09/21 1822     Total Protein, Urine 28.0 mg/dL     Narrative:      Reference intervals for random urine have not been established.  Clinical usage is dependent upon physician's interpretation in combination with other laboratory tests.       Creatinine, Urine, Random - Urine, Clean Catch [[id number]] Collected: 11/09/21 1652    Specimen: Urine, Clean Catch Updated: 11/09/21 1822     Creatinine, Urine 157.0 mg/dL     Narrative:      Reference intervals for random urine have not been established.  Clinical usage is dependent upon physician's interpretation in combination with other laboratory tests.              Imaging:  Imaging Results (Last 24 Hours)     Procedure Component Value Units Date/Time    US Renal Bilateral [[id number]]  Collected: 11/09/21 2029     Updated: 11/09/21 2035    Narrative:      BILATERAL RENAL ULTRASOUND     CLINICAL HISTORY: Acute renal failure     Transverse and longitudinal images of both kidneys were obtained. I     mages of the right kidney demonstrates severe hydronephrosis with marked  diffuse cortical thinning. Images of the left kidney demonstrate mild to  moderate hydronephrosis. A small calculus is suspected in the lower pole  of the left kidney. The right kidney measures 10.0 x 4.7 x 4.9 cm. The  left kidney measures 12.5 x 5.5 x 4.8 cm. Images of the urinary bladder  demonstrate a markedly distended bladder containing large amount of  amorphus echogenic debris. Doppler ultrasound demonstrated no evidence  of urine entering the bladder from the right ureteral orifice. The  prostate gland is moderately enlarged. It measures 6.5 x 7.0 x 5.1 cm.     IMPRESSIONS: Marked right hydronephrosis and moderate left  hydronephrosis. Distended urinary bladder containing a large amount of  echogenic debris. Moderately enlarged prostate gland. Further evaluation  with a CT scan of the abdomen and pelvis recommended.     This report was finalized on 11/9/2021 8:32 PM by Dr. Tru Gold M.D.                Assessment:       DERECK (acute kidney injury) (HCC)    Urinary retention with bilateral hydronephrosis contributing to his acute renal insufficiency    Plan:     We will start him on alpha-blocker therapy and given a voiding trial in a few days.    Rambo Nicholas MD  11/10/21  16:54 EST

## 2021-11-10 NOTE — PROGRESS NOTES
Nutrition Services    Patient Name:  Pedro Peck  YOB: 1950  MRN: [id number]  Admit Date:  11/8/2021    Calorie count follow up.  No calorie count information saved.  Spoke with CNA, calorie count will start at lunch today.    RD to follow up tomorrow for day 1 results.    Electronically signed by:  Daria Riddle RD  11/10/21 12:15 EST

## 2021-11-10 NOTE — PLAN OF CARE
Goal Outcome Evaluation:  Plan of Care Reviewed With: patient           Outcome Summary: Pt is a 72 yo male admitted from home with generalized weakness and adult failure to thrive as pt had not been eating or drinking for approximately 1 week. Pt reports living alone and typically is IND with all mobility and ADLs. He does report a recent fall and his sister assists as needed. Pt with history of Autism. Upon exam, pt required min A for bed mobility. At EOB, pt with posterior lean and at times required mod/max A for upright sitting. Pt completed dynamic reaching activity with cues and increased time. Pt completed sit<>stand with min A x2 but required mod A x2 for standing balance at EOB. Pt very unsteady and demonstrates posterior lean and coordination difficulties. Attempted to use walker for support assist but pt required cues for appropriate use of device. Pt may benefit from skilled PT services to address severe balance impairments, generalized weakness and decreased activity tolerance. Pt not safe to return home alone based on current mobility level. PT recommending d/c to SNF at this time and likely will require increased assist long term.

## 2021-11-11 PROBLEM — E55.9 VITAMIN D DEFICIENCY: Status: ACTIVE | Noted: 2021-11-11

## 2021-11-11 LAB
25(OH)D3 SERPL-MCNC: 21.2 NG/ML (ref 30–100)
ALBUMIN SERPL-MCNC: 2.8 G/DL (ref 3.5–5.2)
ALBUMIN/GLOB SERPL: 1.2 G/DL
ALP SERPL-CCNC: 70 U/L (ref 39–117)
ALT SERPL W P-5'-P-CCNC: 81 U/L (ref 1–41)
ANION GAP SERPL CALCULATED.3IONS-SCNC: 6.7 MMOL/L (ref 5–15)
AST SERPL-CCNC: 79 U/L (ref 1–40)
BASOPHILS # BLD AUTO: 0.03 10*3/MM3 (ref 0–0.2)
BASOPHILS NFR BLD AUTO: 0.5 % (ref 0–1.5)
BILIRUB SERPL-MCNC: 0.8 MG/DL (ref 0–1.2)
BUN SERPL-MCNC: 49 MG/DL (ref 8–23)
BUN/CREAT SERPL: 30.4 (ref 7–25)
CALCIUM SPEC-SCNC: 8.3 MG/DL (ref 8.6–10.5)
CHLORIDE SERPL-SCNC: 118 MMOL/L (ref 98–107)
CO2 SERPL-SCNC: 19.3 MMOL/L (ref 22–29)
CREAT SERPL-MCNC: 1.61 MG/DL (ref 0.76–1.27)
DEPRECATED RDW RBC AUTO: 48.3 FL (ref 37–54)
EOSINOPHIL # BLD AUTO: 0.19 10*3/MM3 (ref 0–0.4)
EOSINOPHIL NFR BLD AUTO: 2.9 % (ref 0.3–6.2)
ERYTHROCYTE [DISTWIDTH] IN BLOOD BY AUTOMATED COUNT: 14.1 % (ref 12.3–15.4)
GFR SERPL CREATININE-BSD FRML MDRD: 43 ML/MIN/1.73
GLOBULIN UR ELPH-MCNC: 2.3 GM/DL
GLUCOSE SERPL-MCNC: 89 MG/DL (ref 65–99)
HCT VFR BLD AUTO: 40.5 % (ref 37.5–51)
HGB BLD-MCNC: 13.9 G/DL (ref 13–17.7)
IMM GRANULOCYTES # BLD AUTO: 0.07 10*3/MM3 (ref 0–0.05)
IMM GRANULOCYTES NFR BLD AUTO: 1.1 % (ref 0–0.5)
LYMPHOCYTES # BLD AUTO: 1.51 10*3/MM3 (ref 0.7–3.1)
LYMPHOCYTES NFR BLD AUTO: 23.2 % (ref 19.6–45.3)
MCH RBC QN AUTO: 31.8 PG (ref 26.6–33)
MCHC RBC AUTO-ENTMCNC: 34.3 G/DL (ref 31.5–35.7)
MCV RBC AUTO: 92.7 FL (ref 79–97)
MONOCYTES # BLD AUTO: 0.73 10*3/MM3 (ref 0.1–0.9)
MONOCYTES NFR BLD AUTO: 11.2 % (ref 5–12)
NEUTROPHILS NFR BLD AUTO: 3.98 10*3/MM3 (ref 1.7–7)
NEUTROPHILS NFR BLD AUTO: 61.1 % (ref 42.7–76)
NRBC BLD AUTO-RTO: 0 /100 WBC (ref 0–0.2)
PLATELET # BLD AUTO: 59 10*3/MM3 (ref 140–450)
PMV BLD AUTO: 12.2 FL (ref 6–12)
POTASSIUM SERPL-SCNC: 3.7 MMOL/L (ref 3.5–5.2)
PROT SERPL-MCNC: 5.1 G/DL (ref 6–8.5)
RBC # BLD AUTO: 4.37 10*6/MM3 (ref 4.14–5.8)
SODIUM SERPL-SCNC: 144 MMOL/L (ref 136–145)
WBC # BLD AUTO: 6.51 10*3/MM3 (ref 3.4–10.8)

## 2021-11-11 PROCEDURE — 97110 THERAPEUTIC EXERCISES: CPT

## 2021-11-11 PROCEDURE — 85025 COMPLETE CBC W/AUTO DIFF WBC: CPT | Performed by: HOSPITALIST

## 2021-11-11 PROCEDURE — 25010000002 CEFTRIAXONE PER 250 MG: Performed by: HOSPITALIST

## 2021-11-11 PROCEDURE — 82306 VITAMIN D 25 HYDROXY: CPT | Performed by: INTERNAL MEDICINE

## 2021-11-11 PROCEDURE — 80053 COMPREHEN METABOLIC PANEL: CPT | Performed by: HOSPITALIST

## 2021-11-11 RX ORDER — ERGOCALCIFEROL 1.25 MG/1
50000 CAPSULE ORAL
Status: DISCONTINUED | OUTPATIENT
Start: 2021-11-11 | End: 2021-11-17 | Stop reason: HOSPADM

## 2021-11-11 RX ADMIN — CEFTRIAXONE 1 G: 1 INJECTION, POWDER, FOR SOLUTION INTRAMUSCULAR; INTRAVENOUS at 17:44

## 2021-11-11 RX ADMIN — FAMOTIDINE 20 MG: 20 TABLET, FILM COATED ORAL at 17:44

## 2021-11-11 RX ADMIN — FAMOTIDINE 20 MG: 20 TABLET, FILM COATED ORAL at 06:38

## 2021-11-11 RX ADMIN — ERGOCALCIFEROL 50000 UNITS: 1.25 CAPSULE ORAL at 17:44

## 2021-11-11 RX ADMIN — ASPIRIN 81 MG: 81 TABLET, CHEWABLE ORAL at 17:44

## 2021-11-11 RX ADMIN — MIRTAZAPINE 15 MG: 15 TABLET, FILM COATED ORAL at 20:59

## 2021-11-11 RX ADMIN — ALLOPURINOL 100 MG: 100 TABLET ORAL at 10:09

## 2021-11-11 RX ADMIN — DEXTROSE MONOHYDRATE 125 ML/HR: 50 INJECTION, SOLUTION INTRAVENOUS at 06:38

## 2021-11-11 RX ADMIN — SODIUM BICARBONATE: 84 INJECTION, SOLUTION INTRAVENOUS at 12:22

## 2021-11-11 RX ADMIN — TAMSULOSIN HYDROCHLORIDE 0.4 MG: 0.4 CAPSULE ORAL at 10:09

## 2021-11-11 RX ADMIN — MEGESTROL ACETATE 400 MG: 40 SUSPENSION ORAL at 10:09

## 2021-11-11 NOTE — PROGRESS NOTES
"  [facility] Progress Note    Chief Complaint: None    She is doing well.  Eating his eggs.  Urine output excellent.  Tolerating his tamsulosin with no hypotension.  As he has his White catheter in but I could not readily located.    Review of Systems:    The following systems were reviewed and negative;  respiratory and cardiovascular          Vital Signs  BP 95/62 (BP Location: Right arm, Patient Position: Lying)   Pulse 92   Temp 98.6 °F (37 °C) (Oral)   Resp 16   Ht 177.8 cm (70\")   Wt 70.5 kg (155 lb 6.8 oz)   SpO2 99%   BMI 22.30 kg/m²     Physical Exam:     General Appearance:    Alert, cooperative, NAD   HEENT:    No trauma, pupils reactive, hearing intact   Back:     No CVA tenderness   Lungs:     Respirations unlabored, no wheezing    Heart:    RRR, intact peripheral pulses   Abdomen:     Soft, NDNT, no masses, no guarding   :   Penis normal testes normal   Extremities:   No edema, no deformity   Lymphatic:   No neck or groin LAD   Skin:   No bleeding, bruising or rashes   Neuro/Psych:   Orientation intact, mood/affect pleasant, no focal findings        Results Review:     I reviewed the patient's new clinical results.  Lab Results (last 24 hours)     Procedure Component Value Units Date/Time    Blood Culture - Blood, Arm, Left [[id number]]  (Normal) Collected: 11/08/21 1611    Specimen: Blood from Arm, Left Updated: 11/11/21 1645     Blood Culture No growth at 3 days    Blood Culture - Blood, Arm, Right [[id number]]  (Normal) Collected: 11/08/21 1523    Specimen: Blood from Arm, Right Updated: 11/11/21 1545     Blood Culture No growth at 3 days    Comprehensive Metabolic Panel [[id number]]  (Abnormal) Collected: 11/11/21 0541    Specimen: Blood Updated: 11/11/21 0644     Glucose 89 mg/dL      BUN 49 mg/dL      Creatinine 1.61 mg/dL      Sodium 144 mmol/L      Potassium 3.7 mmol/L      Comment: Slight hemolysis detected by analyzer. Results may be affected.        Chloride 118 mmol/L      CO2 19.3 " mmol/L      Calcium 8.3 mg/dL      Total Protein 5.1 g/dL      Albumin 2.80 g/dL      ALT (SGPT) 81 U/L      AST (SGOT) 79 U/L      Alkaline Phosphatase 70 U/L      Total Bilirubin 0.8 mg/dL      eGFR Non African Amer 43 mL/min/1.73      Globulin 2.3 gm/dL      A/G Ratio 1.2 g/dL      BUN/Creatinine Ratio 30.4     Anion Gap 6.7 mmol/L     Narrative:      GFR Normal >60  Chronic Kidney Disease <60  Kidney Failure <15      Vitamin D 25 Hydroxy [[id number]]  (Abnormal) Collected: 11/11/21 0541    Specimen: Blood Updated: 11/11/21 0643     25 Hydroxy, Vitamin D 21.2 ng/ml     Narrative:      Reference Range for Total Vitamin D 25(OH)     Deficiency <20.0 ng/mL   Insufficiency 21-29 ng/mL   Sufficiency  ng/mL  Toxicity >100 ng/ml    Results may be falsely increased if patient taking Biotin.      CBC & Differential [[id number]]  (Abnormal) Collected: 11/11/21 0541    Specimen: Blood Updated: 11/11/21 0608    Narrative:      The following orders were created for panel order CBC & Differential.  Procedure                               Abnormality         Status                     ---------                               -----------         ------                     CBC Auto Differential[[id number]]        Abnormal            Final result                 Please view results for these tests on the individual orders.    CBC Auto Differential [[id number]]  (Abnormal) Collected: 11/11/21 0541    Specimen: Blood Updated: 11/11/21 0608     WBC 6.51 10*3/mm3      RBC 4.37 10*6/mm3      Hemoglobin 13.9 g/dL      Hematocrit 40.5 %      MCV 92.7 fL      MCH 31.8 pg      MCHC 34.3 g/dL      RDW 14.1 %      RDW-SD 48.3 fl      MPV 12.2 fL      Platelets 59 10*3/mm3      Neutrophil % 61.1 %      Lymphocyte % 23.2 %      Monocyte % 11.2 %      Eosinophil % 2.9 %      Basophil % 0.5 %      Immature Grans % 1.1 %      Neutrophils, Absolute 3.98 10*3/mm3      Lymphocytes, Absolute 1.51 10*3/mm3      Monocytes, Absolute 0.73 10*3/mm3       Eosinophils, Absolute 0.19 10*3/mm3      Basophils, Absolute 0.03 10*3/mm3      Immature Grans, Absolute 0.07 10*3/mm3      nRBC 0.0 /100 WBC         Imaging Results (Last 24 Hours)     ** No results found for the last 24 hours. **          Medication Review:   I have personally reviewed    Current Facility-Administered Medications:   •  allopurinol (ZYLOPRIM) tablet 100 mg, 100 mg, Oral, Daily, Fernando Ga MD, 100 mg at 11/11/21 1009  •  aspirin chewable tablet 81 mg, 81 mg, Oral, Daily, Fernando Ga MD, 81 mg at 11/11/21 1744  •  cefTRIAXone (ROCEPHIN) 1 g in sodium chloride 0.9 % 100 mL IVPB-VTB, 1 g, Intravenous, Q24H, Fernando Ga MD, Last Rate: 200 mL/hr at 11/11/21 1744, 1 g at 11/11/21 1744  •  famotidine (PEPCID) tablet 20 mg, 20 mg, Oral, BID AC, Fernando Ga MD, 20 mg at 11/11/21 1744  •  megestrol (MEGACE) 40 MG/ML suspension 400 mg, 400 mg, Oral, Daily, Fernando Ga MD, 400 mg at 11/11/21 1009  •  mirtazapine (REMERON) tablet 15 mg, 15 mg, Oral, Nightly, Fernando Ga MD, 15 mg at 11/10/21 2055  •  sodium chloride 0.45 % 925 mL with sodium bicarbonate 8.4 % 75 mEq infusion, , Intravenous, Continuous, Brian Farrar MD, Last Rate: 100 mL/hr at 11/11/21 1222, New Bag at 11/11/21 1222  •  tamsulosin (FLOMAX) 24 hr capsule 0.4 mg, 0.4 mg, Oral, Daily, Rambo Nicholas MD, 0.4 mg at 11/11/21 1009  •  vitamin D (ERGOCALCIFEROL) capsule 50,000 Units, 50,000 Units, Oral, Q7 Days, Brian Farrar MD, 50,000 Units at 11/11/21 1744    Allergies:    Patient has no known allergies.    Assessment:    Active Problems:  Patient Active Problem List   Diagnosis   • DERECK (acute kidney injury) (HCC)   • Severe malnutrition (CMS/HCC)   • Vitamin D deficiency       Urinary retention secondary to baseline BPH and exacerbated by multiple factors    Plan:    We have already started him on his tamsulosin so we will give a voiding trial tomorrow and start intermittent catheterization.      Rambo  Clement Nicholas MD    11/11/2021  17:57 EST

## 2021-11-11 NOTE — PROGRESS NOTES
"Daily progress note    Chief complaint  Doing better  Still eating poorly  No new complaints  Denies any chest pain shortness of breath palpitation    History of present illness  71-year-old white male with no medical problems and no home medication brought to the emergency room by the family with generalized weakness for 1 month failure to thrive not eating drinking for several days to week.  Patient has no fever cough chest pain shortness of breath abdominal pain nausea vomiting diarrhea.  Patient looks cachectic and work-up in ER revealed acute kidney injury with severe dehydration and hypernatremia also found to have UTI admit for management.  Patient remained fully awake alert and oriented x3.     REVIEW OF SYSTEMS  Unremarkable except generalized weakness     PHYSICAL EXAM   Blood pressure 95/62, pulse 92, temperature 98.6 °F (37 °C), temperature source Oral, resp. rate 16, height 177.8 cm (70\"), weight 70.5 kg (155 lb 6.8 oz), SpO2 99 %.    GENERAL: Awake and alert, acutely ill-appearing cachectic, not distressed  HEENT:  Unremarkable  CV: regular rhythm, tachycardic rate, no murmur  RESPIRATORY: normal effort, CTA  ABDOMEN: soft, nontender bowel sounds positive  MUSCULOSKELETAL: no deformity, FROM, no calf swelling or tenderness  NEURO: alert, slurred speech, moves all extremities, follows commands  SKIN: warm, dry     LAB RESULTS  Lab Results (last 24 hours)     Procedure Component Value Units Date/Time    Comprehensive Metabolic Panel [[id number]]  (Abnormal) Collected: 11/11/21 0541    Specimen: Blood Updated: 11/11/21 0644     Glucose 89 mg/dL      BUN 49 mg/dL      Creatinine 1.61 mg/dL      Sodium 144 mmol/L      Potassium 3.7 mmol/L      Comment: Slight hemolysis detected by analyzer. Results may be affected.        Chloride 118 mmol/L      CO2 19.3 mmol/L      Calcium 8.3 mg/dL      Total Protein 5.1 g/dL      Albumin 2.80 g/dL      ALT (SGPT) 81 U/L      AST (SGOT) 79 U/L      Alkaline Phosphatase 70 " U/L      Total Bilirubin 0.8 mg/dL      eGFR Non African Amer 43 mL/min/1.73      Globulin 2.3 gm/dL      A/G Ratio 1.2 g/dL      BUN/Creatinine Ratio 30.4     Anion Gap 6.7 mmol/L     Narrative:      GFR Normal >60  Chronic Kidney Disease <60  Kidney Failure <15      Vitamin D 25 Hydroxy [[id number]]  (Abnormal) Collected: 11/11/21 0541    Specimen: Blood Updated: 11/11/21 0643     25 Hydroxy, Vitamin D 21.2 ng/ml     Narrative:      Reference Range for Total Vitamin D 25(OH)     Deficiency <20.0 ng/mL   Insufficiency 21-29 ng/mL   Sufficiency  ng/mL  Toxicity >100 ng/ml    Results may be falsely increased if patient taking Biotin.      CBC & Differential [[id number]]  (Abnormal) Collected: 11/11/21 0541    Specimen: Blood Updated: 11/11/21 0608    Narrative:      The following orders were created for panel order CBC & Differential.  Procedure                               Abnormality         Status                     ---------                               -----------         ------                     CBC Auto Differential[[id number]]        Abnormal            Final result                 Please view results for these tests on the individual orders.    CBC Auto Differential [[id number]]  (Abnormal) Collected: 11/11/21 0541    Specimen: Blood Updated: 11/11/21 0608     WBC 6.51 10*3/mm3      RBC 4.37 10*6/mm3      Hemoglobin 13.9 g/dL      Hematocrit 40.5 %      MCV 92.7 fL      MCH 31.8 pg      MCHC 34.3 g/dL      RDW 14.1 %      RDW-SD 48.3 fl      MPV 12.2 fL      Platelets 59 10*3/mm3      Neutrophil % 61.1 %      Lymphocyte % 23.2 %      Monocyte % 11.2 %      Eosinophil % 2.9 %      Basophil % 0.5 %      Immature Grans % 1.1 %      Neutrophils, Absolute 3.98 10*3/mm3      Lymphocytes, Absolute 1.51 10*3/mm3      Monocytes, Absolute 0.73 10*3/mm3      Eosinophils, Absolute 0.19 10*3/mm3      Basophils, Absolute 0.03 10*3/mm3      Immature Grans, Absolute 0.07 10*3/mm3      nRBC 0.0 /100 WBC     Blood  Culture - Blood, Arm, Left [[id number]]  (Normal) Collected: 11/08/21 1611    Specimen: Blood from Arm, Left Updated: 11/10/21 1645     Blood Culture No growth at 2 days    Blood Culture - Blood, Arm, Right [[id number]]  (Normal) Collected: 11/08/21 1523    Specimen: Blood from Arm, Right Updated: 11/10/21 1545     Blood Culture No growth at 2 days        Imaging Results (Last 24 Hours)     ** No results found for the last 24 hours. **                 ECG 12 Lead  Component   Ref Range & Units 11/8/21 1546   QT Interval   ms 351              HEART RATE= 107  bpm  RR Interval= 560  ms  WA Interval= 129  ms  P Horizontal Axis= -10  deg  P Front Axis= 78  deg  QRSD Interval= 71  ms  QT Interval= 351  ms  QRS Axis= -60  deg  T Wave Axis= 57  deg  - ABNORMAL ECG -  Sinus tachycardia  Biatrial enlargement  LAD, consider left anterior fascicular block  ST elevation, consider inferior injury  NO PRIOR TRACING AVAILABLE FOR COMPARISON             Current Facility-Administered Medications:   •  allopurinol (ZYLOPRIM) tablet 100 mg, 100 mg, Oral, Daily, Fernando Ga MD, 100 mg at 11/11/21 1009  •  aspirin chewable tablet 81 mg, 81 mg, Oral, Daily, Fernando Ga MD, 81 mg at 11/10/21 1737  •  cefTRIAXone (ROCEPHIN) 1 g in sodium chloride 0.9 % 100 mL IVPB-VTB, 1 g, Intravenous, Q24H, Fernando Ga MD, Last Rate: 200 mL/hr at 11/10/21 1737, 1 g at 11/10/21 1737  •  famotidine (PEPCID) tablet 20 mg, 20 mg, Oral, BID AC, Fernando Ga MD, 20 mg at 11/11/21 0638  •  megestrol (MEGACE) 40 MG/ML suspension 400 mg, 400 mg, Oral, Daily, Fernando Ga MD, 400 mg at 11/11/21 1009  •  mirtazapine (REMERON) tablet 15 mg, 15 mg, Oral, Nightly, Fernando Ga MD, 15 mg at 11/10/21 2055  •  sodium chloride 0.45 % 925 mL with sodium bicarbonate 8.4 % 75 mEq infusion, , Intravenous, Continuous, Brian Farrar MD, Last Rate: 100 mL/hr at 11/11/21 1222, New Bag at 11/11/21 1222  •  tamsulosin (FLOMAX) 24 hr capsule 0.4 mg, 0.4 mg, Oral,  Daily, Rambo Nicholas MD, 0.4 mg at 11/11/21 1009  •  vitamin D (ERGOCALCIFEROL) capsule 50,000 Units, 50,000 Units, Oral, Q7 Days, Brian Farrar MD     ASSESSMENT  Acute kidney injury  Acute UTI with sepsis  Severe dehydration  Bilateral hydronephrosis  Hypernatremia  Hyperkalemia  Elevated troponin with no chest pain  Elevated blood sugar with no history of diabetes  Failure to thrive  Gastroesophageal reflux disease    PLAN  CPM  IVF  IV antibiotics   Flomax  Urology consult appreciated  Nephrology to follow patient  Stress ulcer DVT prophylaxis   Supportive care  PT/OT  Discussed with nursing staff  Follow closely and further recommendation current hospital course    STEVEN HERMAN MD

## 2021-11-11 NOTE — PROGRESS NOTES
Nutrition Services    Patient Name:  Pedro Peck  YOB: 1950  MRN: [id number]  Admit Date:  11/8/2021    Nutrition follow up.  Once again nothing saved on calorie count from yesterday.  Spoke with CNA again today.  Lunch ticket was saved today and she verbally reported breakfast intake.  She will document dinner on calorie count per her report.    RD to follow up again tomorrow for day 1 results.    Electronically signed by:  Daria Riddle RD  11/11/21 13:37 EST

## 2021-11-11 NOTE — PROGRESS NOTES
LOS: 3 days     Chief Complaint/ Reason for encounter: Acute kidney injury, severe hypernatremia  Chief Complaint   Patient presents with   • Weakness - Generalized         Subjective   Feels better today, eating and drinking little better  Denies any shortness of breath or chest pain  No fevers or chills  Improved appetite with no nausea or vomiting  No edema, improved urine output. Urine very dark, cola colored per RN    11/11 continues to improve.  Much more awake and alert.  Denies any fevers chills nausea vomiting shortness of breath chest pain or edema  White placed yesterday with over 2 L urine output, urology following and added Flomax      Medical history reviewed:  History of Present Illness    Subjective    History taken from: Patient and chart    Vital Signs  Temp:  [97 °F (36.1 °C)-98.6 °F (37 °C)] 98.6 °F (37 °C)  Heart Rate:  [51-92] 92  Resp:  [16] 16  BP: (87-97)/(49-66) 95/62       Wt Readings from Last 1 Encounters:   11/11/21 0500 70.5 kg (155 lb 6.8 oz)   11/10/21 0307 65.1 kg (143 lb 8.3 oz)   11/08/21 2115 60.8 kg (134 lb 0.6 oz)   11/08/21 1504 60.3 kg (133 lb)       Objective    Objective:  General Appearance: Somewhat malnourished, comfortable, chronically ill-appearing, in no acute distress and not in pain.  Awake, alert, oriented  HEENT: Mucous membranes moist, no injury, oropharynx clear  Lungs:  Normal effort and normal respiratory rate.  Breath sounds clear to auscultation.  No  respiratory distress.  No rales, decreased breath sounds or rhonchi.    Heart: Normal rate.  Regular rhythm.  S1 normal.  No murmur.   Abdomen: Abdomen is soft.  Bowel sounds are normal, no abdominal tenderness.  There is no rebound or guarding  Extremities: Normal range of motion. No significant edema of bilateral lower extremities, distal pulses intact  Neurological: No focal motor or sensory deficits, pupils reactive  Skin:  Warm and dry.  No rash or cyanosis.       Results Review:    Intake/Output:      Intake/Output Summary (Last 24 hours) at 11/11/2021 1517  Last data filed at 11/11/2021 0500  Gross per 24 hour   Intake 100 ml   Output 800 ml   Net -700 ml         DATA:  Radiology and Labs:  The following labs independently reviewed by me. Additional labs ordered for tomorrow a.m.  Interval notes, chart personally reviewed by me.   Old records independently reviewed showing no prior known history of chronic kidney disease  The following radiologic studies independently viewed by me, findings renal ultrasound shows marked right hydronephrosis, moderate left hydronephrosis and distended urinary bladder, moderately enlarged prostate  New problems include hydronephrosis  Discussed with patient and his nurse at bedside    Risk/ complexity of medical care/ medical decision making moderate risk    Labs:   Recent Results (from the past 24 hour(s))   Vitamin D 25 Hydroxy    Collection Time: 11/11/21  5:41 AM    Specimen: Blood   Result Value Ref Range    25 Hydroxy, Vitamin D 21.2 (L) 30.0 - 100.0 ng/ml   Comprehensive Metabolic Panel    Collection Time: 11/11/21  5:41 AM    Specimen: Blood   Result Value Ref Range    Glucose 89 65 - 99 mg/dL    BUN 49 (H) 8 - 23 mg/dL    Creatinine 1.61 (H) 0.76 - 1.27 mg/dL    Sodium 144 136 - 145 mmol/L    Potassium 3.7 3.5 - 5.2 mmol/L    Chloride 118 (H) 98 - 107 mmol/L    CO2 19.3 (L) 22.0 - 29.0 mmol/L    Calcium 8.3 (L) 8.6 - 10.5 mg/dL    Total Protein 5.1 (L) 6.0 - 8.5 g/dL    Albumin 2.80 (L) 3.50 - 5.20 g/dL    ALT (SGPT) 81 (H) 1 - 41 U/L    AST (SGOT) 79 (H) 1 - 40 U/L    Alkaline Phosphatase 70 39 - 117 U/L    Total Bilirubin 0.8 0.0 - 1.2 mg/dL    eGFR Non African Amer 43 (L) >60 mL/min/1.73    Globulin 2.3 gm/dL    A/G Ratio 1.2 g/dL    BUN/Creatinine Ratio 30.4 (H) 7.0 - 25.0    Anion Gap 6.7 5.0 - 15.0 mmol/L   CBC Auto Differential    Collection Time: 11/11/21  5:41 AM    Specimen: Blood   Result Value Ref Range    WBC 6.51 3.40 - 10.80 10*3/mm3    RBC 4.37 4.14 -  5.80 10*6/mm3    Hemoglobin 13.9 13.0 - 17.7 g/dL    Hematocrit 40.5 37.5 - 51.0 %    MCV 92.7 79.0 - 97.0 fL    MCH 31.8 26.6 - 33.0 pg    MCHC 34.3 31.5 - 35.7 g/dL    RDW 14.1 12.3 - 15.4 %    RDW-SD 48.3 37.0 - 54.0 fl    MPV 12.2 (H) 6.0 - 12.0 fL    Platelets 59 (L) 140 - 450 10*3/mm3    Neutrophil % 61.1 42.7 - 76.0 %    Lymphocyte % 23.2 19.6 - 45.3 %    Monocyte % 11.2 5.0 - 12.0 %    Eosinophil % 2.9 0.3 - 6.2 %    Basophil % 0.5 0.0 - 1.5 %    Immature Grans % 1.1 (H) 0.0 - 0.5 %    Neutrophils, Absolute 3.98 1.70 - 7.00 10*3/mm3    Lymphocytes, Absolute 1.51 0.70 - 3.10 10*3/mm3    Monocytes, Absolute 0.73 0.10 - 0.90 10*3/mm3    Eosinophils, Absolute 0.19 0.00 - 0.40 10*3/mm3    Basophils, Absolute 0.03 0.00 - 0.20 10*3/mm3    Immature Grans, Absolute 0.07 (H) 0.00 - 0.05 10*3/mm3    nRBC 0.0 0.0 - 0.2 /100 WBC       Radiology:  Imaging Results (Last 24 Hours)     ** No results found for the last 24 hours. **             Medications have been reviewed:  Current Facility-Administered Medications   Medication Dose Route Frequency Provider Last Rate Last Admin   • allopurinol (ZYLOPRIM) tablet 100 mg  100 mg Oral Daily Fernando Ga MD   100 mg at 11/11/21 1009   • aspirin chewable tablet 81 mg  81 mg Oral Daily Fernando Ga MD   81 mg at 11/10/21 1737   • cefTRIAXone (ROCEPHIN) 1 g in sodium chloride 0.9 % 100 mL IVPB-VTB  1 g Intravenous Q24H Fernando Ga  mL/hr at 11/10/21 1737 1 g at 11/10/21 1737   • famotidine (PEPCID) tablet 20 mg  20 mg Oral BID AC Fernando Ga MD   20 mg at 11/11/21 0638   • megestrol (MEGACE) 40 MG/ML suspension 400 mg  400 mg Oral Daily Fernando Ga MD   400 mg at 11/11/21 1009   • mirtazapine (REMERON) tablet 15 mg  15 mg Oral Nightly Fernando Ga MD   15 mg at 11/10/21 2055   • sodium chloride 0.45 % 925 mL with sodium bicarbonate 8.4 % 75 mEq infusion   Intravenous Continuous Brian Farrar  mL/hr at 11/11/21 1222 New Bag at 11/11/21 1222   •  tamsulosin (FLOMAX) 24 hr capsule 0.4 mg  0.4 mg Oral Daily Rambo Nicholas MD   0.4 mg at 11/11/21 1009   • vitamin D (ERGOCALCIFEROL) capsule 50,000 Units  50,000 Units Oral Q7 Days Brian Farrar MD           ASSESSMENT:  severe renal failure secondary to profound dehydration, improved with fluids  Profound hypernatremia, still high but much improved  Lactic acidosis from hypotension  Hypotension related to severe dehydration, mildly better  Elevated LFTs  Hypercalcemia secondary to dehydration  Elevated hemoglobin related to hemoconcentration/dehydration  Cachexia and malnutrition  History of autism and cognitive deficits  Hyperphosphatemia related to renal failure  Mild CK elevation, possibly some element of mild rhabdo  New, vitamin D deficiency      PLAN:  Renal function, volume and electrolytes continue to improve  We will change IV fluids to isotonic IVF including bicarb  Appreciate urology assistance.  White in place, Flomax added  Voiding trial in a few days  Likely can stop IV fluids tomorrow if oral intake adequate  Add oral ergocalciferol    IV Rocephin for UTI  nutrition following to assist with increased daily caloric intake/nutritional needs    Continue to monitor electrolytes and volume closely, avoid IV contrast and nephrotoxic medications   Follow-up a.m. labs    Brian Farrar MD   [facility]   Office phone number: [phone]  Answering service phone number: [phone]    11/11/21  15:17 EST    Dictation performed using Dragon dictation software

## 2021-11-11 NOTE — PLAN OF CARE
Goal Outcome Evaluation:  Plan of Care Reviewed With: patient        Progress: improving  Outcome Summary: Patient  resting at this time. Patient  with  intermittent  confusion.  White  catheter  patent to  BSd  with    dark  trevor  urine   drainaing  and  mod  amount  of  sediment.    IV  D5W  infusing  well at 125ml/hr.    SB  on the  monitor.  Nursing  will  continue  to monitor.

## 2021-11-11 NOTE — PLAN OF CARE
Goal Outcome Evaluation:  Plan of Care Reviewed With: patient        Progress: improving  Outcome Summary: Pt agreeable to work with PT this date. Demo's improvement in bed mob, SBA for sup<>sit and sit<>sup. Pt was able to perform STS to RW with Lorena, but once standing and attempting gait, pt is very unsteady and unsafe. modA x2 with heavy VC's, but pt has difficulty following cues. Only able to take side steps EOB, pt demo's strong posterior lean. Continues to benefit from skilled PT.

## 2021-11-11 NOTE — CONSULTS
In speaking with RN, patient would have great difficulty in understanding an Advance Directive and would be unable to complete.

## 2021-11-11 NOTE — PLAN OF CARE
Problem: Adult Inpatient Plan of Care  Goal: Plan of Care Review  Outcome: Ongoing, Progressing  Flowsheets (Taken 11/11/2021 1834)  Plan of Care Reviewed With: patient  Outcome Summary: Compliant w/ care.  Eating all meals. Meds crushed in applesauce. IVF w/ bicarb per renal. No c/o pain or nausea.  Goal: Patient-Specific Goal (Individualized)  Outcome: Ongoing, Progressing  Goal: Absence of Hospital-Acquired Illness or Injury  Outcome: Ongoing, Progressing  Intervention: Identify and Manage Fall Risk  Recent Flowsheet Documentation  Taken 11/11/2021 1420 by Mariana Roth RN  Safety Promotion/Fall Prevention:   fall prevention program maintained   safety round/check completed  Taken 11/11/2021 1200 by Mariana Roth RN  Safety Promotion/Fall Prevention:   fall prevention program maintained   safety round/check completed  Taken 11/11/2021 1000 by Mariana Roth RN  Safety Promotion/Fall Prevention:   fall prevention program maintained   safety round/check completed  Taken 11/11/2021 0800 by Mariana Roth RN  Safety Promotion/Fall Prevention:   fall prevention program maintained   safety round/check completed  Goal: Optimal Comfort and Wellbeing  Outcome: Ongoing, Progressing  Goal: Readiness for Transition of Care  Outcome: Ongoing, Progressing     Problem: Fall Injury Risk  Goal: Absence of Fall and Fall-Related Injury  Outcome: Ongoing, Progressing  Intervention: Promote Injury-Free Environment  Recent Flowsheet Documentation  Taken 11/11/2021 1420 by Mariana Roth RN  Safety Promotion/Fall Prevention:   fall prevention program maintained   safety round/check completed  Taken 11/11/2021 1200 by Mariana Roth RN  Safety Promotion/Fall Prevention:   fall prevention program maintained   safety round/check completed  Taken 11/11/2021 1000 by Mariana Roth RN  Safety Promotion/Fall Prevention:   fall prevention program maintained   safety round/check completed  Taken 11/11/2021 0800  by Mariana Roth RN  Safety Promotion/Fall Prevention:   fall prevention program maintained   safety round/check completed     Problem: Electrolyte Imbalance (Acute Kidney Injury/Impairment)  Goal: Serum Electrolyte Balance  Outcome: Ongoing, Progressing     Problem: Fluid Imbalance (Acute Kidney Injury/Impairment)  Goal: Optimal Fluid Balance  Outcome: Ongoing, Progressing     Problem: Hematologic Alteration (Acute Kidney Injury/Impairment)  Goal: Hemoglobin, Hematocrit and Platelets Within Normal Range  Outcome: Ongoing, Progressing     Problem: Oral Intake Inadequate (Acute Kidney Injury/Impairment)  Goal: Optimal Nutrition Intake  Outcome: Ongoing, Progressing     Problem: Renal Function Impairment (Acute Kidney Injury/Impairment)  Goal: Effective Renal Function  Outcome: Ongoing, Progressing     Problem: Skin Injury Risk Increased  Goal: Skin Health and Integrity  Outcome: Ongoing, Progressing     Problem: Malnutrition  Goal: Improved Nutritional Intake  Outcome: Ongoing, Progressing   Goal Outcome Evaluation:  Plan of Care Reviewed With: patient           Outcome Summary: Compliant w/ care.  Eating all meals. Meds crushed in applesauce. IVF w/ bicarb per renal. No c/o pain or nausea.

## 2021-11-11 NOTE — THERAPY TREATMENT NOTE
Patient Name: Pedro Peck  : 1950    MRN: [id number]                              Today's Date: 2021       Admit Date: 2021    Visit Dx:     ICD-10-CM ICD-9-CM   1. DERECK (acute kidney injury) (HCC)  N17.9 584.9   2. Hypotension, unspecified hypotension type  I95.9 458.9   3. Sepsis, due to unspecified organism, unspecified whether acute organ dysfunction present (HCC)  A41.9 038.9     995.91   4. Hypernatremia  E87.0 276.0   5. Lactic acidosis  E87.2 276.2   6. Elevated troponin  R77.8 790.6     Patient Active Problem List   Diagnosis   • DERECK (acute kidney injury) (HCC)   • Severe malnutrition (CMS/HCC)     Past Medical History:   Diagnosis Date   • Autism      History reviewed. No pertinent surgical history.   General Information     Row Name 21          Physical Therapy Time and Intention    Document Type therapy note (daily note)  -DB     Mode of Treatment individual therapy; physical therapy  -DB     Row Name 21 112          General Information    Patient Profile Reviewed yes  -DB     Existing Precautions/Restrictions fall  -DB           User Key  (r) = Recorded By, (t) = Taken By, (c) = Cosigned By    Initials Name Provider Type    DB Diann Mahan PT Physical Therapist               Mobility     Row Name 21          Bed Mobility    Bed Mobility supine-sit; sit-supine  -DB     Supine-Sit [place] (Bed Mobility) standby assist; verbal cues  -DB     Sit-Supine [place] (Bed Mobility) standby assist; verbal cues  -DB     Assistive Device (Bed Mobility) bed rails; head of bed elevated  -DB     Row Name 21 112          Sit-Stand Transfer    Sit-Stand [place] (Transfers) minimum assist (75% patient effort); verbal cues; nonverbal cues (demo/gesture)  -DB     Assistive Device (Sit-Stand Transfers) walker, front-wheeled  -DB     Row Name 21          Gait/Stairs (Locomotion)    [place] Level (Gait) moderate assist (50% patient  effort); 2 person assist; verbal cues; nonverbal cues (demo/gesture)  -DB     Assistive Device (Gait) walker, front-wheeled  -DB     Distance in Feet (Gait) 5' x 3 side steps at EOB  -DB     Deviations/Abnormal Patterns (Gait) kevyn decreased; gait speed decreased; stride length decreased; base of support, narrow; festinating/shuffling; weight shifting decreased  -DB     Bilateral Gait Deviations forward flexed posture; heel strike decreased  -DB     Comment (Gait/Stairs) pt very unsafe with ambulation and unsteady  -DB           User Key  (r) = Recorded By, (t) = Taken By, (c) = Cosigned By    Initials Name Provider Type    DB Diann Mahan, JAMES Physical Therapist               Obj/Interventions     Row Name 11/11/21 1123          Balance    Balance Assessment sitting static balance; sitting dynamic balance; standing static balance; standing dynamic balance  -DB     Static Sitting Balance mild impairment  -DB     Dynamic Sitting Balance mild impairment  -DB     Static Standing Balance severe impairment; supported  -DB     Dynamic Standing Balance severe impairment; supported  -DB     Balance Interventions sitting; standing; sit to stand  -DB           User Key  (r) = Recorded By, (t) = Taken By, (c) = Cosigned By    Initials Name Provider Type    Diann Bassett, JAMES Physical Therapist               Goals/Plan    No documentation.                Clinical Impression     Row Name 11/11/21 1123          Plan of Care Review    Plan of Care Reviewed With patient  -DB     Progress improving  -DB     Outcome Summary Pt agreeable to work with PT this date. Demo's improvement in bed mob, SBA for sup<>sit and sit<>sup. Pt was able to perform STS to RW with Lorena, but once standing and attempting gait, pt is very unsteady and unsafe. modA x2 with heavy VC's, but pt has difficulty following cues. Only able to take side steps EOB, pt demo's strong posterior lean. Continues to benefit from skilled PT.  -DB     Row Name  11/11/21 1123          Vital Signs    O2 Delivery Pre Treatment room air  -DB     O2 Delivery Intra Treatment room air  -DB     O2 Delivery Post Treatment room air  -DB     Pre Patient Position Supine  -DB     Intra Patient Position Standing  -DB     Post Patient Position Supine  -DB     Row Name 11/11/21 1123          Positioning and Restraints    Pre-Treatment Position in bed  -DB     Post Treatment Position bed  -DB     In Bed supine; call light within reach; encouraged to call for assist; exit alarm on  -DB           User Key  (r) = Recorded By, (t) = Taken By, (c) = Cosigned By    Initials Name Provider Type    Diann Bassett, JAMES Physical Therapist               Outcome Measures     Row Name 11/11/21 1126          How much help from another person do you currently need...    Turning from your back to your side while in flat bed without using bedrails? 4  -DB     Moving from lying on back to sitting on the side of a flat bed without bedrails? 3  -DB     Moving to and from a bed to a chair (including a wheelchair)? 2  -DB     Standing up from a chair using your arms (e.g., wheelchair, bedside chair)? 2  -DB     Climbing 3-5 steps with a railing? 1  -DB     To walk in hospital room? 2  -DB     AM-PAC 6 Clicks Score (PT) 14  -DB     Row Name 11/11/21 1126          Functional Assessment    Outcome Measure Options AM-PAC 6 Clicks Basic Mobility (PT)  -DB           User Key  (r) = Recorded By, (t) = Taken By, (c) = Cosigned By    Initials Name Provider Type    Diann Bassett, JAMES Physical Therapist                             Physical Therapy Education                 Title: PT OT SLP Therapies (In Progress)     Topic: Physical Therapy (Done)     Point: Mobility training (Done)     Learning Progress Summary           Patient Acceptance, E, VU by DB at 11/11/2021 1127    Acceptance, E, VU,NR by CF at 11/10/2021 1217                   Point: Home exercise program (Done)     Learning Progress Summary            Patient Acceptance, E, VU by DB at 11/11/2021 1127    Acceptance, E, VU,NR by CF at 11/10/2021 1217                   Point: Body mechanics (Done)     Learning Progress Summary           Patient Acceptance, E, VU by DB at 11/11/2021 1127    Acceptance, E, VU,NR by CF at 11/10/2021 1217                   Point: Precautions (Done)     Learning Progress Summary           Patient Acceptance, E, VU by DB at 11/11/2021 1127    Acceptance, E, VU,NR by CF at 11/10/2021 1217                               User Key     Initials Effective Dates Name Provider Type Discipline    DB 06/16/21 -  Diann Mahan, PT Physical Therapist PT    CF 06/16/21 -  Gerri Cutler PT Physical Therapist PT              PT Recommendation and Plan     Plan of Care Reviewed With: patient  Progress: improving  Outcome Summary: Pt agreeable to work with PT this date. Demo's improvement in bed mob, SBA for sup<>sit and sit<>sup. Pt was able to perform STS to RW with Lorena, but once standing and attempting gait, pt is very unsteady and unsafe. modA x2 with heavy VC's, but pt has difficulty following cues. Only able to take side steps EOB, pt demo's strong posterior lean. Continues to benefit from skilled PT.     Time Calculation:    PT Charges     Row Name 11/11/21 1127             Time Calculation    Start Time 1048  -DB      Stop Time 1059  -DB      Time Calculation (min) 11 min  -DB      PT Received On 11/11/21  -DB      PT - Next Appointment 11/12/21  -DB              Time Calculation- PT    Total Timed Code Minutes- PT 11 minute(s)  -DB            User Key  (r) = Recorded By, (t) = Taken By, (c) = Cosigned By    Initials Name Provider Type    DB Diann Mahan, PT Physical Therapist              Therapy Charges for Today     Code Description Service Date Service Provider Modifiers Qty    90263187481 HC PT THER PROC EA 15 MIN 11/11/2021 Diann Mahan PT GP 1          PT G-Codes  Outcome Measure Options: AM-PAC 6 Clicks Basic Mobility  (PT)  AM-PAC 6 Clicks Score (PT): 14  AM-PAC 6 Clicks Score (OT): 15    Diann Mahan, PT  11/11/2021

## 2021-11-12 LAB
ALBUMIN SERPL-MCNC: 2.7 G/DL (ref 3.5–5.2)
ALBUMIN/GLOB SERPL: 1.1 G/DL
ALP SERPL-CCNC: 72 U/L (ref 39–117)
ALT SERPL W P-5'-P-CCNC: 70 U/L (ref 1–41)
ANION GAP SERPL CALCULATED.3IONS-SCNC: 8.1 MMOL/L (ref 5–15)
AST SERPL-CCNC: 87 U/L (ref 1–40)
BASOPHILS # BLD AUTO: 0.03 10*3/MM3 (ref 0–0.2)
BASOPHILS NFR BLD AUTO: 0.4 % (ref 0–1.5)
BILIRUB SERPL-MCNC: 0.6 MG/DL (ref 0–1.2)
BUN SERPL-MCNC: 40 MG/DL (ref 8–23)
BUN/CREAT SERPL: 27.8 (ref 7–25)
CALCIUM SPEC-SCNC: 8.4 MG/DL (ref 8.6–10.5)
CHLORIDE SERPL-SCNC: 115 MMOL/L (ref 98–107)
CO2 SERPL-SCNC: 20.9 MMOL/L (ref 22–29)
CREAT SERPL-MCNC: 1.44 MG/DL (ref 0.76–1.27)
DEPRECATED RDW RBC AUTO: 47.1 FL (ref 37–54)
EOSINOPHIL # BLD AUTO: 0.21 10*3/MM3 (ref 0–0.4)
EOSINOPHIL NFR BLD AUTO: 2.8 % (ref 0.3–6.2)
ERYTHROCYTE [DISTWIDTH] IN BLOOD BY AUTOMATED COUNT: 14.3 % (ref 12.3–15.4)
GFR SERPL CREATININE-BSD FRML MDRD: 48 ML/MIN/1.73
GLOBULIN UR ELPH-MCNC: 2.4 GM/DL
GLUCOSE SERPL-MCNC: 71 MG/DL (ref 65–99)
HCT VFR BLD AUTO: 40.4 % (ref 37.5–51)
HGB BLD-MCNC: 14.2 G/DL (ref 13–17.7)
IMM GRANULOCYTES # BLD AUTO: 0.08 10*3/MM3 (ref 0–0.05)
IMM GRANULOCYTES NFR BLD AUTO: 1.1 % (ref 0–0.5)
LYMPHOCYTES # BLD AUTO: 1.79 10*3/MM3 (ref 0.7–3.1)
LYMPHOCYTES NFR BLD AUTO: 24 % (ref 19.6–45.3)
MCH RBC QN AUTO: 32.1 PG (ref 26.6–33)
MCHC RBC AUTO-ENTMCNC: 35.1 G/DL (ref 31.5–35.7)
MCV RBC AUTO: 91.2 FL (ref 79–97)
MONOCYTES # BLD AUTO: 0.75 10*3/MM3 (ref 0.1–0.9)
MONOCYTES NFR BLD AUTO: 10.1 % (ref 5–12)
NEUTROPHILS NFR BLD AUTO: 4.59 10*3/MM3 (ref 1.7–7)
NEUTROPHILS NFR BLD AUTO: 61.6 % (ref 42.7–76)
NRBC BLD AUTO-RTO: 0 /100 WBC (ref 0–0.2)
PLATELET # BLD AUTO: 57 10*3/MM3 (ref 140–450)
PMV BLD AUTO: 12.7 FL (ref 6–12)
POTASSIUM SERPL-SCNC: 4.1 MMOL/L (ref 3.5–5.2)
PROT SERPL-MCNC: 5.1 G/DL (ref 6–8.5)
RBC # BLD AUTO: 4.43 10*6/MM3 (ref 4.14–5.8)
SODIUM SERPL-SCNC: 144 MMOL/L (ref 136–145)
T4 FREE SERPL-MCNC: 0.95 NG/DL (ref 0.93–1.7)
URATE SERPL-MCNC: 6.2 MG/DL (ref 3.4–7)
WBC # BLD AUTO: 7.45 10*3/MM3 (ref 3.4–10.8)

## 2021-11-12 PROCEDURE — 25010000002 CEFTRIAXONE PER 250 MG: Performed by: HOSPITALIST

## 2021-11-12 PROCEDURE — 84550 ASSAY OF BLOOD/URIC ACID: CPT | Performed by: INTERNAL MEDICINE

## 2021-11-12 PROCEDURE — 85025 COMPLETE CBC W/AUTO DIFF WBC: CPT | Performed by: HOSPITALIST

## 2021-11-12 PROCEDURE — 97110 THERAPEUTIC EXERCISES: CPT

## 2021-11-12 PROCEDURE — 80053 COMPREHEN METABOLIC PANEL: CPT | Performed by: HOSPITALIST

## 2021-11-12 PROCEDURE — 84439 ASSAY OF FREE THYROXINE: CPT | Performed by: HOSPITALIST

## 2021-11-12 PROCEDURE — 97116 GAIT TRAINING THERAPY: CPT

## 2021-11-12 PROCEDURE — 97530 THERAPEUTIC ACTIVITIES: CPT

## 2021-11-12 RX ORDER — ACETAMINOPHEN 325 MG/1
650 TABLET ORAL EVERY 4 HOURS PRN
Status: DISCONTINUED | OUTPATIENT
Start: 2021-11-12 | End: 2021-11-17 | Stop reason: HOSPADM

## 2021-11-12 RX ORDER — SODIUM BICARBONATE 650 MG/1
650 TABLET ORAL 4 TIMES DAILY
Status: DISCONTINUED | OUTPATIENT
Start: 2021-11-12 | End: 2021-11-12

## 2021-11-12 RX ORDER — SODIUM BICARBONATE 650 MG/1
650 TABLET ORAL 2 TIMES DAILY
Status: DISCONTINUED | OUTPATIENT
Start: 2021-11-12 | End: 2021-11-13

## 2021-11-12 RX ADMIN — SODIUM BICARBONATE: 84 INJECTION, SOLUTION INTRAVENOUS at 02:00

## 2021-11-12 RX ADMIN — TAMSULOSIN HYDROCHLORIDE 0.4 MG: 0.4 CAPSULE ORAL at 10:41

## 2021-11-12 RX ADMIN — ASPIRIN 81 MG: 81 TABLET, CHEWABLE ORAL at 18:06

## 2021-11-12 RX ADMIN — SODIUM BICARBONATE 650 MG: 650 TABLET ORAL at 20:22

## 2021-11-12 RX ADMIN — SODIUM BICARBONATE 650 MG: 650 TABLET ORAL at 15:13

## 2021-11-12 RX ADMIN — MEGESTROL ACETATE 400 MG: 40 SUSPENSION ORAL at 10:41

## 2021-11-12 RX ADMIN — METOPROLOL TARTRATE 12.5 MG: 25 TABLET, FILM COATED ORAL at 20:22

## 2021-11-12 RX ADMIN — FAMOTIDINE 20 MG: 20 TABLET, FILM COATED ORAL at 06:34

## 2021-11-12 RX ADMIN — ALLOPURINOL 100 MG: 100 TABLET ORAL at 10:41

## 2021-11-12 RX ADMIN — FAMOTIDINE 20 MG: 20 TABLET, FILM COATED ORAL at 18:05

## 2021-11-12 RX ADMIN — CEFTRIAXONE 1 G: 1 INJECTION, POWDER, FOR SOLUTION INTRAMUSCULAR; INTRAVENOUS at 18:05

## 2021-11-12 RX ADMIN — MIRTAZAPINE 15 MG: 15 TABLET, FILM COATED ORAL at 20:22

## 2021-11-12 NOTE — NURSING NOTE
Call placed to Dr. Farrar r/t  Pt unable to void after attempt, bladder scan 515. Noted blood in penis, c/o pressure.

## 2021-11-12 NOTE — PLAN OF CARE
Goal Outcome Evaluation:  Plan of Care Reviewed With: patient        Progress: improving  Outcome Summary: Pt tolerated treatment well this date. Increased gait distance to 60ft w/ Rw and min A x2. Pt unsteady and impulsive at times. Required frequent cues for safety. Instructed pt on a few seated LE exercises and encouraged pt to get up w/ nsg during the day.

## 2021-11-12 NOTE — PLAN OF CARE
Goal Outcome Evaluation:  Plan of Care Reviewed With: patient        Progress: no change  Outcome Summary: Pt bladder scanned again after pt attempt to void on own but not successful, noted blood in brief , pt f/c pulled this am, denies pain. Call placed to Dr. Spivey notify of no void and possible need to have in and out cath, pt confused, no sensation to void, but c/o pressure. Ate well breakfast and lunch, no c/o nausea, sister called and update given. Awaiting call from day.

## 2021-11-12 NOTE — DISCHARGE PLACEMENT REQUEST
"Caprice Peck (71 y.o. Male)             Date of Birth Social Security Number Address Home Phone MRN    1950  [address]  [place] [id number] [phone] [id number]    Hindu Marital Status             Mu-ism Single       Admission Date Admission Type Admitting Provider Attending Provider Department, Room/Bed    11/8/21 Emergency Fernando Ga MD Ahmed, Aftab, MD [address], [address]669/1    Discharge Date Discharge Disposition Discharge Destination                         Attending Provider: Fernando Ga MD    Allergies: No Known Allergies    Isolation: None   Infection: None   Code Status: CPR   Advance Care Planning Activity    Ht: 177.8 cm (70\")   Wt: 65.5 kg (144 lb 6.4 oz)    Admission Cmt: None   Principal Problem: None                Active Insurance as of 11/8/2021     Primary Coverage     Payor Plan Insurance Group Employer/Plan Group    MEDICARE MEDICARE A & B      Payor Plan Address Payor Plan Phone Number Payor Plan Fax Number Effective Dates    [address] [phone]  4/1/2016 - None Entered    formerly [facility] [id number]       Subscriber Name Subscriber Birth Date Member ID       CAPRICE PECK 1950 [id number]           Secondary Coverage     Payor Plan Insurance Group Employer/Plan Group    [facility] SUPP [id number]     Payor Plan Address Payor Plan Phone Number Payor Plan Fax Number Effective Dates    [address]   12/1/2016 - None Entered    [facility] [id number]       Subscriber Name Subscriber Birth Date Member ID       CAPRICE PECK 1950 [id number]                 Emergency Contacts      (Rel.) Home Phone Work Phone Mobile Phone    ANDRE SAMAYOA (Sister) [phone] -- [phone]    Phillip Peck (Brother) [phone] -- --              "

## 2021-11-12 NOTE — THERAPY TREATMENT NOTE
Patient Name: Pedro Peck  : 1950    MRN: [id number]                              Today's Date: 2021       Admit Date: 2021    Visit Dx:     ICD-10-CM ICD-9-CM   1. DERECK (acute kidney injury) (HCC)  N17.9 584.9   2. Hypotension, unspecified hypotension type  I95.9 458.9   3. Sepsis, due to unspecified organism, unspecified whether acute organ dysfunction present (HCC)  A41.9 038.9     995.91   4. Hypernatremia  E87.0 276.0   5. Lactic acidosis  E87.2 276.2   6. Elevated troponin  R77.8 790.6     Patient Active Problem List   Diagnosis   • DERECK (acute kidney injury) (HCC)   • Severe malnutrition (CMS/HCC)   • Vitamin D deficiency     Past Medical History:   Diagnosis Date   • Autism      History reviewed. No pertinent surgical history.   General Information     Row Name 21 1544          Physical Therapy Time and Intention    Document Type therapy note (daily note)  -     Mode of Treatment physical therapy  -     Row Name 21 1544          General Information    Existing Precautions/Restrictions fall  -[facility] Name 21 1544          Cognition    Orientation Status (Cognition) oriented x 3  -           User Key  (r) = Recorded By, (t) = Taken By, (c) = Cosigned By    Initials Name Provider Type     Rylee Harvey PTA Physical Therapy Assistant               Mobility     Row Name 21 1544          Bed Mobility    Comment (Bed Mobility) up in chair  -     Row Name 21 1544          Sit-Stand Transfer    Sit-Stand [place] (Transfers) contact guard; minimum assist (75% patient effort)  -     Assistive Device (Sit-Stand Transfers) walker, front-wheeled  -     Row Name 21 1544          Gait/Stairs (Locomotion)    [place] Level (Gait) minimum assist (75% patient effort); 2 person assist  -     Assistive Device (Gait) walker, front-wheeled  -     Distance in Feet (Gait) 60  -     Deviations/Abnormal Patterns (Gait) antalgic; kevyn  decreased; festinating/shuffling; stride length decreased  -     Bilateral Gait Deviations forward flexed posture  -     Comment (Gait/Stairs) very unsteady and can be impulsive at times  -SM           User Key  (r) = Recorded By, (t) = Taken By, (c) = Cosigned By    Initials Name Provider Type    Rylee Maldonado PTA Physical Therapy Assistant               Obj/Interventions     Row Name 11/12/21 1545          Motor Skills    Therapeutic Exercise --  seated AP, LAQ, marches x20 reps  -           User Key  (r) = Recorded By, (t) = Taken By, (c) = Cosigned By    Initials Name Provider Type    Rylee Maldonado PTA Physical Therapy Assistant               Goals/Plan    No documentation.                Clinical Impression     Row Name 11/12/21 1547          Pain    Additional Documentation Pain Scale: Numbers Pre/Post-Treatment (Group)  -SM     Row Name 11/12/21 1547          Pain Scale: Numbers Pre/Post-Treatment    Pretreatment Pain Rating 0/10 - no pain  -     Posttreatment Pain Rating 0/10 - no pain  -SM     Row Name 11/12/21 1547          Positioning and Restraints    Pre-Treatment Position sitting in chair/recliner  -     Post Treatment Position chair  -SM     In Chair reclined; call light within reach; encouraged to call for assist; exit alarm on  -           User Key  (r) = Recorded By, (t) = Taken By, (c) = Cosigned By    Initials Name Provider Type    Rylee Maldonado PTA Physical Therapy Assistant               Outcome Measures     Row Name 11/12/21 1547 11/12/21 0800       How much help from another person do you currently need...    Turning from your back to your side while in flat bed without using bedrails? 3  -SM 3  -JS    Moving from lying on back to sitting on the side of a flat bed without bedrails? 3  -SM 3  -JS    Moving to and from a bed to a chair (including a wheelchair)? 3  -SM 3  -JS    Standing up from a chair using your arms (e.g., wheelchair, bedside chair)?  3  - 3  -JS    Climbing 3-5 steps with a railing? 2  - 2  -JS    To walk in hospital room? 3  -SM 2  -JS    AM-PAC 6 Clicks Score (PT) 17  - 16  -JS    Row Name 11/12/21 1547 11/12/21 1216       Functional Assessment    Outcome Measure Options AM-PAC 6 Clicks Basic Mobility (PT)  - AM-PAC 6 Clicks Daily Activity (OT)  -          User Key  (r) = Recorded By, (t) = Taken By, (c) = Cosigned By    Initials Name Provider Type     Rylee Harvey PTA Physical Therapy Assistant    Alonso Camargo, RN Registered Nurse    Violet Waldron OT Occupational Therapist                             Physical Therapy Education                 Title: PT OT SLP Therapies (In Progress)     Topic: Physical Therapy (Done)     Point: Mobility training (Done)     Learning Progress Summary           Patient Acceptance, E,TB,D, VU,NR by  at 11/12/2021 1548    Acceptance, E, VU by  at 11/11/2021 1127    Acceptance, E, VU,NR by  at 11/10/2021 1217                   Point: Home exercise program (Done)     Learning Progress Summary           Patient Acceptance, E,TB,D, VU,NR by  at 11/12/2021 1548    Acceptance, E, VU by  at 11/11/2021 1127    Acceptance, E, VU,NR by  at 11/10/2021 1217                   Point: Body mechanics (Done)     Learning Progress Summary           Patient Acceptance, E,TB,D, VU,NR by  at 11/12/2021 1548    Acceptance, E, VU by  at 11/11/2021 1127    Acceptance, E, VU,NR by  at 11/10/2021 1217                   Point: Precautions (Done)     Learning Progress Summary           Patient Acceptance, E,TB,D, VU,NR by  at 11/12/2021 1548    Acceptance, E, VU by DB at 11/11/2021 1127    Acceptance, E, VU,NR by  at 11/10/2021 1217                               User Key     Initials Effective Dates Name Provider Type Discipline     03/07/18 -  Rylee Harvey, MEGAN Physical Therapy Assistant PT    DB 06/16/21 -  Diann Mahan, PT Physical Therapist PT    CF 06/16/21 -  Omayra  Gerri, PT Physical Therapist PT              PT Recommendation and Plan     Plan of Care Reviewed With: patient  Progress: improving  Outcome Summary: Pt tolerated treatment well this date. Increased gait distance to 60ft w/ Rw and min A x2. Pt unsteady and impulsive at times. Required frequent cues for safety. Instructed pt on a few seated LE exercises and encouraged pt to get up w/ nsg during the day.     Time Calculation:    PT Charges     Row Name 11/12/21 1559             Time Calculation    Start Time 1041  -      Stop Time 1104  -      Time Calculation (min) 23 min  -      PT Received On 11/12/21  -      PT - Next Appointment 11/15/21  -            User Key  (r) = Recorded By, (t) = Taken By, (c) = Cosigned By    Initials Name Provider Type    Rylee Maldonado PTA Physical Therapy Assistant              Therapy Charges for Today     Code Description Service Date Service Provider Modifiers Qty    14959077801 HC PT THER PROC EA 15 MIN 11/12/2021 Rylee Harvey PTA GP 1    41358030152 HC GAIT TRAINING EA 15 MIN 11/12/2021 Rylee Harvey PTA GP 1    13346027332 HC PT THER SUPP EA 15 MIN 11/12/2021 Rylee Harvey PTA GP 2          PT G-Codes  Outcome Measure Options: AM-PAC 6 Clicks Basic Mobility (PT)  AM-PAC 6 Clicks Score (PT): 17  AM-PAC 6 Clicks Score (OT): 16    Rylee Harvey PTA  11/12/2021

## 2021-11-12 NOTE — PROGRESS NOTES
"Adult Nutrition  Assessment/PES    Patient Name:  Pedro Peck  YOB: 1950  MRN: [id number]  Admit Date:  11/8/2021    Assessment Date:  11/12/2021    Comments:  Nutrition follow up.  Calorie count day 1 results: 1284 kcal (68% estimated needs), 53 grams protein (73% estimated needs).  Eating well.  Megace started.  Above intake came from breakfast and lunch yesterday only.  Dinner not recorded.    RD to follow up tomorrow for day 2 results.     Reason for Assessment     Row Name 11/12/21 1359          Reason for Assessment    Reason For Assessment follow-up protocol; calorie count order                Nutrition/Diet History     Row Name 11/12/21 1401          Nutrition/Diet History    Typical Food/Fluid Intake pt reports good appetite, eating pretty well                Anthropometrics     Row Name 11/12/21 1401          Anthropometrics    Height 177.8 cm (70\")            Admit Weight    Admit Weight --  144# 11/12            Ideal Body Weight (IBW)    Ideal Body Weight (IBW) (kg) 76.48            Body Mass Index (BMI)    BMI Assessment BMI 18.5-24.9: normal  20.67                Labs/Tests/Procedures/Meds     Row Name 11/12/21 1403          Labs/Procedures/Meds    Lab Results Reviewed reviewed, pertinent     Lab Results Comments Creat, BUN, Ca, GFR, ALT, AST, Alb, Platelets            Diagnostic Tests/Procedures    Diagnostic Test/Procedure Reviewed reviewed, pertinent            Medications    Pertinent Medications Reviewed reviewed, pertinent     Pertinent Medications Comments pepcid, megace, remeron, vit D                Physical Findings     Row Name 11/12/21 1406          Physical Findings    Overall Physical Appearance generalized wasting; loss of subcutaneous fat; loss of muscle mass                Estimated/Assessed Needs     Row Name 11/12/21 1401          Calculation Measurements    Height 177.8 cm (70\")                Nutrition Prescription Ordered     Row Name 11/12/21 1406          " Nutrition Prescription PO    Current PO Diet Soft Texture     Texture Chopped     Fluid Consistency Thin     Supplement Boost Plus (Ensure Enlive, Ensure Plus)     Supplement Frequency 3 times a day                Evaluation of Received Nutrient/Fluid Intake     Row Name 11/12/21 1408          Calories Evaluation    Oral Calories (kcal) 1284     % of Kcal Needs 68            Protein Evaluation    Oral Protein (gm) 53     % of Protein Needs 73                     Problem/Interventions:           Intervention Goal     Row Name 11/12/21 1405          Intervention Goal    General Maintain nutrition; Disease management/therapy; Meet nutritional needs for age/condition     PO Maintain intake     Weight Appropriate weight gain                Nutrition Intervention     Row Name 11/12/21 1406          Nutrition Intervention    RD/Tech Action Follow Tx progress; Care plan reviewd     Recommended/Ordered Supplement                  Education/Evaluation     Row Name 11/12/21 4602          Education    Education Will Instruct as appropriate            Monitor/Evaluation    Monitor Per protocol; PO intake; Supplement intake; Pertinent labs; Weight; Skin status; Symptoms                 Electronically signed by:  Daria Riddle RD  11/12/21 14:09 EST

## 2021-11-12 NOTE — PLAN OF CARE
Goal Outcome Evaluation:  Plan of Care Reviewed With: patient        Progress: improving  Outcome Summary: Patient  resting at this  time.  Continue  on  Biacrb  drip  anfd  tolerating  well.   No  complaints  of  pain  voiced.  White  catheter to  BSD  with  tea  colored  urine  draining.  To  be  discontinued  this  morning.    Took  and  tolerated  meds  well  in  applesauce.   Drinking  well.  SR on  monitor.  Nursing  will  continue  to  monitor.

## 2021-11-12 NOTE — PLAN OF CARE
Goal Outcome Evaluation:  Plan of Care Reviewed With: patient     Progress: improving  Outcome Summary: Patient seen for OT/PT session this date. Patient UIC upon arrival, completing x2 STS this date with CGA x2. Impulsivitiy noted at times throughout. Patient completed upright mobility with min A x2 using Rwx with mod-max vc's for progression and safe use of AD. Patient completed g/h task with s/up in chair supported. BUE ther ex in functional planes completed 2 x10 to increased UB strength required for ADLs/functional transfers. Continue as pt tolerates to maximize rehab potential while in acute stay.    Patient wearing mask during mobility in hallway. OT in mask, gloves, glasses, and hand hygiene performed.

## 2021-11-12 NOTE — PROGRESS NOTES
Discharge Planning Assessment  [facility]     Patient Name: Pedro Peck  MRN: [id number]  Today's Date: 11/12/2021    Admit Date: 11/8/2021     Discharge Needs Assessment    No documentation.                Discharge Plan     Row Name 11/12/21 1448       Plan    Plan Plan skilled care at accepting facility.  YOSSI Bauer RN    Patient/Family in Agreement with Plan yes    Plan Comments Spoke to pt at bedside.  Explained to pt that PT recommending SNF.  Pt states he would like to go somewhere close to home.  Called and spoke with pt's sister ( Malena Lopez  [phone]) who confirms pt would benefit from skilled care.    Pt is agreeable to referral to 1) [facility]  ( [phone]) called to follow and 2) [facility]  ( [phone]) called to follow.  Plan skilled care at an accepting facility.   YOSSI Bauer RN              Continued Care and Services - Admitted Since 11/8/2021     Destination     Service Provider Request Status Selected Services Address Phone Fax Patient Preferred    [facility]  Pending - Request Sent N/A [address] [facility] [address] [phone] --    [facility]  Pending - Request Sent N/A [address] [facility] [address] [phone] --              Expected Discharge Date and Time     Expected Discharge Date Expected Discharge Time    Nov 15, 2021          Demographic Summary    No documentation.                Functional Status    No documentation.                Psychosocial    No documentation.                Abuse/Neglect    No documentation.                Legal    No documentation.                Substance Abuse    No documentation.                Patient Forms    No documentation.                   Janice Bauer, RN

## 2021-11-12 NOTE — PROGRESS NOTES
"Daily progress note    Chief complaint  Doing better  No new complaints  Denies any chest pain shortness of breath palpitation    History of present illness  71-year-old white male with no medical problems and no home medication brought to the emergency room by the family with generalized weakness for 1 month failure to thrive not eating drinking for several days to week.  Patient has no fever cough chest pain shortness of breath abdominal pain nausea vomiting diarrhea.  Patient looks cachectic and work-up in ER revealed acute kidney injury with severe dehydration and hypernatremia also found to have UTI admit for management.  Patient remained fully awake alert and oriented x3.     REVIEW OF SYSTEMS  Unremarkable except generalized weakness     PHYSICAL EXAM   Blood pressure 109/76, pulse 113, temperature 97.5 °F (36.4 °C), temperature source Oral, resp. rate 18, height 177.8 cm (70\"), weight 65.5 kg (144 lb 6.4 oz), SpO2 95 %.    GENERAL: Awake and alert, acutely ill-appearing cachectic, not distressed  HEENT:  Unremarkable  CV: regular rhythm, tachycardic rate, no murmur  RESPIRATORY: normal effort, CTA  ABDOMEN: soft, nontender bowel sounds positive  MUSCULOSKELETAL: no deformity, FROM, no calf swelling or tenderness  NEURO: alert, slurred speech, moves all extremities, follows commands  SKIN: warm, dry     LAB RESULTS  Lab Results (last 24 hours)     Procedure Component Value Units Date/Time    CBC & Differential [[id number]]  (Abnormal) Collected: 11/12/21 0541    Specimen: Blood Updated: 11/12/21 8927    Narrative:      The following orders were created for panel order CBC & Differential.  Procedure                               Abnormality         Status                     ---------                               -----------         ------                     CBC Auto Differential[[id number]]        Abnormal            Final result                 Please view results for these tests on the individual orders.    " CBC Auto Differential [[id number]]  (Abnormal) Collected: 11/12/21 0541    Specimen: Blood Updated: 11/12/21 0757     WBC 7.45 10*3/mm3      RBC 4.43 10*6/mm3      Hemoglobin 14.2 g/dL      Hematocrit 40.4 %      MCV 91.2 fL      MCH 32.1 pg      MCHC 35.1 g/dL      RDW 14.3 %      RDW-SD 47.1 fl      MPV 12.7 fL      Platelets 57 10*3/mm3      Neutrophil % 61.6 %      Lymphocyte % 24.0 %      Monocyte % 10.1 %      Eosinophil % 2.8 %      Basophil % 0.4 %      Immature Grans % 1.1 %      Neutrophils, Absolute 4.59 10*3/mm3      Lymphocytes, Absolute 1.79 10*3/mm3      Monocytes, Absolute 0.75 10*3/mm3      Eosinophils, Absolute 0.21 10*3/mm3      Basophils, Absolute 0.03 10*3/mm3      Immature Grans, Absolute 0.08 10*3/mm3      nRBC 0.0 /100 WBC     Comprehensive Metabolic Panel [[id number]]  (Abnormal) Collected: 11/12/21 0541    Specimen: Blood Updated: 11/12/21 0743     Glucose 71 mg/dL      BUN 40 mg/dL      Creatinine 1.44 mg/dL      Sodium 144 mmol/L      Potassium 4.1 mmol/L      Comment: Slight hemolysis detected by analyzer. Results may be affected.        Chloride 115 mmol/L      CO2 20.9 mmol/L      Calcium 8.4 mg/dL      Total Protein 5.1 g/dL      Albumin 2.70 g/dL      ALT (SGPT) 70 U/L      AST (SGOT) 87 U/L      Comment: Slight hemolysis detected by analyzer. Results may be affected.        Alkaline Phosphatase 72 U/L      Total Bilirubin 0.6 mg/dL      eGFR Non African Amer 48 mL/min/1.73      Globulin 2.4 gm/dL      A/G Ratio 1.1 g/dL      BUN/Creatinine Ratio 27.8     Anion Gap 8.1 mmol/L     Narrative:      GFR Normal >60  Chronic Kidney Disease <60  Kidney Failure <15      Uric Acid [[id number]]  (Normal) Collected: 11/12/21 0541    Specimen: Blood Updated: 11/12/21 0742     Uric Acid 6.2 mg/dL     T4, Free [[id number]]  (Normal) Collected: 11/12/21 0541    Specimen: Blood Updated: 11/12/21 0740     Free T4 0.95 ng/dL     Narrative:      Results may be falsely increased if patient taking Biotin.       Blood Culture - Blood, Arm, Left [[id number]]  (Normal) Collected: 11/08/21 1611    Specimen: Blood from Arm, Left Updated: 11/11/21 1645     Blood Culture No growth at 3 days    Blood Culture - Blood, Arm, Right [[id number]]  (Normal) Collected: 11/08/21 1523    Specimen: Blood from Arm, Right Updated: 11/11/21 1545     Blood Culture No growth at 3 days        Imaging Results (Last 24 Hours)     ** No results found for the last 24 hours. **                 ECG 12 Lead  Component   Ref Range & Units 11/8/21 1546   QT Interval   ms 351              HEART RATE= 107  bpm  RR Interval= 560  ms  NV Interval= 129  ms  P Horizontal Axis= -10  deg  P Front Axis= 78  deg  QRSD Interval= 71  ms  QT Interval= 351  ms  QRS Axis= -60  deg  T Wave Axis= 57  deg  - ABNORMAL ECG -  Sinus tachycardia  Biatrial enlargement  LAD, consider left anterior fascicular block  ST elevation, consider inferior injury  NO PRIOR TRACING AVAILABLE FOR COMPARISON             Current Facility-Administered Medications:   •  allopurinol (ZYLOPRIM) tablet 100 mg, 100 mg, Oral, Daily, Fernando Ga MD, 100 mg at 11/12/21 1041  •  aspirin chewable tablet 81 mg, 81 mg, Oral, Daily, Fernando Ga MD, 81 mg at 11/11/21 1744  •  cefTRIAXone (ROCEPHIN) 1 g in sodium chloride 0.9 % 100 mL IVPB-VTB, 1 g, Intravenous, Q24H, Fernando Ga MD, Last Rate: 200 mL/hr at 11/11/21 1744, 1 g at 11/11/21 1744  •  famotidine (PEPCID) tablet 20 mg, 20 mg, Oral, BID AC, Fernando Ga MD, 20 mg at 11/12/21 0634  •  megestrol (MEGACE) 40 MG/ML suspension 400 mg, 400 mg, Oral, Daily, Fernando Ga MD, 400 mg at 11/12/21 1041  •  mirtazapine (REMERON) tablet 15 mg, 15 mg, Oral, Nightly, Fernando Ga MD, 15 mg at 11/11/21 2059  •  sodium bicarbonate tablet 650 mg, 650 mg, Oral, 4x Daily, Brian Farrar MD, 650 mg at 11/12/21 1513  •  tamsulosin (FLOMAX) 24 hr capsule 0.4 mg, 0.4 mg, Oral, Daily, Rambo Nicholas MD, 0.4 mg at 11/12/21 1041  •  vitamin D  (ERGOCALCIFEROL) capsule 50,000 Units, 50,000 Units, Oral, Q7 Days, Brian Farrar MD, 50,000 Units at 11/11/21 1744     ASSESSMENT  Acute kidney injury resolving  Acute UTI with sepsis  Severe dehydration  Bilateral hydronephrosis  Hypernatremia  Hyperkalemia  Elevated troponin with no chest pain  Elevated blood sugar with no history of diabetes  Failure to thrive  Gastroesophageal reflux disease    PLAN  CPM  Discontinue IVF  Continue IV antibiotics   Flomax  Urology consult appreciated  Nephrology to follow patient  Stress ulcer DVT prophylaxis   Supportive care  PT/OT  Discussed with nursing staff  Discharge planning    STEVEN HERMAN MD

## 2021-11-12 NOTE — PROGRESS NOTES
"   LOS: 4 days     Chief Complaint/ Reason for encounter: Acute kidney injury, severe hypernatremia  Chief Complaint   Patient presents with   • Weakness - Generalized         Subjective   Feels better today, eating and drinking little better  Denies any shortness of breath or chest pain  No fevers or chills  Improved appetite with no nausea or vomiting  No edema, improved urine output. Urine very dark, cola colored per RN    11/11 continues to improve.  Much more awake and alert.  Denies any fevers chills nausea vomiting shortness of breath chest pain or edema  White placed yesterday with over 2 L urine output, urology following and added Flomax    11/12 doing little better today.  More awake and alert, appetite slowly improved  White in place with plans to perform a voiding trial today      Medical history reviewed:  History of Present Illness    Subjective    History taken from: Patient and chart    Vital Signs  Temp:  [97.5 °F (36.4 °C)-98.9 °F (37.2 °C)] 97.5 °F (36.4 °C)  Heart Rate:  [62-92] 62  Resp:  [16] 16  BP: ()/(58-64) 107/63       Wt Readings from Last 1 Encounters:   11/12/21 0535 65.5 kg (144 lb 6.4 oz)   11/11/21 0500 70.5 kg (155 lb 6.8 oz)   11/10/21 0307 65.1 kg (143 lb 8.3 oz)   11/08/21 2115 60.8 kg (134 lb 0.6 oz)   11/08/21 1504 60.3 kg (133 lb)       Objective:  Vital signs: (most recent): Blood pressure 107/63, pulse 62, temperature 97.5 °F (36.4 °C), temperature source Oral, resp. rate 16, height 177.8 cm (70\"), weight 65.5 kg (144 lb 6.4 oz), SpO2 95 %.              Objective:  General Appearance: Somewhat malnourished, comfortable, chronically ill-appearing, in no acute distress and not in pain.  Awake, alert, oriented  HEENT: Mucous membranes moist, no injury, oropharynx clear  Lungs:  Normal effort and normal respiratory rate.  Breath sounds clear to auscultation.  No  respiratory distress.  No rales, decreased breath sounds or rhonchi.    Heart: Normal rate.  Regular rhythm.  " S1 normal.  No murmur.   Abdomen: Abdomen is soft.  Bowel sounds are normal, no abdominal tenderness.  There is no rebound or guarding  Extremities: Normal range of motion. No significant edema of bilateral lower extremities, distal pulses intact  Neurological: No focal motor or sensory deficits, pupils reactive  Skin:  Warm and dry.  No rash or cyanosis.       Results Review:    Intake/Output:     Intake/Output Summary (Last 24 hours) at 11/12/2021 0855  Last data filed at 11/12/2021 0200  Gross per 24 hour   Intake 1783.33 ml   Output 950 ml   Net 833.33 ml         DATA:  Radiology and Labs:  The following labs independently reviewed by me. Additional labs ordered for tomorrow a.m.  Interval notes, chart personally reviewed by me.   Old records independently reviewed showing no prior known history of chronic kidney disease  The following radiologic studies independently viewed by me, findings renal ultrasound shows marked right hydronephrosis, moderate left hydronephrosis and distended urinary bladder, moderately enlarged prostate  New problems include hydronephrosis  Discussed with patient and his nurse at bedside    Risk/ complexity of medical care/ medical decision making moderate risk    Labs:   Recent Results (from the past 24 hour(s))   T4, Free    Collection Time: 11/12/21  5:41 AM    Specimen: Blood   Result Value Ref Range    Free T4 0.95 0.93 - 1.70 ng/dL   Uric Acid    Collection Time: 11/12/21  5:41 AM    Specimen: Blood   Result Value Ref Range    Uric Acid 6.2 3.4 - 7.0 mg/dL   Comprehensive Metabolic Panel    Collection Time: 11/12/21  5:41 AM    Specimen: Blood   Result Value Ref Range    Glucose 71 65 - 99 mg/dL    BUN 40 (H) 8 - 23 mg/dL    Creatinine 1.44 (H) 0.76 - 1.27 mg/dL    Sodium 144 136 - 145 mmol/L    Potassium 4.1 3.5 - 5.2 mmol/L    Chloride 115 (H) 98 - 107 mmol/L    CO2 20.9 (L) 22.0 - 29.0 mmol/L    Calcium 8.4 (L) 8.6 - 10.5 mg/dL    Total Protein 5.1 (L) 6.0 - 8.5 g/dL    Albumin  2.70 (L) 3.50 - 5.20 g/dL    ALT (SGPT) 70 (H) 1 - 41 U/L    AST (SGOT) 87 (H) 1 - 40 U/L    Alkaline Phosphatase 72 39 - 117 U/L    Total Bilirubin 0.6 0.0 - 1.2 mg/dL    eGFR Non African Amer 48 (L) >60 mL/min/1.73    Globulin 2.4 gm/dL    A/G Ratio 1.1 g/dL    BUN/Creatinine Ratio 27.8 (H) 7.0 - 25.0    Anion Gap 8.1 5.0 - 15.0 mmol/L   CBC Auto Differential    Collection Time: 11/12/21  5:41 AM    Specimen: Blood   Result Value Ref Range    WBC 7.45 3.40 - 10.80 10*3/mm3    RBC 4.43 4.14 - 5.80 10*6/mm3    Hemoglobin 14.2 13.0 - 17.7 g/dL    Hematocrit 40.4 37.5 - 51.0 %    MCV 91.2 79.0 - 97.0 fL    MCH 32.1 26.6 - 33.0 pg    MCHC 35.1 31.5 - 35.7 g/dL    RDW 14.3 12.3 - 15.4 %    RDW-SD 47.1 37.0 - 54.0 fl    MPV 12.7 (H) 6.0 - 12.0 fL    Platelets 57 (L) 140 - 450 10*3/mm3    Neutrophil % 61.6 42.7 - 76.0 %    Lymphocyte % 24.0 19.6 - 45.3 %    Monocyte % 10.1 5.0 - 12.0 %    Eosinophil % 2.8 0.3 - 6.2 %    Basophil % 0.4 0.0 - 1.5 %    Immature Grans % 1.1 (H) 0.0 - 0.5 %    Neutrophils, Absolute 4.59 1.70 - 7.00 10*3/mm3    Lymphocytes, Absolute 1.79 0.70 - 3.10 10*3/mm3    Monocytes, Absolute 0.75 0.10 - 0.90 10*3/mm3    Eosinophils, Absolute 0.21 0.00 - 0.40 10*3/mm3    Basophils, Absolute 0.03 0.00 - 0.20 10*3/mm3    Immature Grans, Absolute 0.08 (H) 0.00 - 0.05 10*3/mm3    nRBC 0.0 0.0 - 0.2 /100 WBC       Radiology:  Imaging Results (Last 24 Hours)     ** No results found for the last 24 hours. **             Medications have been reviewed:  Current Facility-Administered Medications   Medication Dose Route Frequency Provider Last Rate Last Admin   • allopurinol (ZYLOPRIM) tablet 100 mg  100 mg Oral Daily Fernando Ga MD   100 mg at 11/11/21 1009   • aspirin chewable tablet 81 mg  81 mg Oral Daily Fernando Ga MD   81 mg at 11/11/21 1744   • cefTRIAXone (ROCEPHIN) 1 g in sodium chloride 0.9 % 100 mL IVPB-VTB  1 g Intravenous Q24H Fernando Ga  mL/hr at 11/11/21 1744 1 g at 11/11/21 1744   •  famotidine (PEPCID) tablet 20 mg  20 mg Oral BID AC Fernando Ga MD   20 mg at 11/12/21 0634   • megestrol (MEGACE) 40 MG/ML suspension 400 mg  400 mg Oral Daily Fernando Ga MD   400 mg at 11/11/21 1009   • mirtazapine (REMERON) tablet 15 mg  15 mg Oral Nightly Fernando Ga MD   15 mg at 11/11/21 2059   • sodium bicarbonate tablet 650 mg  650 mg Oral 4x Daily Brian Farrar MD       • tamsulosin (FLOMAX) 24 hr capsule 0.4 mg  0.4 mg Oral Daily Rambo Nicholas MD   0.4 mg at 11/11/21 1009   • vitamin D (ERGOCALCIFEROL) capsule 50,000 Units  50,000 Units Oral Q7 Days Brian Farrar MD   50,000 Units at 11/11/21 1744       ASSESSMENT:  severe renal failure secondary to profound dehydration, improved with fluids  Profound hypernatremia, still high but much improved  Lactic acidosis from hypotension  Hypotension related to severe dehydration, mildly better  Elevated LFTs  Hypercalcemia secondary to dehydration  Elevated hemoglobin related to hemoconcentration/dehydration  Cachexia and malnutrition  History of autism and cognitive deficits  Hyperphosphatemia related to renal failure  Mild CK elevation, possibly some element of mild rhabdo  New, vitamin D deficiency      PLAN:  Renal function, volume and electrolytes continue to improve  Still very dark urine, likely has some component of ATN will be slow to resolve  Stop IV fluids and encourage oral fluid intake today  Add oral sodium bicarbonate  Voiding trial today per urology.  On Flomax  Continue oral ergocalciferol    IV Rocephin for UTI  nutrition following to assist with increased daily caloric intake/nutritional needs, also on Megace    Continue to monitor electrolytes and volume closely, avoid IV contrast and nephrotoxic medications   Follow-up a.m. labs      Brian Farrar MD   [facility]   Office phone number: [phone]  Answering service phone number: [phone]    11/12/21  08:55 EST    Dictation performed using  Dragon dictation software

## 2021-11-12 NOTE — THERAPY TREATMENT NOTE
Patient Name: Pedro Peck  : 1950    MRN: [id number]                              Today's Date: 2021       Admit Date: 2021    Visit Dx:     ICD-10-CM ICD-9-CM   1. DREECK (acute kidney injury) (HCC)  N17.9 584.9   2. Hypotension, unspecified hypotension type  I95.9 458.9   3. Sepsis, due to unspecified organism, unspecified whether acute organ dysfunction present (HCC)  A41.9 038.9     995.91   4. Hypernatremia  E87.0 276.0   5. Lactic acidosis  E87.2 276.2   6. Elevated troponin  R77.8 790.6     Patient Active Problem List   Diagnosis   • DERECK (acute kidney injury) (HCC)   • Severe malnutrition (CMS/HCC)   • Vitamin D deficiency     Past Medical History:   Diagnosis Date   • Autism      History reviewed. No pertinent surgical history.   General Information     Row Name 21 [facility] [place] 2[place]          OT Time and Intention    Document Type therapy note (daily note)  -MW     Mode of Treatment occupational therapy; physical therapy; co-treatment  -     Row Name 21 120          General Information    Patient Profile Reviewed yes  -MW     Existing Precautions/Restrictions fall  -     Row Name 21 120          Cognition    Orientation Status (Cognition) oriented to; person; place; time  -     Row Name 21 120          Safety Issues, Functional Mobility    Impairments Affecting Function (Mobility) balance; cognition; coordination; endurance/activity tolerance; strength; postural/trunk control; motor control; motor planning  -MW     Comment, Safety Issues/Impairments (Mobility) non skid socks and gait belt donned for safety  -           User Key  (r) = Recorded By, (t) = Taken By, (c) = Cosigned By    Initials Name Provider Type    MW Violet Massey OT Occupational Therapist                 Mobility/ADL's     Row Name 21          Bed Mobility    Comment (Bed Mobility) UIC, not tested  -     Row Name 21          Transfers    Comment (Transfers) Pt performed x2  STS with CGA-min A x2 with Rwx  -     Sit-Stand Christian (Transfers) minimum assist (75% patient effort); verbal cues; nonverbal cues (demo/gesture); contact guard  -     Row Name 11/12/21 1209          Sit-Stand Transfer    Assistive Device (Sit-Stand Transfers) walker, front-wheeled  -     Row Name 11/12/21 1209          Functional Mobility    Functional Mobility- Ind. Level minimum assist (75% patient effort); 2 person assist required  -     Functional Mobility- Device rolling walker  -     Functional Mobility- Comment Patient completed func mob into hallway for increaed distance this date with min A x2 using Rwx. Mod-max vc's for progression of Rwx throughout mobility task  -[facility] Name 11/12/21 1209          Activities of Daily Living    BADL Assessment/Intervention grooming  -[facility] Name 11/12/21 1209          Grooming Assessment/Training    Christian Level (Grooming) wash face, hands; set up; verbal cues  -     Position (Grooming) supported sitting  -     Comment (Grooming) UIC  -           User Key  (r) = Recorded By, (t) = Taken By, (c) = Cosigned By    Initials Name Provider Type     Violet Massey OT Occupational Therapist               Obj/Interventions     [facility] Name 11/12/21 1211          Shoulder (Therapeutic Exercise)    Shoulder (Therapeutic Exercise) AROM (active range of motion)  -     Shoulder AROM (Therapeutic Exercise) bilateral; flexion; extension; horizontal aBduction/aDduction; 10 repetitions; 2 sets  -[facility] Name 11/12/21 1211          Elbow/Forearm (Therapeutic Exercise)    Elbow/Forearm (Therapeutic Exercise) AROM (active range of motion)  -     Elbow/Forearm AROM (Therapeutic Exercise) bilateral; flexion; extension; 10 repetitions; 2 sets  -[facility] Name 11/12/21 1211          Balance    Balance Assessment sitting static balance; sitting dynamic balance; sit to stand dynamic balance; standing static balance; standing dynamic balance  -      Static Sitting Balance WFL; sitting in chair; supported  -MW     Dynamic Sitting Balance WFL; supported; sitting in chair  -MW     Sit to Stand Dynamic Balance WFL; supported; standing  -MW     Static Standing Balance mild impairment; moderate impairment; supported; standing  -MW     Dynamic Standing Balance moderate impairment; supported; standing  -MW     Balance Interventions sitting; standing; sit to stand; supported; static; dynamic; occupation based/functional task; weight shifting activity  -MW     Comment, Balance impulsive at times with mobility, reaching to get object on floor, min A x2 for upright standing balance due to unsteadiness and decreased awareness  -MW     Row Name 11/12/21 1211          Therapeutic Exercise    Therapeutic Exercise elbow/forearm; shoulder  -MW           User Key  (r) = Recorded By, (t) = Taken By, (c) = Cosigned By    Initials Name Provider Type    Violet Waldron OT Occupational Therapist               Goals/Plan    No documentation.                Clinical Impression     Row Name 11/12/21 1212          Pain Assessment    Additional Documentation Pain Scale: Numbers Pre/Post-Treatment (Group)  -MW     Row Name 11/12/21 1212          Pain Scale: Numbers Pre/Post-Treatment    Pretreatment Pain Rating 2/10  -MW     Posttreatment Pain Rating 2/10  -MW     Pre/Posttreatment Pain Comment RN notified patient requesting tylenol  -MW     Row Name 11/12/21 1212          Plan of Care Review    Plan of Care Reviewed With patient  -MW     Progress improving  -MW     Outcome Summary Patient seen for OT/PT session this date. Patient UIC upon arrival, completing x2 STS this date with CGA x2. Impulsivitiy noted at times throughout. Patient completed upright mobility with min A x2 using Rwx with mod-max vc's for progression and safe use of AD. Patient completed g/h task with s/up in chair supported. BUE ther ex in functional planes completed 2 x10 to increased UB strength required for  ADLs/functional transfers. Continue as pt tolerates to maximize rehab potential while in acute stay.  -MW     Row Name 11/12/21 1212          Vital Signs    O2 Delivery Pre Treatment room air  -MW     O2 Delivery Intra Treatment room air  -MW     O2 Delivery Post Treatment room air  -MW     Pre Patient Position Sitting  -MW     Intra Patient Position Standing  -MW     Post Patient Position Sitting  -MW     Row Name 11/12/21 1212          Positioning and Restraints    Pre-Treatment Position sitting in chair/recliner  -MW     Post Treatment Position chair  -MW     In Chair notified nsg; reclined; call light within reach; encouraged to call for assist; exit alarm on  -MW           User Key  (r) = Recorded By, (t) = Taken By, (c) = Cosigned By    Initials Name Provider Type    Violet Waldron OT Occupational Therapist               Outcome Measures     Row Name 11/12/21 1216          How much help from another is currently needed...    Putting on and taking off regular lower body clothing? 3  -MW     Bathing (including washing, rinsing, and drying) 2  -MW     Toileting (which includes using toilet bed pan or urinal) 2  -MW     Putting on and taking off regular upper body clothing 3  -MW     Taking care of personal grooming (such as brushing teeth) 3  -MW     Eating meals 3  -MW     AM-PAC 6 Clicks Score (OT) 16  -MW     Row Name 11/12/21 1216          Functional Assessment    Outcome Measure Options AM-PAC 6 Clicks Daily Activity (OT)  -MW           User Key  (r) = Recorded By, (t) = Taken By, (c) = Cosigned By    Initials Name Provider Type    Violet Waldrno OT Occupational Therapist                Occupational Therapy Education                 Title: PT OT SLP Therapies (In Progress)     Topic: Occupational Therapy (In Progress)     Point: ADL training (Done)     Description:   Instruct learner(s) on proper safety adaptation and remediation techniques during self care or transfers.   Instruct in proper  use of assistive devices.              Learning Progress Summary           Patient Acceptance, E, VU by BL at 11/10/2021 2663    Comment: The role of OT                   Point: Home exercise program (Not Started)     Description:   Instruct learner(s) on appropriate technique for monitoring, assisting and/or progressing therapeutic exercises/activities.              Learner Progress:  Not documented in this visit.          Point: Precautions (Not Started)     Description:   Instruct learner(s) on prescribed precautions during self-care and functional transfers.              Learner Progress:  Not documented in this visit.          Point: Body mechanics (Not Started)     Description:   Instruct learner(s) on proper positioning and spine alignment during self-care, functional mobility activities and/or exercises.              Learner Progress:  Not documented in this visit.                      User Key     Initials Effective Dates Name Provider Type Discipline     01/05/21 -  Carli Lemus OT Occupational Therapist OT              OT Recommendation and Plan     Plan of Care Review  Plan of Care Reviewed With: patient  Progress: improving  Outcome Summary: Patient seen for OT/PT session this date. Patient UIC upon arrival, completing x2 STS this date with CGA x2. Impulsivitiy noted at times throughout. Patient completed upright mobility with min A x2 using Rwx with mod-max vc's for progression and safe use of AD. Patient completed g/h task with s/up in chair supported. BUE ther ex in functional planes completed 2 x10 to increased UB strength required for ADLs/functional transfers. Continue as pt tolerates to maximize rehab potential while in acute stay.     Time Calculation:    Time Calculation- OT     Row Name 11/12/21 1217             Time Calculation- OT    OT Start Time 1041  -MW      OT Stop Time 1104  -MW      OT Time Calculation (min) 23 min  -MW      Total Timed Code Minutes- OT 23 minute(s)  -MW       OT Received On 11/12/21  -MW      OT - Next Appointment 11/15/21  -MW              Timed Charges    92605 - OT Therapeutic Exercise Minutes 10  -MW      75766 - OT Therapeutic Activity Minutes 13  -MW              Total Minutes    Timed Charges Total Minutes 23  -MW       Total Minutes 23  -MW            User Key  (r) = Recorded By, (t) = Taken By, (c) = Cosigned By    Initials Name Provider Type     Violet Massey OT Occupational Therapist              Therapy Charges for Today     Code Description Service Date Service Provider Modifiers Qty    61412493078  OT THER PROC EA 15 MIN 11/12/2021 Violet Massey OT GO 1    [id number]  OT THERAPEUTIC ACT EA 15 MIN 11/12/2021 Violet Massey OT GO 1               Violet Massey OT  11/12/2021

## 2021-11-12 NOTE — NURSING NOTE
Spoke with Dr. Nicholas, urology re: pt not voiding aftr attempts , new order recd to cath pt in and out, no indwelling f/c. In and out acth perofrmed, used red #[id number] r/t blood clots clogging cath, obtained  500ml out light tea colored urine, pressure releived per pt.

## 2021-11-13 PROBLEM — E83.39 HYPOPHOSPHATEMIA: Status: ACTIVE | Noted: 2021-11-13

## 2021-11-13 LAB
ALBUMIN SERPL-MCNC: 2.5 G/DL (ref 3.5–5.2)
ANION GAP SERPL CALCULATED.3IONS-SCNC: 7 MMOL/L (ref 5–15)
BACTERIA SPEC AEROBE CULT: NORMAL
BACTERIA SPEC AEROBE CULT: NORMAL
BASOPHILS # BLD AUTO: 0.03 10*3/MM3 (ref 0–0.2)
BASOPHILS NFR BLD AUTO: 0.4 % (ref 0–1.5)
BUN SERPL-MCNC: 28 MG/DL (ref 8–23)
BUN/CREAT SERPL: 20.4 (ref 7–25)
CALCIUM SPEC-SCNC: 8.4 MG/DL (ref 8.6–10.5)
CHLORIDE SERPL-SCNC: 109 MMOL/L (ref 98–107)
CO2 SERPL-SCNC: 24 MMOL/L (ref 22–29)
CREAT SERPL-MCNC: 1.37 MG/DL (ref 0.76–1.27)
DEPRECATED RDW RBC AUTO: 49.2 FL (ref 37–54)
EOSINOPHIL # BLD AUTO: 0.2 10*3/MM3 (ref 0–0.4)
EOSINOPHIL NFR BLD AUTO: 2.4 % (ref 0.3–6.2)
ERYTHROCYTE [DISTWIDTH] IN BLOOD BY AUTOMATED COUNT: 14.2 % (ref 12.3–15.4)
GFR SERPL CREATININE-BSD FRML MDRD: 51 ML/MIN/1.73
GLUCOSE SERPL-MCNC: 82 MG/DL (ref 65–99)
HCT VFR BLD AUTO: 44.5 % (ref 37.5–51)
HGB BLD-MCNC: 14.6 G/DL (ref 13–17.7)
IMM GRANULOCYTES # BLD AUTO: 0.06 10*3/MM3 (ref 0–0.05)
IMM GRANULOCYTES NFR BLD AUTO: 0.7 % (ref 0–0.5)
LYMPHOCYTES # BLD AUTO: 1.65 10*3/MM3 (ref 0.7–3.1)
LYMPHOCYTES NFR BLD AUTO: 19.7 % (ref 19.6–45.3)
MCH RBC QN AUTO: 31 PG (ref 26.6–33)
MCHC RBC AUTO-ENTMCNC: 32.8 G/DL (ref 31.5–35.7)
MCV RBC AUTO: 94.5 FL (ref 79–97)
MONOCYTES # BLD AUTO: 0.75 10*3/MM3 (ref 0.1–0.9)
MONOCYTES NFR BLD AUTO: 9 % (ref 5–12)
NEUTROPHILS NFR BLD AUTO: 5.68 10*3/MM3 (ref 1.7–7)
NEUTROPHILS NFR BLD AUTO: 67.8 % (ref 42.7–76)
NRBC BLD AUTO-RTO: 0 /100 WBC (ref 0–0.2)
PHOSPHATE SERPL-MCNC: 1.7 MG/DL (ref 2.5–4.5)
PLATELET # BLD AUTO: 67 10*3/MM3 (ref 140–450)
PMV BLD AUTO: 12.4 FL (ref 6–12)
POTASSIUM SERPL-SCNC: 4 MMOL/L (ref 3.5–5.2)
RBC # BLD AUTO: 4.71 10*6/MM3 (ref 4.14–5.8)
SODIUM SERPL-SCNC: 140 MMOL/L (ref 136–145)
WBC # BLD AUTO: 8.37 10*3/MM3 (ref 3.4–10.8)

## 2021-11-13 PROCEDURE — 85025 COMPLETE CBC W/AUTO DIFF WBC: CPT | Performed by: HOSPITALIST

## 2021-11-13 PROCEDURE — 25010000002 CEFTRIAXONE PER 250 MG: Performed by: HOSPITALIST

## 2021-11-13 PROCEDURE — 80069 RENAL FUNCTION PANEL: CPT | Performed by: INTERNAL MEDICINE

## 2021-11-13 RX ADMIN — FAMOTIDINE 20 MG: 20 TABLET, FILM COATED ORAL at 18:36

## 2021-11-13 RX ADMIN — ASPIRIN 81 MG: 81 TABLET, CHEWABLE ORAL at 18:36

## 2021-11-13 RX ADMIN — METOPROLOL TARTRATE 12.5 MG: 25 TABLET, FILM COATED ORAL at 08:46

## 2021-11-13 RX ADMIN — SODIUM BICARBONATE 650 MG: 650 TABLET ORAL at 08:46

## 2021-11-13 RX ADMIN — TAMSULOSIN HYDROCHLORIDE 0.4 MG: 0.4 CAPSULE ORAL at 08:46

## 2021-11-13 RX ADMIN — FAMOTIDINE 20 MG: 20 TABLET, FILM COATED ORAL at 08:46

## 2021-11-13 RX ADMIN — Medication 2 PACKET: at 14:43

## 2021-11-13 RX ADMIN — CEFTRIAXONE 1 G: 1 INJECTION, POWDER, FOR SOLUTION INTRAMUSCULAR; INTRAVENOUS at 18:36

## 2021-11-13 RX ADMIN — MEGESTROL ACETATE 400 MG: 40 SUSPENSION ORAL at 08:46

## 2021-11-13 RX ADMIN — Medication 2 PACKET: at 20:53

## 2021-11-13 RX ADMIN — MIRTAZAPINE 15 MG: 15 TABLET, FILM COATED ORAL at 20:54

## 2021-11-13 RX ADMIN — METOPROLOL TARTRATE 12.5 MG: 25 TABLET, FILM COATED ORAL at 20:54

## 2021-11-13 RX ADMIN — ALLOPURINOL 100 MG: 100 TABLET ORAL at 08:46

## 2021-11-13 NOTE — PLAN OF CARE
Goal Outcome Evaluation:         Pt had no complaints of pain. Pt medicated per orders. Pt was able to void on his own once. Bladder scan was obtained in the AM. Pt had over 500ml in bladder. Straight cath performed. Pt tolerated well. Pt vital signs are within normal limits at this time.

## 2021-11-13 NOTE — PROGRESS NOTES
Adult Nutrition  Assessment/PES    Patient Name:  Pedro Peck  YOB: 1950  MRN: [id number]  Admit Date:  11/8/2021    Assessment Date:  11/13/2021    Comments:  Calorie Cound day 2 pt ate 100% of 3 meals and drank Boost plus supplements. Pt meeting nutritional needs.      Reason for Assessment     Row Name 11/13/21 1316          Reason for Assessment    Reason For Assessment calorie count order                Nutrition/Diet History     Row Name 11/13/21 1318          Nutrition/Diet History    Typical Food/Fluid Intake ate 100% of 3 meals on 11/12, plus boost plus                        Nutrition Prescription Ordered     Row Name 11/13/21 1319          Nutrition Prescription PO    Current PO Diet Soft Texture     Texture Chopped     Fluid Consistency Thin     Supplement Boost Plus (Ensure Enlive, Ensure Plus)                       Problem/Interventions:           Intervention Goal     Row Name 11/13/21 1319          Intervention Goal    General Maintain nutrition; Disease management/therapy; Meet nutritional needs for age/condition     PO Maintain intake     Weight Appropriate weight gain                Nutrition Intervention     Row Name 11/13/21 1319          Nutrition Intervention    RD/Tech Action Follow Tx progress; Care plan reviewd; Supplement provided                  Education/Evaluation     Row Name 11/13/21 1319          Monitor/Evaluation    Monitor Per protocol; PO intake; Supplement intake                 Electronically signed by:  Sydney Page RD  11/13/21 13:19 EST

## 2021-11-13 NOTE — PROGRESS NOTES
"   LOS: 5 days     Chief Complaint/ Reason for encounter: Acute kidney injury, severe hypernatremia  Chief Complaint   Patient presents with   • Weakness - Generalized         Subjective   Feels better today, eating and drinking little better  Denies any shortness of breath or chest pain  No fevers or chills  Improved appetite with no nausea or vomiting  No edema, improved urine output. Urine very dark, cola colored per RN    11/11 continues to improve.  Much more awake and alert.  Denies any fevers chills nausea vomiting shortness of breath chest pain or edema  White placed yesterday with over 2 L urine output, urology following and added Flomax    11/12 doing little better today.  More awake and alert, appetite slowly improved  White in place with plans to perform a voiding trial today    11/13 no new complaints, in good spirits.  Eating and drinking well, still not getting out of bed or moving around much.  Denies shortness of breath chest pain fevers chills nausea or vomiting      Medical history reviewed:  Weakness - Generalized        Subjective    History taken from: Patient and chart    Vital Signs  Temp:  [98.3 °F (36.8 °C)-98.9 °F (37.2 °C)] 98.3 °F (36.8 °C)  Heart Rate:  [] 93  Resp:  [16-18] 16  BP: ()/(58-83) 119/83       Wt Readings from Last 1 Encounters:   11/12/21 0535 65.5 kg (144 lb 6.4 oz)   11/11/21 0500 70.5 kg (155 lb 6.8 oz)   11/10/21 0307 65.1 kg (143 lb 8.3 oz)   11/08/21 2115 60.8 kg (134 lb 0.6 oz)   11/08/21 1504 60.3 kg (133 lb)       Objective:  Vital signs: (most recent): Blood pressure 119/83, pulse 93, temperature 98.3 °F (36.8 °C), temperature source Oral, resp. rate 16, height 177.8 cm (70\"), weight 65.5 kg (144 lb 6.4 oz), SpO2 96 %.              Objective:  General Appearance: Somewhat malnourished, comfortable, chronically ill-appearing, in no acute distress and not in pain.  Awake, alert, oriented  HEENT: Mucous membranes moist, no injury, oropharynx " clear  Lungs:  Normal effort and normal respiratory rate.  Breath sounds clear to auscultation.  No  respiratory distress.  No rales, decreased breath sounds or rhonchi.    Heart: Normal rate.  Regular rhythm.  S1 normal.  No murmur.   Abdomen: Abdomen is soft.  Bowel sounds are normal, no abdominal tenderness.  There is no rebound or guarding  Extremities: Normal range of motion. No significant edema of bilateral lower extremities, distal pulses intact  Neurological: No focal motor or sensory deficits, pupils reactive  Skin:  Warm and dry.  No rash or cyanosis.       Results Review:    Intake/Output:     Intake/Output Summary (Last 24 hours) at 11/13/2021 1017  Last data filed at 11/13/2021 0944  Gross per 24 hour   Intake 820 ml   Output 1150 ml   Net -330 ml         DATA:  Radiology and Labs:  The following labs independently reviewed by me. Additional labs ordered for tomorrow a.m.  Interval notes, chart personally reviewed by me.   Old records independently reviewed showing no prior known history of chronic kidney disease  The following radiologic studies independently viewed by me, findings renal ultrasound shows marked right hydronephrosis, moderate left hydronephrosis and distended urinary bladder, moderately enlarged prostate  New problems include hydronephrosis  Discussed with patient and his nurse at bedside    Risk/ complexity of medical care/ medical decision making moderate risk    Labs:   Recent Results (from the past 24 hour(s))   Renal Function Panel    Collection Time: 11/13/21  7:57 AM    Specimen: Blood   Result Value Ref Range    Glucose 82 65 - 99 mg/dL    BUN 28 (H) 8 - 23 mg/dL    Creatinine 1.37 (H) 0.76 - 1.27 mg/dL    Sodium 140 136 - 145 mmol/L    Potassium 4.0 3.5 - 5.2 mmol/L    Chloride 109 (H) 98 - 107 mmol/L    CO2 24.0 22.0 - 29.0 mmol/L    Calcium 8.4 (L) 8.6 - 10.5 mg/dL    Albumin 2.50 (L) 3.50 - 5.20 g/dL    Phosphorus 1.7 (C) 2.5 - 4.5 mg/dL    Anion Gap 7.0 5.0 - 15.0 mmol/L     BUN/Creatinine Ratio 20.4 7.0 - 25.0    eGFR Non African Amer 51 (L) >60 mL/min/1.73   CBC Auto Differential    Collection Time: 11/13/21  7:57 AM    Specimen: Blood   Result Value Ref Range    WBC 8.37 3.40 - 10.80 10*3/mm3    RBC 4.71 4.14 - 5.80 10*6/mm3    Hemoglobin 14.6 13.0 - 17.7 g/dL    Hematocrit 44.5 37.5 - 51.0 %    MCV 94.5 79.0 - 97.0 fL    MCH 31.0 26.6 - 33.0 pg    MCHC 32.8 31.5 - 35.7 g/dL    RDW 14.2 12.3 - 15.4 %    RDW-SD 49.2 37.0 - 54.0 fl    MPV 12.4 (H) 6.0 - 12.0 fL    Platelets 67 (L) 140 - 450 10*3/mm3    Neutrophil % 67.8 42.7 - 76.0 %    Lymphocyte % 19.7 19.6 - 45.3 %    Monocyte % 9.0 5.0 - 12.0 %    Eosinophil % 2.4 0.3 - 6.2 %    Basophil % 0.4 0.0 - 1.5 %    Immature Grans % 0.7 (H) 0.0 - 0.5 %    Neutrophils, Absolute 5.68 1.70 - 7.00 10*3/mm3    Lymphocytes, Absolute 1.65 0.70 - 3.10 10*3/mm3    Monocytes, Absolute 0.75 0.10 - 0.90 10*3/mm3    Eosinophils, Absolute 0.20 0.00 - 0.40 10*3/mm3    Basophils, Absolute 0.03 0.00 - 0.20 10*3/mm3    Immature Grans, Absolute 0.06 (H) 0.00 - 0.05 10*3/mm3    nRBC 0.0 0.0 - 0.2 /100 WBC       Radiology:  Imaging Results (Last 24 Hours)     ** No results found for the last 24 hours. **             Medications have been reviewed:  Current Facility-Administered Medications   Medication Dose Route Frequency Provider Last Rate Last Admin   • acetaminophen (TYLENOL) tablet 650 mg  650 mg Oral Q4H PRN Fernando Ga MD       • allopurinol (ZYLOPRIM) tablet 100 mg  100 mg Oral Daily Fernando Ga MD   100 mg at 11/13/21 0846   • aspirin chewable tablet 81 mg  81 mg Oral Daily Fernando Ga MD   81 mg at 11/12/21 1806   • cefTRIAXone (ROCEPHIN) 1 g in sodium chloride 0.9 % 100 mL IVPB-VTB  1 g Intravenous Q24H Fernando Ga  mL/hr at 11/12/21 1805 1 g at 11/12/21 1805   • famotidine (PEPCID) tablet 20 mg  20 mg Oral BID AC Fernando Ga MD   20 mg at 11/13/21 0846   • megestrol (MEGACE) 40 MG/ML suspension 400 mg  400 mg Oral Daily Sury  MD Fernando   400 mg at 11/13/21 0846   • metoprolol tartrate (LOPRESSOR) tablet 12.5 mg  12.5 mg Oral Q12H Fernando Ga MD   12.5 mg at 11/13/21 0846   • mirtazapine (REMERON) tablet 15 mg  15 mg Oral Nightly Fernando Ga MD   15 mg at 11/12/21 2022   • potassium & sodium phosphates (PHOS-NAK) oral packet  2 packet Oral 4x Daily AC & at Bedtime Brian Farrar MD       • tamsulosin (FLOMAX) 24 hr capsule 0.4 mg  0.4 mg Oral Daily Rambo Nicholas MD   0.4 mg at 11/13/21 0846   • vitamin D (ERGOCALCIFEROL) capsule 50,000 Units  50,000 Units Oral Q7 Days Brian Farrar MD   50,000 Units at 11/11/21 1744       ASSESSMENT:  severe renal failure secondary to profound dehydration, improved with fluids  Profound hypernatremia, still high but much improved  Lactic acidosis from hypotension  Hypotension related to severe dehydration, mildly better  Elevated LFTs  Hypercalcemia secondary to dehydration  Elevated hemoglobin related to hemoconcentration/dehydration  Cachexia and malnutrition  History of autism and cognitive deficits  Hyperphosphatemia related to renal failure  Mild CK elevation, possibly some element of mild rhabdo  New, vitamin D deficiency  New hypophosphatemia    PLAN:  Renal function, volume and electrolytes continue to improve  Continue to encourage oral fluid intake  Acidosis improved.  Stop bicarb  Add oral phosphorus replacement    Voiding trial per urology.  On Flomax, White out, intermittent catheterization is needed  Continue oral ergocalciferol    IV Rocephin for UTI  nutrition following to assist with increased daily caloric intake/nutritional needs, also on Megace    Continue to monitor electrolytes and volume closely, avoid IV contrast and nephrotoxic medications   Follow-up a.m. labs      Brian Farrar MD   [facility]   Office phone number: [phone]  Answering service phone number: [phone]    11/13/21  10:17 EST    Dictation performed using Dragon  dictation software

## 2021-11-13 NOTE — PROGRESS NOTES
"Daily progress note    Chief complaint  Doing better  No new complaints  Denies any chest pain shortness of breath palpitation    History of present illness  71-year-old white male with no medical problems and no home medication brought to the emergency room by the family with generalized weakness for 1 month failure to thrive not eating drinking for several days to week.  Patient has no fever cough chest pain shortness of breath abdominal pain nausea vomiting diarrhea.  Patient looks cachectic and work-up in ER revealed acute kidney injury with severe dehydration and hypernatremia also found to have UTI admit for management.  Patient remained fully awake alert and oriented x3.     REVIEW OF SYSTEMS  Unremarkable except generalized weakness     PHYSICAL EXAM   Blood pressure 102/70, pulse 87, temperature 99.6 °F (37.6 °C), temperature source Oral, resp. rate 16, height 177.8 cm (70\"), weight 65.5 kg (144 lb 6.4 oz), SpO2 97 %.    GENERAL: Awake and alert, acutely ill-appearing cachectic, not distressed  HEENT:  Unremarkable  CV: regular rhythm, tachycardic rate, no murmur  RESPIRATORY: normal effort, CTA  ABDOMEN: soft, nontender bowel sounds positive  MUSCULOSKELETAL: no deformity, FROM, no calf swelling or tenderness  NEURO: alert, slurred speech, moves all extremities, follows commands  SKIN: warm, dry     LAB RESULTS  Lab Results (last 24 hours)     Procedure Component Value Units Date/Time    CBC & Differential [[id number]]  (Abnormal) Collected: 11/13/21 0757    Specimen: Blood Updated: 11/13/21 0947    Narrative:      The following orders were created for panel order CBC & Differential.  Procedure                               Abnormality         Status                     ---------                               -----------         ------                     CBC Auto Differential[[id number]]        Abnormal            Final result                 Please view results for these tests on the individual orders.    " CBC Auto Differential [[id number]]  (Abnormal) Collected: 11/13/21 0757    Specimen: Blood Updated: 11/13/21 0947     WBC 8.37 10*3/mm3      RBC 4.71 10*6/mm3      Hemoglobin 14.6 g/dL      Hematocrit 44.5 %      MCV 94.5 fL      MCH 31.0 pg      MCHC 32.8 g/dL      RDW 14.2 %      RDW-SD 49.2 fl      MPV 12.4 fL      Platelets 67 10*3/mm3      Neutrophil % 67.8 %      Lymphocyte % 19.7 %      Monocyte % 9.0 %      Eosinophil % 2.4 %      Basophil % 0.4 %      Immature Grans % 0.7 %      Neutrophils, Absolute 5.68 10*3/mm3      Lymphocytes, Absolute 1.65 10*3/mm3      Monocytes, Absolute 0.75 10*3/mm3      Eosinophils, Absolute 0.20 10*3/mm3      Basophils, Absolute 0.03 10*3/mm3      Immature Grans, Absolute 0.06 10*3/mm3      nRBC 0.0 /100 WBC     Renal Function Panel [[id number]]  (Abnormal) Collected: 11/13/21 0757    Specimen: Blood Updated: 11/13/21 0908     Glucose 82 mg/dL      BUN 28 mg/dL      Creatinine 1.37 mg/dL      Sodium 140 mmol/L      Potassium 4.0 mmol/L      Comment: Slight hemolysis detected by analyzer. Results may be affected.        Chloride 109 mmol/L      CO2 24.0 mmol/L      Calcium 8.4 mg/dL      Albumin 2.50 g/dL      Phosphorus 1.7 mg/dL      Anion Gap 7.0 mmol/L      BUN/Creatinine Ratio 20.4     eGFR Non African Amer 51 mL/min/1.73     Narrative:      GFR Normal >60  Chronic Kidney Disease <60  Kidney Failure <15      Blood Culture - Blood, Arm, Left [[id number]]  (Normal) Collected: 11/08/21 1611    Specimen: Blood from Arm, Left Updated: 11/12/21 1645     Blood Culture No growth at 4 days    Blood Culture - Blood, Arm, Right [[id number]]  (Normal) Collected: 11/08/21 1523    Specimen: Blood from Arm, Right Updated: 11/12/21 1545     Blood Culture No growth at 4 days        Imaging Results (Last 24 Hours)     ** No results found for the last 24 hours. **                 ECG 12 Lead  Component   Ref Range & Units 11/8/21 1546   QT Interval   ms 351              HEART RATE= 107  bpm  RR  Interval= 560  ms  KS Interval= 129  ms  P Horizontal Axis= -10  deg  P Front Axis= 78  deg  QRSD Interval= 71  ms  QT Interval= 351  ms  QRS Axis= -60  deg  T Wave Axis= 57  deg  - ABNORMAL ECG -  Sinus tachycardia  Biatrial enlargement  LAD, consider left anterior fascicular block  ST elevation, consider inferior injury  NO PRIOR TRACING AVAILABLE FOR COMPARISON             Current Facility-Administered Medications:   •  acetaminophen (TYLENOL) tablet 650 mg, 650 mg, Oral, Q4H PRN, Fernando Ga MD  •  allopurinol (ZYLOPRIM) tablet 100 mg, 100 mg, Oral, Daily, Fernando Ga MD, 100 mg at 11/13/21 0846  •  aspirin chewable tablet 81 mg, 81 mg, Oral, Daily, Fernando Ga MD, 81 mg at 11/12/21 1806  •  cefTRIAXone (ROCEPHIN) 1 g in sodium chloride 0.9 % 100 mL IVPB-VTB, 1 g, Intravenous, Q24H, Fernando Ga MD, Last Rate: 200 mL/hr at 11/12/21 1805, 1 g at 11/12/21 1805  •  famotidine (PEPCID) tablet 20 mg, 20 mg, Oral, BID AC, Fernando Ga MD, 20 mg at 11/13/21 0846  •  megestrol (MEGACE) 40 MG/ML suspension 400 mg, 400 mg, Oral, Daily, Fernando Ga MD, 400 mg at 11/13/21 0846  •  metoprolol tartrate (LOPRESSOR) tablet 12.5 mg, 12.5 mg, Oral, Q12H, Fernando Ga MD, 12.5 mg at 11/13/21 0846  •  mirtazapine (REMERON) tablet 15 mg, 15 mg, Oral, Nightly, Fernando Ga MD, 15 mg at 11/12/21 2022  •  potassium & sodium phosphates (PHOS-NAK) oral packet, 2 packet, Oral, 4x Daily AC & at Bedtime, Brian Farrar MD  •  tamsulosin (FLOMAX) 24 hr capsule 0.4 mg, 0.4 mg, Oral, Daily, Rambo Nicholas MD, 0.4 mg at 11/13/21 0846  •  vitamin D (ERGOCALCIFEROL) capsule 50,000 Units, 50,000 Units, Oral, Q7 Days, Brian Farrar MD, 50,000 Units at 11/11/21 1744     ASSESSMENT  Acute kidney injury resolving  Acute UTI with sepsis  Severe dehydration  Bilateral hydronephrosis  Hypernatremia  Hyperkalemia  Elevated troponin with no chest pain  Elevated blood sugar with no history of diabetes  Failure to  thrive  Gastroesophageal reflux disease    PLAN  CPM  Discontinue IVF  Replace phosphorus  Continue IV antibiotics   Flomax  Voiding trial  Nephrology to follow patient  Stress ulcer DVT prophylaxis   Supportive care  PT/OT  Discussed with nursing staff  Discharge planning    STEVEN HERMAN MD

## 2021-11-13 NOTE — PROGRESS NOTES
"  [facility] Progress Note    Chief Complaint: None    Still unable to void.  Catheter was removed today.  He is on intermittent catheterization and now he is on Flomax.  I discussed the findings with the patient.  He does have a bit of blood in the urine.  Apparently his straight cath orders have been canceled so I have restarted them.    Review of Systems:    The following systems were reviewed and negative;  respiratory, cardiovascular and gastrointestinal          Vital Signs  /76 (BP Location: Right arm, Patient Position: Lying)   Pulse 113   Temp 97.5 °F (36.4 °C) (Oral)   Resp 18   Ht 177.8 cm (70\")   Wt 65.5 kg (144 lb 6.4 oz)   SpO2 95%   BMI 20.72 kg/m²     Physical Exam:     General Appearance:    Alert, cooperative, NAD   HEENT:    No trauma, pupils reactive, hearing intact   Back:     No CVA tenderness   Lungs:     Respirations unlabored, no wheezing    Heart:    RRR, intact peripheral pulses   Abdomen:     Soft, NDNT, no masses, no guarding   :   Penis normal some blood at the meatus   Extremities:   No edema, no deformity   Lymphatic:   No neck or groin LAD   Skin:   No bleeding, bruising or rashes   Neuro/Psych:   Orientation intact, mood/affect pleasant, no focal findings        Results Review:     None  Lab Results (last 24 hours)     Procedure Component Value Units Date/Time    Blood Culture - Blood, Arm, Left [[id number]]  (Normal) Collected: 11/08/21 1611    Specimen: Blood from Arm, Left Updated: 11/12/21 1645     Blood Culture No growth at 4 days    Blood Culture - Blood, Arm, Right [[id number]]  (Normal) Collected: 11/08/21 1523    Specimen: Blood from Arm, Right Updated: 11/12/21 1545     Blood Culture No growth at 4 days    CBC & Differential [[id number]]  (Abnormal) Collected: 11/12/21 0541    Specimen: Blood Updated: 11/12/21 6957    Narrative:      The following orders were created for panel order CBC & Differential.  Procedure                               Abnormality     "     Status                     ---------                               -----------         ------                     CBC Auto Differential[[id number]]        Abnormal            Final result                 Please view results for these tests on the individual orders.    CBC Auto Differential [[id number]]  (Abnormal) Collected: 11/12/21 0541    Specimen: Blood Updated: 11/12/21 0757     WBC 7.45 10*3/mm3      RBC 4.43 10*6/mm3      Hemoglobin 14.2 g/dL      Hematocrit 40.4 %      MCV 91.2 fL      MCH 32.1 pg      MCHC 35.1 g/dL      RDW 14.3 %      RDW-SD 47.1 fl      MPV 12.7 fL      Platelets 57 10*3/mm3      Neutrophil % 61.6 %      Lymphocyte % 24.0 %      Monocyte % 10.1 %      Eosinophil % 2.8 %      Basophil % 0.4 %      Immature Grans % 1.1 %      Neutrophils, Absolute 4.59 10*3/mm3      Lymphocytes, Absolute 1.79 10*3/mm3      Monocytes, Absolute 0.75 10*3/mm3      Eosinophils, Absolute 0.21 10*3/mm3      Basophils, Absolute 0.03 10*3/mm3      Immature Grans, Absolute 0.08 10*3/mm3      nRBC 0.0 /100 WBC     Comprehensive Metabolic Panel [[id number]]  (Abnormal) Collected: 11/12/21 0541    Specimen: Blood Updated: 11/12/21 0743     Glucose 71 mg/dL      BUN 40 mg/dL      Creatinine 1.44 mg/dL      Sodium 144 mmol/L      Potassium 4.1 mmol/L      Comment: Slight hemolysis detected by analyzer. Results may be affected.        Chloride 115 mmol/L      CO2 20.9 mmol/L      Calcium 8.4 mg/dL      Total Protein 5.1 g/dL      Albumin 2.70 g/dL      ALT (SGPT) 70 U/L      AST (SGOT) 87 U/L      Comment: Slight hemolysis detected by analyzer. Results may be affected.        Alkaline Phosphatase 72 U/L      Total Bilirubin 0.6 mg/dL      eGFR Non African Amer 48 mL/min/1.73      Globulin 2.4 gm/dL      A/G Ratio 1.1 g/dL      BUN/Creatinine Ratio 27.8     Anion Gap 8.1 mmol/L     Narrative:      GFR Normal >60  Chronic Kidney Disease <60  Kidney Failure <15      Uric Acid [[id number]]  (Normal) Collected: 11/12/21 0541     Specimen: Blood Updated: 11/12/21 0742     Uric Acid 6.2 mg/dL     T4, Free [[id number]]  (Normal) Collected: 11/12/21 0541    Specimen: Blood Updated: 11/12/21 0740     Free T4 0.95 ng/dL     Narrative:      Results may be falsely increased if patient taking Biotin.          Imaging Results (Last 24 Hours)     ** No results found for the last 24 hours. **          Medication Review:   I have personally reviewed    Current Facility-Administered Medications:   •  acetaminophen (TYLENOL) tablet 650 mg, 650 mg, Oral, Q4H PRN, Fernando Ga MD  •  allopurinol (ZYLOPRIM) tablet 100 mg, 100 mg, Oral, Daily, Fernando Ga MD, 100 mg at 11/12/21 1041  •  aspirin chewable tablet 81 mg, 81 mg, Oral, Daily, Fernando Ga MD, 81 mg at 11/12/21 1806  •  cefTRIAXone (ROCEPHIN) 1 g in sodium chloride 0.9 % 100 mL IVPB-VTB, 1 g, Intravenous, Q24H, Fernando Ga MD, Last Rate: 200 mL/hr at 11/12/21 1805, 1 g at 11/12/21 1805  •  famotidine (PEPCID) tablet 20 mg, 20 mg, Oral, BID AC, Fernando Ga MD, 20 mg at 11/12/21 1805  •  megestrol (MEGACE) 40 MG/ML suspension 400 mg, 400 mg, Oral, Daily, Fernando Ga MD, 400 mg at 11/12/21 1041  •  metoprolol tartrate (LOPRESSOR) tablet 12.5 mg, 12.5 mg, Oral, Q12H, Fernando Ga MD  •  mirtazapine (REMERON) tablet 15 mg, 15 mg, Oral, Nightly, Fernando Ga MD, 15 mg at 11/11/21 2059  •  sodium bicarbonate tablet 650 mg, 650 mg, Oral, BID, Fernando Ga MD  •  tamsulosin (FLOMAX) 24 hr capsule 0.4 mg, 0.4 mg, Oral, Daily, Rambo Nicholas MD, 0.4 mg at 11/12/21 1041  •  vitamin D (ERGOCALCIFEROL) capsule 50,000 Units, 50,000 Units, Oral, Q7 Days, Brian Farrar MD, 50,000 Units at 11/11/21 1744    Allergies:    Patient has no known allergies.    Assessment:    Active Problems:  Patient Active Problem List   Diagnosis   • DERECK (acute kidney injury) (HCC)   • Severe malnutrition (CMS/HCC)   • Vitamin D deficiency       BPH with urinary retention    Plan:    Start intermittent  catheterization.  He is on tamsulosin.  We will continue to follow check back with him on Monday.  Thanks      Rambo Nicholas MD    11/12/2021  19:01 EST

## 2021-11-14 LAB
ALBUMIN SERPL-MCNC: 2.9 G/DL (ref 3.5–5.2)
ANION GAP SERPL CALCULATED.3IONS-SCNC: 8.7 MMOL/L (ref 5–15)
BUN SERPL-MCNC: 24 MG/DL (ref 8–23)
BUN/CREAT SERPL: 19.8 (ref 7–25)
CALCIUM SPEC-SCNC: 8.6 MG/DL (ref 8.6–10.5)
CHLORIDE SERPL-SCNC: 108 MMOL/L (ref 98–107)
CO2 SERPL-SCNC: 25.3 MMOL/L (ref 22–29)
CREAT SERPL-MCNC: 1.21 MG/DL (ref 0.76–1.27)
GFR SERPL CREATININE-BSD FRML MDRD: 59 ML/MIN/1.73
GLUCOSE SERPL-MCNC: 92 MG/DL (ref 65–99)
PHOSPHATE SERPL-MCNC: 2.9 MG/DL (ref 2.5–4.5)
POTASSIUM SERPL-SCNC: 4.4 MMOL/L (ref 3.5–5.2)
SODIUM SERPL-SCNC: 142 MMOL/L (ref 136–145)

## 2021-11-14 PROCEDURE — 25010000002 CEFTRIAXONE PER 250 MG: Performed by: HOSPITALIST

## 2021-11-14 PROCEDURE — 80069 RENAL FUNCTION PANEL: CPT | Performed by: INTERNAL MEDICINE

## 2021-11-14 RX ADMIN — ASPIRIN 81 MG: 81 TABLET, CHEWABLE ORAL at 17:28

## 2021-11-14 RX ADMIN — METOPROLOL TARTRATE 12.5 MG: 25 TABLET, FILM COATED ORAL at 09:34

## 2021-11-14 RX ADMIN — FAMOTIDINE 20 MG: 20 TABLET, FILM COATED ORAL at 09:34

## 2021-11-14 RX ADMIN — Medication 2 PACKET: at 09:34

## 2021-11-14 RX ADMIN — MIRTAZAPINE 15 MG: 15 TABLET, FILM COATED ORAL at 20:16

## 2021-11-14 RX ADMIN — MEGESTROL ACETATE 400 MG: 40 SUSPENSION ORAL at 09:34

## 2021-11-14 RX ADMIN — TAMSULOSIN HYDROCHLORIDE 0.4 MG: 0.4 CAPSULE ORAL at 09:33

## 2021-11-14 RX ADMIN — ALLOPURINOL 100 MG: 100 TABLET ORAL at 09:34

## 2021-11-14 RX ADMIN — METOPROLOL TARTRATE 12.5 MG: 25 TABLET, FILM COATED ORAL at 20:16

## 2021-11-14 RX ADMIN — Medication 2 PACKET: at 20:16

## 2021-11-14 RX ADMIN — Medication 2 PACKET: at 17:28

## 2021-11-14 RX ADMIN — CEFTRIAXONE 1 G: 1 INJECTION, POWDER, FOR SOLUTION INTRAMUSCULAR; INTRAVENOUS at 17:28

## 2021-11-14 RX ADMIN — Medication 2 PACKET: at 11:42

## 2021-11-14 RX ADMIN — FAMOTIDINE 20 MG: 20 TABLET, FILM COATED ORAL at 17:28

## 2021-11-14 NOTE — PLAN OF CARE
Problem: Adult Inpatient Plan of Care  Goal: Plan of Care Review  Outcome: Ongoing, Progressing  Flowsheets (Taken 11/14/2021 1520)  Progress: improving  Plan of Care Reviewed With: patient  Outcome Summary: Patient has been pleasant and cooperative duirng shift. No complaints of pain, nausea or SOA. Patient on room air and AOx1. Assist x1. Patient bladder scanned Q6. Patient turns self in bed. Will continue to monitor and assist patient as needed.  Goal: Patient-Specific Goal (Individualized)  Outcome: Ongoing, Progressing  Goal: Absence of Hospital-Acquired Illness or Injury  Outcome: Ongoing, Progressing  Intervention: Identify and Manage Fall Risk  Recent Flowsheet Documentation  Taken 11/14/2021 1415 by Cruzito Ayala RN  Safety Promotion/Fall Prevention:   assistive device/personal items within reach   fall prevention program maintained   nonskid shoes/slippers when out of bed   safety round/check completed  Taken 11/14/2021 1200 by Cruzito Ayala RN  Safety Promotion/Fall Prevention:   assistive device/personal items within reach   fall prevention program maintained   nonskid shoes/slippers when out of bed   safety round/check completed  Taken 11/14/2021 1012 by Cruzito Ayala RN  Safety Promotion/Fall Prevention:   assistive device/personal items within reach   fall prevention program maintained   nonskid shoes/slippers when out of bed   safety round/check completed  Taken 11/14/2021 0935 by Cruzito Ayala RN  Safety Promotion/Fall Prevention:   assistive device/personal items within reach   fall prevention program maintained   nonskid shoes/slippers when out of bed   safety round/check completed  Intervention: Prevent Skin Injury  Recent Flowsheet Documentation  Taken 11/14/2021 1415 by Cruzito Ayala RN  Body Position: supine  Skin Protection:   tubing/devices free from skin contact   incontinence pads utilized  Taken 11/14/2021 1200 by Cruzito Ayala RN  Body Position: supine  Taken  11/14/2021 1012 by Cruzito Ayala RN  Body Position: supine  Taken 11/14/2021 0935 by Cruzito Ayala RN  Body Position: supine  Skin Protection:   tubing/devices free from skin contact   incontinence pads utilized  Goal: Optimal Comfort and Wellbeing  Outcome: Ongoing, Progressing  Goal: Readiness for Transition of Care  Outcome: Ongoing, Progressing     Problem: Fall Injury Risk  Goal: Absence of Fall and Fall-Related Injury  Outcome: Ongoing, Progressing  Intervention: Promote Injury-Free Environment  Recent Flowsheet Documentation  Taken 11/14/2021 1415 by Cruzito Ayala RN  Safety Promotion/Fall Prevention:   assistive device/personal items within reach   fall prevention program maintained   nonskid shoes/slippers when out of bed   safety round/check completed  Taken 11/14/2021 1200 by Cruzito Ayala RN  Safety Promotion/Fall Prevention:   assistive device/personal items within reach   fall prevention program maintained   nonskid shoes/slippers when out of bed   safety round/check completed  Taken 11/14/2021 1012 by Cruzito Ayala RN  Safety Promotion/Fall Prevention:   assistive device/personal items within reach   fall prevention program maintained   nonskid shoes/slippers when out of bed   safety round/check completed  Taken 11/14/2021 0935 by Cruzito Ayala RN  Safety Promotion/Fall Prevention:   assistive device/personal items within reach   fall prevention program maintained   nonskid shoes/slippers when out of bed   safety round/check completed     Problem: Electrolyte Imbalance (Acute Kidney Injury/Impairment)  Goal: Serum Electrolyte Balance  Outcome: Ongoing, Progressing     Problem: Fluid Imbalance (Acute Kidney Injury/Impairment)  Goal: Optimal Fluid Balance  Outcome: Ongoing, Progressing     Problem: Hematologic Alteration (Acute Kidney Injury/Impairment)  Goal: Hemoglobin, Hematocrit and Platelets Within Normal Range  Outcome: Ongoing, Progressing     Problem: Oral Intake  Inadequate (Acute Kidney Injury/Impairment)  Goal: Optimal Nutrition Intake  Outcome: Ongoing, Progressing     Problem: Renal Function Impairment (Acute Kidney Injury/Impairment)  Goal: Effective Renal Function  Outcome: Ongoing, Progressing     Problem: Skin Injury Risk Increased  Goal: Skin Health and Integrity  Outcome: Ongoing, Progressing  Intervention: Optimize Skin Protection  Recent Flowsheet Documentation  Taken 11/14/2021 1415 by Cruzito Ayala RN  Pressure Reduction Techniques:   frequent weight shift encouraged   weight shift assistance provided  Head of Bed (HOB): HOB flat  Pressure Reduction Devices: pressure-redistributing mattress utilized  Skin Protection:   tubing/devices free from skin contact   incontinence pads utilized  Taken 11/14/2021 1200 by Cruzito Ayala RN  Head of Bed (\[facility]\""): HOB flat  Taken 11/14/2021 1012 by Cruzito Ayala RN  Head of Bed (\[facility]\""): HOB flat  Taken 11/14/2021 0935 by Cruzito Ayala RN  Pressure Reduction Techniques:   frequent weight shift encouraged   weight shift assistance provided  Head of Bed (HOB): HOB flat  Pressure Reduction Devices: pressure-redistributing mattress utilized  Skin Protection:   tubing/devices free from skin contact   incontinence pads utilized     Problem: Malnutrition  Goal: Improved Nutritional Intake  Outcome: Ongoing, Progressing   Goal Outcome Evaluation:  Plan of Care Reviewed With: patient        Progress: improving  Outcome Summary: Patient has been pleasant and cooperative duirng shift. No complaints of pain, nausea or SOA. Patient on room air and AOx1. Assist x1. Patient bladder scanned Q6. Patient turns self in bed. Will continue to monitor and assist patient as needed.

## 2021-11-14 NOTE — PLAN OF CARE
Goal Outcome Evaluation:      Patient is resting well at this time and was straight cathed last night with a 16 [place] coude related to urine retention and bladder scan did show 835 present. Patient tolerated procedure well and did produce 750cc of clear, straw yellow urine without a strong odor or notable sediment seen. He is alert, confused, and talks or mumbles to himself often. VSS, he takes his medications crushed in applesauce without difficulty and is able to feed himself. Lungs are clear without coughing or congestion. Abdomen is soft with + BS in all quadrants.

## 2021-11-14 NOTE — PROGRESS NOTES
"   LOS: 6 days     Chief Complaint/ Reason for encounter: Acute kidney injury, severe hypernatremia  Chief Complaint   Patient presents with   • Weakness - Generalized         Subjective   Feels better today, eating and drinking little better  Denies any shortness of breath or chest pain  No fevers or chills  Improved appetite with no nausea or vomiting  No edema, improved urine output. Urine very dark, cola colored per RN    11/11 continues to improve.  Much more awake and alert.  Denies any fevers chills nausea vomiting shortness of breath chest pain or edema  White placed yesterday with over 2 L urine output, urology following and added Flomax    11/12 doing little better today.  More awake and alert, appetite slowly improved  White in place with plans to perform a voiding trial today    11/13 no new complaints, in good spirits.  Eating and drinking well, still not getting out of bed or moving around much.  Denies shortness of breath chest pain fevers chills nausea or vomiting    11/14 not much new today feeling better, slow improvement      Medical history reviewed:  Weakness - Generalized        Subjective    History taken from: Patient and chart    Vital Signs  Temp:  [97.4 °F (36.3 °C)-99.6 °F (37.6 °C)] 98.4 °F (36.9 °C)  Heart Rate:  [] 86  Resp:  [16-18] 18  BP: ()/(61-80) 109/80       Wt Readings from Last 1 Encounters:   11/12/21 0535 65.5 kg (144 lb 6.4 oz)   11/11/21 0500 70.5 kg (155 lb 6.8 oz)   11/10/21 0307 65.1 kg (143 lb 8.3 oz)   11/08/21 2115 60.8 kg (134 lb 0.6 oz)   11/08/21 1504 60.3 kg (133 lb)       Objective:  Vital signs: (most recent): Blood pressure 109/80, pulse 86, temperature 98.4 °F (36.9 °C), temperature source Oral, resp. rate 18, height 177.8 cm (70\"), weight 65.5 kg (144 lb 6.4 oz), SpO2 95 %.              Objective:  General Appearance: Somewhat malnourished, comfortable, chronically ill-appearing, in no acute distress and not in pain.  Awake, alert, " oriented  HEENT: Mucous membranes moist, no injury, oropharynx clear  Lungs:  Normal effort and normal respiratory rate.  Breath sounds clear to auscultation.  No  respiratory distress.  No rales, decreased breath sounds or rhonchi.    Heart: Normal rate.  Regular rhythm.  S1 normal.  No murmur.   Abdomen: Abdomen is soft.  Bowel sounds are normal, no abdominal tenderness.  There is no rebound or guarding  Extremities: Normal range of motion. No significant edema of bilateral lower extremities, distal pulses intact  Neurological: No focal motor or sensory deficits, pupils reactive  Skin:  Warm and dry.  No rash or cyanosis.       Results Review:    Intake/Output:     Intake/Output Summary (Last 24 hours) at 11/14/2021 1109  Last data filed at 11/14/2021 0100  Gross per 24 hour   Intake --   Output 1150 ml   Net -1150 ml         DATA:  Radiology and Labs:  The following labs independently reviewed by me. Additional labs ordered for tomorrow a.m.  Interval notes, chart personally reviewed by me.   Old records independently reviewed showing no prior known history of chronic kidney disease  The following radiologic studies independently viewed by me, findings renal ultrasound shows marked right hydronephrosis, moderate left hydronephrosis and distended urinary bladder, moderately enlarged prostate  New problems include hydronephrosis  Discussed with patient and his nurse at bedside    Risk/ complexity of medical care/ medical decision making moderate risk    Labs:   Recent Results (from the past 24 hour(s))   Renal Function Panel    Collection Time: 11/14/21  6:21 AM    Specimen: Blood   Result Value Ref Range    Glucose 92 65 - 99 mg/dL    BUN 24 (H) 8 - 23 mg/dL    Creatinine 1.21 0.76 - 1.27 mg/dL    Sodium 142 136 - 145 mmol/L    Potassium 4.4 3.5 - 5.2 mmol/L    Chloride 108 (H) 98 - 107 mmol/L    CO2 25.3 22.0 - 29.0 mmol/L    Calcium 8.6 8.6 - 10.5 mg/dL    Albumin 2.90 (L) 3.50 - 5.20 g/dL    Phosphorus 2.9 2.5  - 4.5 mg/dL    Anion Gap 8.7 5.0 - 15.0 mmol/L    BUN/Creatinine Ratio 19.8 7.0 - 25.0    eGFR Non African Amer 59 (L) >60 mL/min/1.73       Radiology:  Imaging Results (Last 24 Hours)     ** No results found for the last 24 hours. **             Medications have been reviewed:  Current Facility-Administered Medications   Medication Dose Route Frequency Provider Last Rate Last Admin   • acetaminophen (TYLENOL) tablet 650 mg  650 mg Oral Q4H PRN Fernando Ga MD       • allopurinol (ZYLOPRIM) tablet 100 mg  100 mg Oral Daily Fernando Ga MD   100 mg at 11/14/21 0934   • aspirin chewable tablet 81 mg  81 mg Oral Daily Fernando Ga MD   81 mg at 11/13/21 1836   • cefTRIAXone (ROCEPHIN) 1 g in sodium chloride 0.9 % 100 mL IVPB-VTB  1 g Intravenous Q24H Fernando Ga  mL/hr at 11/13/21 1836 1 g at 11/13/21 1836   • famotidine (PEPCID) tablet 20 mg  20 mg Oral BID AC Fernando Ga MD   20 mg at 11/14/21 0934   • megestrol (MEGACE) 40 MG/ML suspension 400 mg  400 mg Oral Daily Fernando Ga MD   400 mg at 11/14/21 0934   • metoprolol tartrate (LOPRESSOR) tablet 12.5 mg  12.5 mg Oral Q12H Fernando Ga MD   12.5 mg at 11/14/21 0934   • mirtazapine (REMERON) tablet 15 mg  15 mg Oral Nightly Fernando Ga MD   15 mg at 11/13/21 2054   • potassium & sodium phosphates (PHOS-NAK) oral packet  2 packet Oral 4x Daily AC & at Bedtime Brian Farrar MD   2 packet at 11/14/21 0934   • tamsulosin (FLOMAX) 24 hr capsule 0.4 mg  0.4 mg Oral Daily Rambo Nicholas MD   0.4 mg at 11/14/21 0933   • vitamin D (ERGOCALCIFEROL) capsule 50,000 Units  50,000 Units Oral Q7 Days Brian Farrar MD   50,000 Units at 11/11/21 0886       ASSESSMENT:  severe renal failure secondary to profound dehydration, improved with fluids  Profound hypernatremia, still high but much improved  Lactic acidosis from hypotension  Hypotension related to severe dehydration, mildly better  Elevated LFTs  Hypercalcemia secondary to  dehydration  Elevated hemoglobin related to hemoconcentration/dehydration  Cachexia and malnutrition  History of autism and cognitive deficits  Hyperphosphatemia related to renal failure  Mild CK elevation, possibly some element of mild rhabdo  New, vitamin D deficiency  hypophosphatemia        PLAN:  Renal function, volume and electrolytes continue to label on his  Continue to encourage oral fluid intake and nutrition  Acidosis improved.  Off bicarb  Continue oral phosphorus replacement and vitamin D  Intermittent catheterization as needed    IV Rocephin for UTI  nutrition following to assist with increased daily caloric intake/nutritional needs, also on Megace    Continue to monitor electrolytes and volume closely, avoid IV contrast and nephrotoxic medications   Follow-up a.m. labs.  Discharge planning      Brian Farrar MD   [facility]   Office phone number: [phone]  Answering service phone number: [phone]    11/14/21  11:09 EST    Dictation performed using Dragon dictation software

## 2021-11-14 NOTE — PROGRESS NOTES
"Daily progress note    Chief complaint  Doing better  No new complaints  Denies any chest pain shortness of breath palpitation    History of present illness  71-year-old white male with no medical problems and no home medication brought to the emergency room by the family with generalized weakness for 1 month failure to thrive not eating drinking for several days to week.  Patient has no fever cough chest pain shortness of breath abdominal pain nausea vomiting diarrhea.  Patient looks cachectic and work-up in ER revealed acute kidney injury with severe dehydration and hypernatremia also found to have UTI admit for management.  Patient remained fully awake alert and oriented x3.     REVIEW OF SYSTEMS  Unremarkable except generalized weakness     PHYSICAL EXAM   Blood pressure 90/51, pulse 86, temperature 98.5 °F (36.9 °C), temperature source Oral, resp. rate 16, height 177.8 cm (70\"), weight 65.5 kg (144 lb 6.4 oz), SpO2 98 %.    GENERAL: Awake and alert, acutely ill-appearing cachectic, not distressed  HEENT:  Unremarkable  CV: regular rhythm, tachycardic rate, no murmur  RESPIRATORY: normal effort, CTA  ABDOMEN: soft, nontender bowel sounds positive  MUSCULOSKELETAL: no deformity, FROM, no calf swelling or tenderness  NEURO: alert, slurred speech, moves all extremities, follows commands  SKIN: warm, dry     LAB RESULTS  Lab Results (last 24 hours)     Procedure Component Value Units Date/Time    Renal Function Panel [[id number]]  (Abnormal) Collected: 11/14/21 0621    Specimen: Blood Updated: 11/14/21 0724     Glucose 92 mg/dL      BUN 24 mg/dL      Creatinine 1.21 mg/dL      Sodium 142 mmol/L      Potassium 4.4 mmol/L      Chloride 108 mmol/L      CO2 25.3 mmol/L      Calcium 8.6 mg/dL      Albumin 2.90 g/dL      Phosphorus 2.9 mg/dL      Anion Gap 8.7 mmol/L      BUN/Creatinine Ratio 19.8     eGFR Non African Amer 59 mL/min/1.73     Narrative:      GFR Normal >60  Chronic Kidney Disease <60  Kidney Failure <15   "    Blood Culture - Blood, Arm, Left [[id number]]  (Normal) Collected: 11/08/21 1611    Specimen: Blood from Arm, Left Updated: 11/13/21 1645     Blood Culture No growth at 5 days    Blood Culture - Blood, Arm, Right [[id number]]  (Normal) Collected: 11/08/21 1523    Specimen: Blood from Arm, Right Updated: 11/13/21 1545     Blood Culture No growth at 5 days        Imaging Results (Last 24 Hours)     ** No results found for the last 24 hours. **                 ECG 12 Lead  Component   Ref Range & Units 11/8/21 1546   QT Interval   ms 351              HEART RATE= 107  bpm  RR Interval= 560  ms  AK Interval= 129  ms  P Horizontal Axis= -10  deg  P Front Axis= 78  deg  QRSD Interval= 71  ms  QT Interval= 351  ms  QRS Axis= -60  deg  T Wave Axis= 57  deg  - ABNORMAL ECG -  Sinus tachycardia  Biatrial enlargement  LAD, consider left anterior fascicular block  ST elevation, consider inferior injury  NO PRIOR TRACING AVAILABLE FOR COMPARISON             Current Facility-Administered Medications:   •  acetaminophen (TYLENOL) tablet 650 mg, 650 mg, Oral, Q4H PRN, Fernando Ga MD  •  allopurinol (ZYLOPRIM) tablet 100 mg, 100 mg, Oral, Daily, Fernando Ga MD, 100 mg at 11/14/21 0934  •  aspirin chewable tablet 81 mg, 81 mg, Oral, Daily, Fernando Ga MD, 81 mg at 11/13/21 1836  •  cefTRIAXone (ROCEPHIN) 1 g in sodium chloride 0.9 % 100 mL IVPB-VTB, 1 g, Intravenous, Q24H, Fernando Ga MD, Last Rate: 200 mL/hr at 11/13/21 1836, 1 g at 11/13/21 1836  •  famotidine (PEPCID) tablet 20 mg, 20 mg, Oral, BID AC, Fernando Ga MD, 20 mg at 11/14/21 0934  •  megestrol (MEGACE) 40 MG/ML suspension 400 mg, 400 mg, Oral, Daily, Fernando Ga MD, 400 mg at 11/14/21 0934  •  metoprolol tartrate (LOPRESSOR) tablet 12.5 mg, 12.5 mg, Oral, Q12H, Fernando Ga MD, 12.5 mg at 11/14/21 0979  •  mirtazapine (REMERON) tablet 15 mg, 15 mg, Oral, Nightly, Fernando Ga MD, 15 mg at 11/13/21 2054  •  potassium & sodium phosphates (PHOS-NAK) oral  packet, 2 packet, Oral, 4x Daily AC & at Bedtime, Brian Farrar MD, 2 packet at 11/14/21 1142  •  tamsulosin (FLOMAX) 24 hr capsule 0.4 mg, 0.4 mg, Oral, Daily, Rambo Nicholas MD, 0.4 mg at 11/14/21 0959  •  vitamin D (ERGOCALCIFEROL) capsule 50,000 Units, 50,000 Units, Oral, Q7 Days, Brian Farrar MD, 50,000 Units at 11/11/21 2166     ASSESSMENT  Acute kidney injury resolved  Acute UTI with sepsis  Severe dehydration  Urinary retention  Bilateral hydronephrosis  Hypernatremia  Hyperkalemia  Elevated troponin with no chest pain  Elevated blood sugar with no history of diabetes  Failure to thrive  Gastroesophageal reflux disease    PLAN  CPM  Discontinue IVF  Continue IV antibiotics   Flomax  Voiding trial and urology following  Nephrology to follow patient  Stress ulcer DVT prophylaxis   Supportive care  PT/OT  Discussed with nursing staff  Subacute rehab in a.m. if bed available    STEVEN HERMAN MD

## 2021-11-15 LAB
ALBUMIN SERPL-MCNC: 3.1 G/DL (ref 3.5–5.2)
ANION GAP SERPL CALCULATED.3IONS-SCNC: 8.7 MMOL/L (ref 5–15)
BUN SERPL-MCNC: 23 MG/DL (ref 8–23)
BUN/CREAT SERPL: 14.1 (ref 7–25)
CALCIUM SPEC-SCNC: 8.9 MG/DL (ref 8.6–10.5)
CHLORIDE SERPL-SCNC: 108 MMOL/L (ref 98–107)
CO2 SERPL-SCNC: 26.3 MMOL/L (ref 22–29)
CREAT SERPL-MCNC: 1.63 MG/DL (ref 0.76–1.27)
GFR SERPL CREATININE-BSD FRML MDRD: 42 ML/MIN/1.73
GLUCOSE SERPL-MCNC: 97 MG/DL (ref 65–99)
PHOSPHATE SERPL-MCNC: 3.9 MG/DL (ref 2.5–4.5)
POTASSIUM SERPL-SCNC: 4.7 MMOL/L (ref 3.5–5.2)
SODIUM SERPL-SCNC: 143 MMOL/L (ref 136–145)

## 2021-11-15 PROCEDURE — 97530 THERAPEUTIC ACTIVITIES: CPT

## 2021-11-15 PROCEDURE — 25010000002 CEFTRIAXONE PER 250 MG: Performed by: HOSPITALIST

## 2021-11-15 PROCEDURE — 80069 RENAL FUNCTION PANEL: CPT | Performed by: INTERNAL MEDICINE

## 2021-11-15 RX ADMIN — METOPROLOL TARTRATE 12.5 MG: 25 TABLET, FILM COATED ORAL at 08:38

## 2021-11-15 RX ADMIN — ASPIRIN 81 MG: 81 TABLET, CHEWABLE ORAL at 17:28

## 2021-11-15 RX ADMIN — TAMSULOSIN HYDROCHLORIDE 0.4 MG: 0.4 CAPSULE ORAL at 08:38

## 2021-11-15 RX ADMIN — FAMOTIDINE 20 MG: 20 TABLET, FILM COATED ORAL at 08:38

## 2021-11-15 RX ADMIN — MIRTAZAPINE 15 MG: 15 TABLET, FILM COATED ORAL at 20:18

## 2021-11-15 RX ADMIN — METOPROLOL TARTRATE 12.5 MG: 25 TABLET, FILM COATED ORAL at 20:18

## 2021-11-15 RX ADMIN — CEFTRIAXONE 1 G: 1 INJECTION, POWDER, FOR SOLUTION INTRAMUSCULAR; INTRAVENOUS at 17:28

## 2021-11-15 RX ADMIN — FAMOTIDINE 20 MG: 20 TABLET, FILM COATED ORAL at 17:28

## 2021-11-15 RX ADMIN — MEGESTROL ACETATE 400 MG: 40 SUSPENSION ORAL at 08:38

## 2021-11-15 NOTE — PLAN OF CARE
Goal Outcome Evaluation:         Pt resting and originally declined PT but agreed with encouragment. Pt sba/cga for bed mobility today. CGa for STS to rwx. Pt amb with markedly flexed posture, shuffle pattern with impaired DF RLE and reports of pain on dorsum of feet right > left. Pt impulsive with rwx and need continuous vc for safe use. Pt amb 30'x2 with cga/minx1 He declined further amb due to pain and fatigue. REcommend chair at door and amb outside of room as able to increase distance. Recommend BLE ex/rom DF/pf prior to gait  with progression to DF> neutral.

## 2021-11-15 NOTE — PROGRESS NOTES
"   LOS: 7 days     Chief Complaint/ Reason for encounter: Acute kidney injury, severe hypernatremia  Chief Complaint   Patient presents with   • Weakness - Generalized         Subjective   Feels better today, eating and drinking little better  Denies any shortness of breath or chest pain  No fevers or chills  Improved appetite with no nausea or vomiting  No edema, improved urine output. Urine very dark, cola colored per RN    11/11 continues to improve.  Much more awake and alert.  Denies any fevers chills nausea vomiting shortness of breath chest pain or edema  White placed yesterday with over 2 L urine output, urology following and added Flomax    11/12 doing little better today.  More awake and alert, appetite slowly improved  White in place with plans to perform a voiding trial today    11/13 no new complaints, in good spirits.  Eating and drinking well, still not getting out of bed or moving around much.  Denies shortness of breath chest pain fevers chills nausea or vomiting    11/14 not much new today feeling better, slow improvement    11/15 no new complaints or events today. He states he is eating and drinking well      Medical history reviewed:  Weakness - Generalized        Subjective    History taken from: Patient and chart    Vital Signs  Temp:  [97.8 °F (36.6 °C)-98.5 °F (36.9 °C)] 98.5 °F (36.9 °C)  Heart Rate:  [] 112  Resp:  [16] 16  BP: ()/(51-87) 118/87       Wt Readings from Last 1 Encounters:   11/12/21 0535 65.5 kg (144 lb 6.4 oz)   11/11/21 0500 70.5 kg (155 lb 6.8 oz)   11/10/21 0307 65.1 kg (143 lb 8.3 oz)   11/08/21 2115 60.8 kg (134 lb 0.6 oz)   11/08/21 1504 60.3 kg (133 lb)       Objective:  Vital signs: (most recent): Blood pressure 118/87, pulse 112, temperature 98.5 °F (36.9 °C), temperature source Oral, resp. rate 16, height 177.8 cm (70\"), weight 65.5 kg (144 lb 6.4 oz), SpO2 96 %.              Objective:  General Appearance: Somewhat malnourished, comfortable, " chronically ill-appearing, in no acute distress and not in pain.  Awake, alert, oriented  HEENT: Mucous membranes moist, no injury, oropharynx clear  Lungs:  Normal effort and normal respiratory rate.  Breath sounds clear to auscultation.  No  respiratory distress.  No rales, decreased breath sounds or rhonchi.    Heart: Normal rate.  Regular rhythm.  S1 normal.  No murmur.   Abdomen: Abdomen is soft.  Bowel sounds are normal, no abdominal tenderness.  There is no rebound or guarding  Extremities: Normal range of motion. No significant edema of bilateral lower extremities, distal pulses intact  Neurological: No focal motor or sensory deficits, pupils reactive  Skin:  Warm and dry.  No rash or cyanosis.       Results Review:    Intake/Output:     Intake/Output Summary (Last 24 hours) at 11/15/2021 1053  Last data filed at 11/14/2021 1749  Gross per 24 hour   Intake --   Output 800 ml   Net -800 ml         DATA:  Radiology and Labs:  The following labs independently reviewed by me. Additional labs ordered for tomorrow a.m.  Interval notes, chart personally reviewed by me.   Old records independently reviewed showing no prior known history of chronic kidney disease  The following radiologic studies independently viewed by me, findings renal ultrasound shows marked right hydronephrosis, moderate left hydronephrosis and distended urinary bladder, moderately enlarged prostate  New problems include hydronephrosis  Discussed with patient and his nurse at bedside    Risk/ complexity of medical care/ medical decision making moderate risk    Labs:   Recent Results (from the past 24 hour(s))   Renal Function Panel    Collection Time: 11/15/21  7:27 AM    Specimen: Blood   Result Value Ref Range    Glucose 97 65 - 99 mg/dL    BUN 23 8 - 23 mg/dL    Creatinine 1.63 (H) 0.76 - 1.27 mg/dL    Sodium 143 136 - 145 mmol/L    Potassium 4.7 3.5 - 5.2 mmol/L    Chloride 108 (H) 98 - 107 mmol/L    CO2 26.3 22.0 - 29.0 mmol/L    Calcium  8.9 8.6 - 10.5 mg/dL    Albumin 3.10 (L) 3.50 - 5.20 g/dL    Phosphorus 3.9 2.5 - 4.5 mg/dL    Anion Gap 8.7 5.0 - 15.0 mmol/L    BUN/Creatinine Ratio 14.1 7.0 - 25.0    eGFR Non African Amer 42 (L) >60 mL/min/1.73       Radiology:  Imaging Results (Last 24 Hours)     ** No results found for the last 24 hours. **             Medications have been reviewed:  Current Facility-Administered Medications   Medication Dose Route Frequency Provider Last Rate Last Admin   • acetaminophen (TYLENOL) tablet 650 mg  650 mg Oral Q4H PRN Fernando Ga MD       • aspirin chewable tablet 81 mg  81 mg Oral Daily Fernando Ga MD   81 mg at 11/14/21 1728   • cefTRIAXone (ROCEPHIN) 1 g in sodium chloride 0.9 % 100 mL IVPB-VTB  1 g Intravenous Q24H Fernando Ga  mL/hr at 11/14/21 1728 1 g at 11/14/21 1728   • famotidine (PEPCID) tablet 20 mg  20 mg Oral BID AC Fernando Ga MD   20 mg at 11/15/21 0838   • megestrol (MEGACE) 40 MG/ML suspension 400 mg  400 mg Oral Daily Fernando Ga MD   400 mg at 11/15/21 0838   • metoprolol tartrate (LOPRESSOR) tablet 12.5 mg  12.5 mg Oral Q12H Fernando Ga MD   12.5 mg at 11/15/21 0838   • mirtazapine (REMERON) tablet 15 mg  15 mg Oral Nightly Fernando Ga MD   15 mg at 11/14/21 2016   • tamsulosin (FLOMAX) 24 hr capsule 0.4 mg  0.4 mg Oral Daily Rambo Nicholas MD   0.4 mg at 11/15/21 0838   • vitamin D (ERGOCALCIFEROL) capsule 50,000 Units  50,000 Units Oral Q7 Days Brian Farrar MD   50,000 Units at 11/11/21 1744       ASSESSMENT:  severe renal failure secondary to profound dehydration, improved with fluids  Profound hypernatremia, still high but much improved  Lactic acidosis from hypotension  Hypotension related to severe dehydration, mildly better  Elevated LFTs  Hypercalcemia secondary to dehydration  Elevated hemoglobin related to hemoconcentration/dehydration  Cachexia and malnutrition  History of autism and cognitive deficits  Hyperphosphatemia related to renal  failure  Mild CK elevation, possibly some element of mild rhabdo  New, vitamin D deficiency  hypophosphatemia        PLAN:  Creatinine up slightly today but oral intake continues to improve  His albumin is improving consistent with improved nutrition  Still requiring intermittent catheterization but most recent bladder scan was only 292 mL  IV antibiotics per medicine team  Continue Megace appetite stimulant  Maintain on vitamin D and Flomax  Would check with urology prior to discharge regarding plans for intermittent catheterization  Otherwise stable for discharge anytime from a renal standpoint    If he goes to rehab I would check a renal panel on Friday      Brian Farrar MD   [facility]   Office phone number: [phone]  Answering service phone number: [phone]    11/15/21  10:53 EST    Dictation performed using Dragon dictation software

## 2021-11-15 NOTE — CASE MANAGEMENT/SOCIAL WORK
Continued Stay Note  [facility]     Patient Name: Pedro Peck  MRN: [id number]  Today's Date: 11/15/2021    Admit Date: 11/8/2021     Discharge Plan     Row Name 11/15/21 1417       Plan    Plan Plan skilled care at accepting facility.   YOSSI Bauer RN    Patient/Family in Agreement with Plan yes    Plan Comments Per Zahira  ( [phone]) she does not anticipate bed prior to 11/17.  Francie  ( [phone]) is following for [facility].  Plan skilled care at an accepting facility.   YOSSI Bauer RN               Discharge Codes    No documentation.               Expected Discharge Date and Time     Expected Discharge Date Expected Discharge Time    Nov 15, 2021             Janice Bauer, RN

## 2021-11-15 NOTE — CASE MANAGEMENT/SOCIAL WORK
Continued Stay Note  [facility]     Patient Name: Pedro Peck  MRN: [id number]  Today's Date: 11/15/2021    Admit Date: 11/8/2021     Discharge Plan     Row Name 11/15/21 1533       Plan    Plan Plan [facility] for skilled care.  YOSSI Bauer RN    Patient/Family in Agreement with Plan yes    Plan Comments Per Francie  ( [phone]) she anticipates a bed at [facility] at discharge.   Plan [facility] for skilled care.   YOSSI Bauer RN    Row Name 11/15/21 1410       Plan    Plan Plan skilled care at accepting facility.   YOSSI Bauer RN    Patient/Family in Agreement with Plan yes    Plan Comments Per Zahira  ( [phone]) she does not anticipate bed prior to 11/17.  Francie  ( [phone]) is following for [facility].  Plan skilled care at an accepting facility.   YOSSI Bauer RN               Discharge Codes    No documentation.               Expected Discharge Date and Time     Expected Discharge Date Expected Discharge Time    Nov 15, 2021             Janice Bauer RN

## 2021-11-15 NOTE — THERAPY TREATMENT NOTE
Patient Name: Pedro Peck  : 1950    MRN: [id number]                              Today's Date: 11/15/2021       Admit Date: 2021    Visit Dx:     ICD-10-CM ICD-9-CM   1. DERECK (acute kidney injury) (HCC)  N17.9 584.9   2. Hypotension, unspecified hypotension type  I95.9 458.9   3. Sepsis, due to unspecified organism, unspecified whether acute organ dysfunction present (HCC)  A41.9 038.9     995.91   4. Hypernatremia  E87.0 276.0   5. Lactic acidosis  E87.2 276.2   6. Elevated troponin  R77.8 790.6     Patient Active Problem List   Diagnosis   • DERECK (acute kidney injury) (HCC)   • Severe malnutrition (CMS/HCC)   • Vitamin D deficiency   • Hypophosphatemia     Past Medical History:   Diagnosis Date   • Autism      History reviewed. No pertinent surgical history.   General Information     Row Name 11/15/21 1542          Physical Therapy Time and Intention    Document Type therapy note (daily note)  -     Mode of Treatment individual therapy; physical therapy  -     Row Name 11/15/21 1542          General Information    Patient Profile Reviewed yes  -SV           User Key  (r) = Recorded By, (t) = Taken By, (c) = Cosigned By    Initials Name Provider Type     Ashwini Hancock, PT Physical Therapist               Mobility     Row Name 11/15/21 1632          Bed Mobility    Supine-Sit [place] (Bed Mobility) contact guard; verbal cues; nonverbal cues (demo/gesture)  -     Sit-Supine [place] (Bed Mobility) contact guard; verbal cues  -     Assistive Device (Bed Mobility) draw sheet; head of bed elevated  -     Row Name 11/15/21 1632          Sit-Stand Transfer    Sit-Stand [place] (Transfers) contact guard; minimum assist (75% patient effort); verbal cues; nonverbal cues (demo/gesture)  -     Assistive Device (Sit-Stand Transfers) walker, front-wheeled  -     Row Name 11/15/21 1632          Gait/Stairs (Locomotion)    [place] Level (Gait) contact guard; minimum assist (75%  patient effort); verbal cues; nonverbal cues (demo/gesture)  -SV     Assistive Device (Gait) walker, front-wheeled  -SV     Distance in Feet (Gait) 30'x2 antalgic gait RLE , wid jorge, limited DF , denies calf pain with arom , and homans sign, some pain with mild stretch today : RN notifed  -SV     Comment (Gait/Stairs) seated rest, declined 3 rd attempt  -SV           User Key  (r) = Recorded By, (t) = Taken By, (c) = Cosigned By    Initials Name Provider Type    Ashwini Gatica, PT Physical Therapist               Obj/Interventions     Row Name 11/15/21 1634          Motor Skills    Therapeutic Exercise --  seated laq, df , pf arom 5-7 reps lesly, right ankle pF stretch by PT 1 rep 30 h  -SV           User Key  (r) = Recorded By, (t) = Taken By, (c) = Cosigned By    Initials Name Provider Type    Ashwini Gatica, PT Physical Therapist               Goals/Plan    No documentation.                Clinical Impression     Row Name 11/15/21 1634          Pain Scale: Numbers Pre/Post-Treatment    Pre/Posttreatment Pain Comment reports pain on bottom of feet during amb  -SV     Row Name 11/15/21 1634          Vital Signs    O2 Delivery Pre Treatment room air  -SV     O2 Delivery Intra Treatment room air  -SV     O2 Delivery Post Treatment room air  -SV     Pre Patient Position Supine  -SV     Intra Patient Position Standing  -SV     Post Patient Position Side Lying  -SV     Row Name 11/15/21 1634          Positioning and Restraints    Pre-Treatment Position in bed  -SV     Post Treatment Position bed  -SV     In Bed notified nsg; call light within reach; encouraged to call for assist; exit alarm on; side lying left  -SV           User Key  (r) = Recorded By, (t) = Taken By, (c) = Cosigned By    Initials Name Provider Type    Ashwini Gatica, PT Physical Therapist               Outcome Measures     Row Name 11/15/21 1635 11/15/21 0841       How much help from another person do you currently need...    Turning from  your back to your side while in flat bed without using bedrails? 4  -SV 4  -MS    Moving from lying on back to sitting on the side of a flat bed without bedrails? 4  -SV 4  -MS    Moving to and from a bed to a chair (including a wheelchair)? 3  -SV 3  -MS    Standing up from a chair using your arms (e.g., wheelchair, bedside chair)? 3  -SV 3  -MS    Climbing 3-5 steps with a railing? 2  -SV 2  -MS    To walk in hospital room? 2  -SV 3  -MS    AM-PAC 6 Clicks Score (PT) 18  -SV 19  -MS    Row Name 11/15/21 1635          Functional Assessment    Outcome Measure Options AM-PAC 6 Clicks Basic Mobility (PT)  -SV           User Key  (r) = Recorded By, (t) = Taken By, (c) = Cosigned By    Initials Name Provider Type    Cruzito Ledesma, RN Registered Nurse    Ashwini Gatica, PT Physical Therapist                             Physical Therapy Education                 Title: PT OT SLP Therapies (Done)     Topic: Physical Therapy (Done)     Point: Mobility training (Done)     Learning Progress Summary           Patient Acceptance, E,TB,D, VU,NR by  at 11/15/2021 1635    Acceptance, E,TB, VU,NR by  at 11/13/2021 0638    Acceptance, E,TB,D, VU,NR by  at 11/12/2021 1548    Acceptance, E, VU by  at 11/11/2021 1127    Acceptance, E, VU,NR by  at 11/10/2021 1217                   Point: Home exercise program (Done)     Learning Progress Summary           Patient Acceptance, E,TB,D, VU,NR by  at 11/15/2021 1635    Acceptance, E,TB, VU,NR by  at 11/13/2021 0638    Acceptance, E,TB,D, VU,NR by  at 11/12/2021 1548    Acceptance, E, VU by  at 11/11/2021 1127    Acceptance, E, VU,NR by  at 11/10/2021 1217                   Point: Body mechanics (Done)     Learning Progress Summary           Patient Acceptance, E,TB,D, VU,NR by  at 11/15/2021 1635    Acceptance, E,TB, VU,NR by  at 11/13/2021 0638    Acceptance, E,TB,D, VU,NR by SM at 11/12/2021 1548    Acceptance, E, VU by DB at 11/11/2021 1127     Acceptance, E, VU,NR by  at 11/10/2021 1217                   Point: Precautions (Done)     Learning Progress Summary           Patient Acceptance, E,TB,D, VU,NR by  at 11/15/2021 1635    Acceptance, E,TB, VU,NR by  at 11/13/2021 0638    Acceptance, E,TB,D, VU,NR by  at 11/12/2021 1548    Acceptance, E, VU by  at 11/11/2021 1127    Acceptance, E, VU,NR by  at 11/10/2021 1217                               User Key     Initials Effective Dates Name Provider Type Discipline     06/16/21 -  Ashwini Hancock, PT Physical Therapist PT     03/07/18 -  Rylee Harvey, PTA Physical Therapy Assistant PT    DB 06/16/21 -  Diann Mahan, PT Physical Therapist PT     06/16/21 -  Gerri Cutler, PT Physical Therapist PT     03/10/21 -  Daniela Garcia, RN Registered Nurse Nurse              PT Recommendation and Plan           Time Calculation:    PT Charges     Row Name 11/15/21 1639             Time Calculation    Start Time 1542  -      Stop Time 1556  -      Time Calculation (min) 14 min  -      PT Received On 11/15/21  -      PT - Next Appointment 11/16/21  -            User Key  (r) = Recorded By, (t) = Taken By, (c) = Cosigned By    Initials Name Provider Type     Ashwini Hancock, PT Physical Therapist              Therapy Charges for Today     Code Description Service Date Service Provider Modifiers Qty    37955400989 HC PT THERAPEUTIC ACT EA 15 MIN 11/15/2021 Ashwini Hancock, PT GP 1          PT G-Codes  Outcome Measure Options: AM-PAC 6 Clicks Basic Mobility (PT)  AM-PAC 6 Clicks Score (PT): 18  AM-PAC 6 Clicks Score (OT): 16    Ashwini Hancock PT  11/15/2021

## 2021-11-15 NOTE — PROGRESS NOTES
"  [facility] Progress Note    Chief Complaint: Nothing new    Patient still unable to void though he told me he was voiding after discussion with his RN the ER still requiring straight catheterization. He is on his tamsulosin.    Review of Systems:    Review of systems could not be obtained due to  Unreliable answers          Vital Signs  /87 (BP Location: Right arm, Patient Position: Lying)   Pulse 112   Temp 98.5 °F (36.9 °C) (Oral)   Resp 16   Ht 177.8 cm (70\")   Wt 65.5 kg (144 lb 6.4 oz)   SpO2 96%   BMI 20.72 kg/m²     Physical Exam:     General Appearance:    Alert, cooperative, NAD   HEENT:    No trauma, pupils reactive, hearing intact   Back:     No CVA tenderness   Lungs:     Respirations unlabored, no wheezing    Heart:    RRR, intact peripheral pulses   Abdomen:     Soft, NDNT, no masses, no guarding   :   Phallus normal   Extremities:   No edema, no deformity   Lymphatic:   No neck or groin LAD   Skin:   No bleeding, bruising or rashes   Neuro/Psych:   Orientation intact, mood/affect pleasant, no focal findings        Results Review:     I reviewed the patient's new clinical results.  Lab Results (last 24 hours)     Procedure Component Value Units Date/Time    Renal Function Panel [[id number]]  (Abnormal) Collected: 11/15/21 0727    Specimen: Blood Updated: 11/15/21 0813     Glucose 97 mg/dL      BUN 23 mg/dL      Creatinine 1.63 mg/dL      Sodium 143 mmol/L      Potassium 4.7 mmol/L      Chloride 108 mmol/L      CO2 26.3 mmol/L      Calcium 8.9 mg/dL      Albumin 3.10 g/dL      Phosphorus 3.9 mg/dL      Anion Gap 8.7 mmol/L      BUN/Creatinine Ratio 14.1     eGFR Non African Amer 42 mL/min/1.73     Narrative:      GFR Normal >60  Chronic Kidney Disease <60  Kidney Failure <15          Imaging Results (Last 24 Hours)     ** No results found for the last 24 hours. **          Medication Review:   I have personally reviewed    Current Facility-Administered Medications:   •  acetaminophen " (TYLENOL) tablet 650 mg, 650 mg, Oral, Q4H PRN, Fernando Ga MD  •  aspirin chewable tablet 81 mg, 81 mg, Oral, Daily, Fernando Ga MD, 81 mg at 11/14/21 1728  •  cefTRIAXone (ROCEPHIN) 1 g in sodium chloride 0.9 % 100 mL IVPB-VTB, 1 g, Intravenous, Q24H, Fernando Ga MD, Last Rate: 200 mL/hr at 11/14/21 1728, 1 g at 11/14/21 1728  •  famotidine (PEPCID) tablet 20 mg, 20 mg, Oral, BID AC, Fernando Ga MD, 20 mg at 11/15/21 0838  •  megestrol (MEGACE) 40 MG/ML suspension 400 mg, 400 mg, Oral, Daily, Fernando Ga MD, 400 mg at 11/15/21 0838  •  metoprolol tartrate (LOPRESSOR) tablet 12.5 mg, 12.5 mg, Oral, Q12H, Fernando Ga MD, 12.5 mg at 11/15/21 0838  •  mirtazapine (REMERON) tablet 15 mg, 15 mg, Oral, Nightly, Fernando Ga MD, 15 mg at 11/14/21 2016  •  tamsulosin (FLOMAX) 24 hr capsule 0.4 mg, 0.4 mg, Oral, Daily, Rambo Nicholas MD, 0.4 mg at 11/15/21 0838  •  vitamin D (ERGOCALCIFEROL) capsule 50,000 Units, 50,000 Units, Oral, Q7 Days, Brian Farrar MD, 50,000 Units at 11/11/21 1744    Allergies:    Patient has no known allergies.    Assessment:    Active Problems:  Patient Active Problem List   Diagnosis   • DERECK (acute kidney injury) (HCC)   • Severe malnutrition (CMS/HCC)   • Vitamin D deficiency   • Hypophosphatemia       Urinary retention    Plan:    Continue Flomax and intermittent catheterization. He will be going to skilled rehab where they can continue his intermittent catheterization at the facility.      Rambo Nicholas MD    11/15/2021  12:18 EST

## 2021-11-15 NOTE — PROGRESS NOTES
"Daily progress note    Chief complaint  Doing better  No new complaints  Still unable to void with urinary retention  Denies any chest pain shortness of breath palpitation    History of present illness  71-year-old white male with no medical problems and no home medication brought to the emergency room by the family with generalized weakness for 1 month failure to thrive not eating drinking for several days to week.  Patient has no fever cough chest pain shortness of breath abdominal pain nausea vomiting diarrhea.  Patient looks cachectic and work-up in ER revealed acute kidney injury with severe dehydration and hypernatremia also found to have UTI admit for management.  Patient remained fully awake alert and oriented x3.     REVIEW OF SYSTEMS  Unremarkable except generalized weakness     PHYSICAL EXAM   Blood pressure 118/87, pulse 79, temperature 98.5 °F (36.9 °C), temperature source Oral, resp. rate 16, height 177.8 cm (70\"), weight 65.5 kg (144 lb 6.4 oz), SpO2 96 %.    GENERAL: Awake and alert, acutely ill-appearing cachectic, not distressed  HEENT:  Unremarkable  CV: regular rhythm, tachycardic rate, no murmur  RESPIRATORY: normal effort, CTA  ABDOMEN: soft, nontender bowel sounds positive  MUSCULOSKELETAL: no deformity, FROM, no calf swelling or tenderness  NEURO: alert, slurred speech, moves all extremities, follows commands  SKIN: warm, dry     LAB RESULTS  Lab Results (last 24 hours)     Procedure Component Value Units Date/Time    Renal Function Panel [[id number]]  (Abnormal) Collected: 11/15/21 0727    Specimen: Blood Updated: 11/15/21 0813     Glucose 97 mg/dL      BUN 23 mg/dL      Creatinine 1.63 mg/dL      Sodium 143 mmol/L      Potassium 4.7 mmol/L      Chloride 108 mmol/L      CO2 26.3 mmol/L      Calcium 8.9 mg/dL      Albumin 3.10 g/dL      Phosphorus 3.9 mg/dL      Anion Gap 8.7 mmol/L      BUN/Creatinine Ratio 14.1     eGFR Non African Amer 42 mL/min/1.73     Narrative:      GFR Normal " >60  Chronic Kidney Disease <60  Kidney Failure <15          Imaging Results (Last 24 Hours)     ** No results found for the last 24 hours. **                 ECG 12 Lead  Component   Ref Range & Units 11/8/21 1546   QT Interval   ms 351              HEART RATE= 107  bpm  RR Interval= 560  ms  WI Interval= 129  ms  P Horizontal Axis= -10  deg  P Front Axis= 78  deg  QRSD Interval= 71  ms  QT Interval= 351  ms  QRS Axis= -60  deg  T Wave Axis= 57  deg  - ABNORMAL ECG -  Sinus tachycardia  Biatrial enlargement  LAD, consider left anterior fascicular block  ST elevation, consider inferior injury  NO PRIOR TRACING AVAILABLE FOR COMPARISON             Current Facility-Administered Medications:   •  acetaminophen (TYLENOL) tablet 650 mg, 650 mg, Oral, Q4H PRN, Fernando Ga MD  •  aspirin chewable tablet 81 mg, 81 mg, Oral, Daily, Fernando Ga MD, 81 mg at 11/14/21 1728  •  cefTRIAXone (ROCEPHIN) 1 g in sodium chloride 0.9 % 100 mL IVPB-VTB, 1 g, Intravenous, Q24H, Fernando Ga MD, Last Rate: 200 mL/hr at 11/14/21 1728, 1 g at 11/14/21 1728  •  famotidine (PEPCID) tablet 20 mg, 20 mg, Oral, BID AC, Fernando Ga MD, 20 mg at 11/15/21 0838  •  megestrol (MEGACE) 40 MG/ML suspension 400 mg, 400 mg, Oral, Daily, Fernando Ga MD, 400 mg at 11/15/21 0838  •  metoprolol tartrate (LOPRESSOR) tablet 12.5 mg, 12.5 mg, Oral, Q12H, Fernando Ga MD, 12.5 mg at 11/15/21 0838  •  mirtazapine (REMERON) tablet 15 mg, 15 mg, Oral, Nightly, Fernando Ga MD, 15 mg at 11/14/21 2016  •  tamsulosin (FLOMAX) 24 hr capsule 0.4 mg, 0.4 mg, Oral, Daily, Rambo Nicholas MD, 0.4 mg at 11/15/21 0838  •  vitamin D (ERGOCALCIFEROL) capsule 50,000 Units, 50,000 Units, Oral, Q7 Days, Brian Farrar MD, 50,000 Units at 11/11/21 1744     ASSESSMENT  Acute kidney injury resolved  Acute UTI with sepsis  Severe dehydration  Urinary retention  Bilateral hydronephrosis  Hypernatremia  Hyperkalemia  Elevated troponin with no chest  pain  Elevated blood sugar with no history of diabetes  Failure to thrive  Gastroesophageal reflux disease    PLAN  CPM  Discontinue IVF  Continue IV antibiotics   Flomax  Continue in and out cath as needed  Voiding trial and urology following  Nephrology to follow patient  Stress ulcer DVT prophylaxis   Supportive care  PT/OT  Discussed with nursing staff  Subacute rehab once bed available    STEVEN HERMAN MD

## 2021-11-15 NOTE — PLAN OF CARE
Problem: Adult Inpatient Plan of Care  Goal: Plan of Care Review  Outcome: Ongoing, Progressing  Flowsheets (Taken 11/15/2021 1429)  Progress: improving  Plan of Care Reviewed With: patient  Outcome Summary: Patient has been pleasant and cooperative during shift. No complaints of pain, nausea or SOA. Patient assist x1 to ambulate. Turns self in bed. Urology says continue to intermittent straight cath and this can be continued at SNF. Plan D/C to [facility]. D/C [facility]. OK to D/C per renal. Will continue to monitor and assist patient as needed.  Goal: Patient-Specific Goal (Individualized)  Outcome: Ongoing, Progressing  Goal: Absence of Hospital-Acquired Illness or Injury  Outcome: Ongoing, Progressing  Intervention: Identify and Manage Fall Risk  Recent Flowsheet Documentation  Taken 11/15/2021 1210 by Cruzito Ayala RN  Safety Promotion/Fall Prevention:   assistive device/personal items within reach   fall prevention program maintained   nonskid shoes/slippers when out of bed   safety round/check completed  Taken 11/15/2021 1000 by Cruzito Ayala RN  Safety Promotion/Fall Prevention:   assistive device/personal items within reach   fall prevention program maintained   nonskid shoes/slippers when out of bed   safety round/check completed  Taken 11/15/2021 0841 by Cruzito Ayala RN  Safety Promotion/Fall Prevention:   assistive device/personal items within reach   fall prevention program maintained   nonskid shoes/slippers when out of bed   safety round/check completed  Intervention: Prevent Skin Injury  Recent Flowsheet Documentation  Taken 11/15/2021 1210 by Cruzito Ayala RN  Body Position:   supine   position changed independently  Taken 11/15/2021 1000 by Cruzito Ayala RN  Body Position: supine  Taken 11/15/2021 0841 by Cruzito Ayala RN  Body Position:   supine   position changed independently  Skin Protection:   tubing/devices free from skin contact   incontinence pads utilized  Goal:  Optimal Comfort and Wellbeing  Outcome: Ongoing, Progressing  Goal: Readiness for Transition of Care  Outcome: Ongoing, Progressing     Problem: Fall Injury Risk  Goal: Absence of Fall and Fall-Related Injury  Outcome: Ongoing, Progressing  Intervention: Promote Injury-Free Environment  Recent Flowsheet Documentation  Taken 11/15/2021 1210 by Cruzito Ayala RN  Safety Promotion/Fall Prevention:   assistive device/personal items within reach   fall prevention program maintained   nonskid shoes/slippers when out of bed   safety round/check completed  Taken 11/15/2021 1000 by Cruzito Ayala RN  Safety Promotion/Fall Prevention:   assistive device/personal items within reach   fall prevention program maintained   nonskid shoes/slippers when out of bed   safety round/check completed  Taken 11/15/2021 0841 by Cruzito Ayala RN  Safety Promotion/Fall Prevention:   assistive device/personal items within reach   fall prevention program maintained   nonskid shoes/slippers when out of bed   safety round/check completed     Problem: Electrolyte Imbalance (Acute Kidney Injury/Impairment)  Goal: Serum Electrolyte Balance  Outcome: Ongoing, Progressing     Problem: Fluid Imbalance (Acute Kidney Injury/Impairment)  Goal: Optimal Fluid Balance  Outcome: Ongoing, Progressing     Problem: Hematologic Alteration (Acute Kidney Injury/Impairment)  Goal: Hemoglobin, Hematocrit and Platelets Within Normal Range  Outcome: Ongoing, Progressing     Problem: Oral Intake Inadequate (Acute Kidney Injury/Impairment)  Goal: Optimal Nutrition Intake  Outcome: Ongoing, Progressing     Problem: Renal Function Impairment (Acute Kidney Injury/Impairment)  Goal: Effective Renal Function  Outcome: Ongoing, Progressing     Problem: Skin Injury Risk Increased  Goal: Skin Health and Integrity  Outcome: Ongoing, Progressing  Intervention: Optimize Skin Protection  Recent Flowsheet Documentation  Taken 11/15/2021 1210 by Cruzito Ayala RN  Head of  Bed (HOB): HOB at 30 degrees  Taken 11/15/2021 1000 by Cruzito Ayala RN  Head of Bed (HOB): HOB at 30 degrees  Taken 11/15/2021 0841 by Cruzito Ayala, RN  Pressure Reduction Techniques:   frequent weight shift encouraged   weight shift assistance provided  Head of Bed (HOB): HOB at 30 degrees  Pressure Reduction Devices: pressure-redistributing mattress utilized  Skin Protection:   tubing/devices free from skin contact   incontinence pads utilized     Problem: Malnutrition  Goal: Improved Nutritional Intake  Outcome: Ongoing, Progressing   Goal Outcome Evaluation:  Plan of Care Reviewed With: patient        Progress: improving  Outcome Summary: Patient has been pleasant and cooperative during shift. No complaints of pain, nausea or SOA. Patient assist x1 to ambulate. Turns self in bed. Urology says continue to intermittent straight cath and this can be continued at SNF. Plan D/C to [facility]. D/C [facility]. OK to D/C per renal. Will continue to monitor and assist patient as needed.

## 2021-11-15 NOTE — NURSING NOTE
Patient was bladder scanned at 1730 for 730mL. Patient was straight cathed at 1800 for 1300mL. Urine very dark compared to previous straight cath on Sunday 11/14. Dark tea/ dark trevor colored. Call placed to urology and Dr. Estrella was made aware. He said that Urology will see him tomorrow and decide what they want to do.

## 2021-11-16 LAB
ALBUMIN SERPL-MCNC: 3 G/DL (ref 3.5–5.2)
ANION GAP SERPL CALCULATED.3IONS-SCNC: 13 MMOL/L (ref 5–15)
BUN SERPL-MCNC: 20 MG/DL (ref 8–23)
BUN/CREAT SERPL: 16.1 (ref 7–25)
CALCIUM SPEC-SCNC: 8.5 MG/DL (ref 8.6–10.5)
CHLORIDE SERPL-SCNC: 106 MMOL/L (ref 98–107)
CO2 SERPL-SCNC: 22 MMOL/L (ref 22–29)
CREAT SERPL-MCNC: 1.24 MG/DL (ref 0.76–1.27)
GFR SERPL CREATININE-BSD FRML MDRD: 58 ML/MIN/1.73
GLUCOSE SERPL-MCNC: 145 MG/DL (ref 65–99)
PHOSPHATE SERPL-MCNC: 2.8 MG/DL (ref 2.5–4.5)
POTASSIUM SERPL-SCNC: 3.9 MMOL/L (ref 3.5–5.2)
SODIUM SERPL-SCNC: 141 MMOL/L (ref 136–145)

## 2021-11-16 PROCEDURE — 97535 SELF CARE MNGMENT TRAINING: CPT

## 2021-11-16 PROCEDURE — 80069 RENAL FUNCTION PANEL: CPT | Performed by: INTERNAL MEDICINE

## 2021-11-16 RX ORDER — TAMSULOSIN HYDROCHLORIDE 0.4 MG/1
0.4 CAPSULE ORAL 2 TIMES DAILY
Status: DISCONTINUED | OUTPATIENT
Start: 2021-11-16 | End: 2021-11-17 | Stop reason: HOSPADM

## 2021-11-16 RX ADMIN — FAMOTIDINE 20 MG: 20 TABLET, FILM COATED ORAL at 18:07

## 2021-11-16 RX ADMIN — METOPROLOL TARTRATE 12.5 MG: 25 TABLET, FILM COATED ORAL at 08:40

## 2021-11-16 RX ADMIN — MIRTAZAPINE 15 MG: 15 TABLET, FILM COATED ORAL at 21:24

## 2021-11-16 RX ADMIN — FAMOTIDINE 20 MG: 20 TABLET, FILM COATED ORAL at 05:48

## 2021-11-16 RX ADMIN — TAMSULOSIN HYDROCHLORIDE 0.4 MG: 0.4 CAPSULE ORAL at 21:24

## 2021-11-16 RX ADMIN — METOPROLOL TARTRATE 12.5 MG: 25 TABLET, FILM COATED ORAL at 21:24

## 2021-11-16 RX ADMIN — TAMSULOSIN HYDROCHLORIDE 0.4 MG: 0.4 CAPSULE ORAL at 08:41

## 2021-11-16 RX ADMIN — ASPIRIN 81 MG: 81 TABLET, CHEWABLE ORAL at 18:07

## 2021-11-16 NOTE — PLAN OF CARE
Problem: Adult Inpatient Plan of Care  Goal: Plan of Care Review  Outcome: Ongoing, Progressing  Flowsheets (Taken 11/16/2021 1750)  Progress: improving  Plan of Care Reviewed With: patient  Outcome Summary: denies pain, denies nausea, emiliano meals, vitals stable, straight cath x1.  Goal: Patient-Specific Goal (Individualized)  Outcome: Ongoing, Progressing  Goal: Absence of Hospital-Acquired Illness or Injury  Outcome: Ongoing, Progressing  Intervention: Identify and Manage Fall Risk  Recent Flowsheet Documentation  Taken 11/16/2021 1602 by Eliana Sue RN  Safety Promotion/Fall Prevention:   nonskid shoes/slippers when out of bed   fall prevention program maintained  Taken 11/16/2021 1426 by Eliana Sue RN  Safety Promotion/Fall Prevention:   nonskid shoes/slippers when out of bed   fall prevention program maintained  Taken 11/16/2021 1215 by Eliana Sue RN  Safety Promotion/Fall Prevention:   nonskid shoes/slippers when out of bed   fall prevention program maintained  Taken 11/16/2021 1018 by Eliana Sue RN  Safety Promotion/Fall Prevention:   nonskid shoes/slippers when out of bed   fall prevention program maintained  Taken 11/16/2021 0840 by Eliana Sue RN  Safety Promotion/Fall Prevention:   nonskid shoes/slippers when out of bed   fall prevention program maintained  Goal: Optimal Comfort and Wellbeing  Outcome: Ongoing, Progressing  Goal: Readiness for Transition of Care  Outcome: Ongoing, Progressing     Problem: Fall Injury Risk  Goal: Absence of Fall and Fall-Related Injury  Outcome: Ongoing, Progressing  Intervention: Promote Injury-Free Environment  Recent Flowsheet Documentation  Taken 11/16/2021 1602 by Eliana Sue RN  Safety Promotion/Fall Prevention:   nonskid shoes/slippers when out of bed   fall prevention program maintained  Taken 11/16/2021 1426 by Eliana Sue RN  Safety Promotion/Fall Prevention:   nonskid shoes/slippers when out of bed   fall prevention program  maintained  Taken 11/16/2021 1215 by Eliana Sue RN  Safety Promotion/Fall Prevention:   nonskid shoes/slippers when out of bed   fall prevention program maintained  Taken 11/16/2021 1018 by Eliana Sue RN  Safety Promotion/Fall Prevention:   nonskid shoes/slippers when out of bed   fall prevention program maintained  Taken 11/16/2021 0840 by Eliana Sue RN  Safety Promotion/Fall Prevention:   nonskid shoes/slippers when out of bed   fall prevention program maintained     Problem: Electrolyte Imbalance (Acute Kidney Injury/Impairment)  Goal: Serum Electrolyte Balance  Outcome: Ongoing, Progressing     Problem: Fluid Imbalance (Acute Kidney Injury/Impairment)  Goal: Optimal Fluid Balance  Outcome: Ongoing, Progressing     Problem: Hematologic Alteration (Acute Kidney Injury/Impairment)  Goal: Hemoglobin, Hematocrit and Platelets Within Normal Range  Outcome: Ongoing, Progressing     Problem: Oral Intake Inadequate (Acute Kidney Injury/Impairment)  Goal: Optimal Nutrition Intake  Outcome: Ongoing, Progressing     Problem: Renal Function Impairment (Acute Kidney Injury/Impairment)  Goal: Effective Renal Function  Outcome: Ongoing, Progressing     Problem: Skin Injury Risk Increased  Goal: Skin Health and Integrity  Outcome: Ongoing, Progressing     Problem: Malnutrition  Goal: Improved Nutritional Intake  Outcome: Ongoing, Progressing   Goal Outcome Evaluation:  Plan of Care Reviewed With: patient        Progress: improving  Outcome Summary: denies pain, denies nausea, emiliano meals, vitals stable, straight cath x1.

## 2021-11-16 NOTE — PROGRESS NOTES
"   LOS: 8 days     Chief Complaint/ Reason for encounter: Acute kidney injury, severe hypernatremia  Chief Complaint   Patient presents with   • Weakness - Generalized         Subjective   Feels better today, eating and drinking little better  Denies any shortness of breath or chest pain  No fevers or chills  Improved appetite with no nausea or vomiting  No edema, improved urine output. Urine very dark, cola colored per RN    11/11 continues to improve.  Much more awake and alert.  Denies any fevers chills nausea vomiting shortness of breath chest pain or edema  White placed yesterday with over 2 L urine output, urology following and added Flomax    11/12 doing little better today.  More awake and alert, appetite slowly improved  White in place with plans to perform a voiding trial today    11/13 no new complaints, in good spirits.  Eating and drinking well, still not getting out of bed or moving around much.  Denies shortness of breath chest pain fevers chills nausea or vomiting    11/14 not much new today feeling better, slow improvement    11/15 no new complaints or events today. He states he is eating and drinking well    11/16 looks and feels well, appetite improved.  Labs improved.  Still requiring in and out catheterizations as needed      Medical history reviewed:  Weakness - Generalized        Subjective    History taken from: Patient and chart    Vital Signs  Temp:  [98.1 °F (36.7 °C)-98.6 °F (37 °C)] 98.3 °F (36.8 °C)  Heart Rate:  [] 104  Resp:  [18] 18  BP: ()/(59-88) 128/88       Wt Readings from Last 1 Encounters:   11/12/21 0535 65.5 kg (144 lb 6.4 oz)   11/11/21 0500 70.5 kg (155 lb 6.8 oz)   11/10/21 0307 65.1 kg (143 lb 8.3 oz)   11/08/21 2115 60.8 kg (134 lb 0.6 oz)   11/08/21 1504 60.3 kg (133 lb)       Objective:  Vital signs: (most recent): Blood pressure 128/88, pulse 104, temperature 98.3 °F (36.8 °C), temperature source Oral, resp. rate 18, height 177.8 cm (70\"), weight 65.5 kg " (144 lb 6.4 oz), SpO2 96 %.              Objective:  General Appearance: Somewhat malnourished, comfortable, chronically ill-appearing, in no acute distress and not in pain.  Awake, alert, oriented  HEENT: Mucous membranes moist, no injury, oropharynx clear  Lungs:  Normal effort and normal respiratory rate.  Breath sounds clear to auscultation.  No  respiratory distress.  No rales, decreased breath sounds or rhonchi.    Heart: Normal rate.  Regular rhythm.  S1 normal.  No murmur.   Abdomen: Abdomen is soft.  Bowel sounds are normal, no abdominal tenderness.  There is no rebound or guarding  Extremities: Normal range of motion. No significant edema of bilateral lower extremities, distal pulses intact  Neurological: No focal motor or sensory deficits, pupils reactive  Skin:  Warm and dry.  No rash or cyanosis.       Results Review:    Intake/Output:     Intake/Output Summary (Last 24 hours) at 11/16/2021 1416  Last data filed at 11/15/2021 1800  Gross per 24 hour   Intake --   Output 1300 ml   Net -1300 ml         DATA:  Radiology and Labs:  The following labs independently reviewed by me. Additional labs ordered for tomorrow a.m.  Interval notes, chart personally reviewed by me.   Old records independently reviewed showing no prior known history of chronic kidney disease  The following radiologic studies independently viewed by me, findings renal ultrasound shows marked right hydronephrosis, moderate left hydronephrosis and distended urinary bladder, moderately enlarged prostate  New problems include hydronephrosis  Discussed with patient and his nurse at bedside    Risk/ complexity of medical care/ medical decision making moderate risk    Labs:   Recent Results (from the past 24 hour(s))   Renal Function Panel    Collection Time: 11/16/21  9:10 AM    Specimen: Blood   Result Value Ref Range    Glucose 145 (H) 65 - 99 mg/dL    BUN 20 8 - 23 mg/dL    Creatinine 1.24 0.76 - 1.27 mg/dL    Sodium 141 136 - 145 mmol/L     Potassium 3.9 3.5 - 5.2 mmol/L    Chloride 106 98 - 107 mmol/L    CO2 22.0 22.0 - 29.0 mmol/L    Calcium 8.5 (L) 8.6 - 10.5 mg/dL    Albumin 3.00 (L) 3.50 - 5.20 g/dL    Phosphorus 2.8 2.5 - 4.5 mg/dL    Anion Gap 13.0 5.0 - 15.0 mmol/L    BUN/Creatinine Ratio 16.1 7.0 - 25.0    eGFR Non African Amer 58 (L) >60 mL/min/1.73       Radiology:  Imaging Results (Last 24 Hours)     ** No results found for the last 24 hours. **             Medications have been reviewed:  Current Facility-Administered Medications   Medication Dose Route Frequency Provider Last Rate Last Admin   • acetaminophen (TYLENOL) tablet 650 mg  650 mg Oral Q4H PRN Fernando Ga MD       • aspirin chewable tablet 81 mg  81 mg Oral Daily Fernando Ga MD   81 mg at 11/15/21 1728   • famotidine (PEPCID) tablet 20 mg  20 mg Oral BID AC Fernando Ga MD   20 mg at 11/16/21 0548   • metoprolol tartrate (LOPRESSOR) tablet 12.5 mg  12.5 mg Oral Q12H Fernando Ga MD   12.5 mg at 11/16/21 0840   • mirtazapine (REMERON) tablet 15 mg  15 mg Oral Nightly Fernando Ga MD   15 mg at 11/15/21 2018   • tamsulosin (FLOMAX) 24 hr capsule 0.4 mg  0.4 mg Oral BID Rambo Nicholas MD       • vitamin D (ERGOCALCIFEROL) capsule 50,000 Units  50,000 Units Oral Q7 Days Brian Farrar MD   50,000 Units at 11/11/21 1744       ASSESSMENT:  severe renal failure secondary to profound dehydration, improved with fluids  Profound hypernatremia, still high but much improved  Lactic acidosis from hypotension  Hypotension related to severe dehydration, mildly better  Elevated LFTs  Hypercalcemia secondary to dehydration  Elevated hemoglobin related to hemoconcentration/dehydration  Cachexia and malnutrition  History of autism and cognitive deficits  Hyperphosphatemia related to renal failure  Mild CK elevation, possibly some element of mild rhabdo  New, vitamin D deficiency  hypophosphatemia        PLAN:  Creatinine improved today  encourage oral fluid and nutritional  intake, on Megace  Intermittent catheterization as needed per urology, to continue at rehab facility.  On Flomax  Maintain on vitamin D   Otherwise stable for discharge anytime from a renal standpoint, await rehab bed        Brian Farrar MD   [facility]   Office phone number: [phone]  Answering service phone number: [phone]    11/16/21  14:16 EST    Dictation performed using Dragon dictation software

## 2021-11-16 NOTE — THERAPY TREATMENT NOTE
Patient Name: Pedro Peck  : 1950    MRN: [id number]                              Today's Date: 2021       Admit Date: 2021    Visit Dx:     ICD-10-CM ICD-9-CM   1. DERECK (acute kidney injury) (HCC)  N17.9 584.9   2. Hypotension, unspecified hypotension type  I95.9 458.9   3. Sepsis, due to unspecified organism, unspecified whether acute organ dysfunction present ([facility])  A41.9 038.9     995.91   4. Hypernatremia  E87.0 276.0   5. Lactic acidosis  E87.2 276.2   6. Elevated troponin  R77.8 790.6     Patient Active Problem List   Diagnosis   • DERECK (acute kidney injury) ([facility])   • Severe malnutrition (CMS/HCC)   • Vitamin D deficiency   • Hypophosphatemia     Past Medical History:   Diagnosis Date   • Autism      History reviewed. No pertinent surgical history.   General Information     Row Name 21 [facility] 21       OT Time and Intention    Document Type --  -RD therapy note (daily note)  -RD    Mode of Treatment --  -RD occupational therapy  -RD    Row Name 217 21 09       General Information    Patient Profile Reviewed --  -RD yes  -RD    Existing Precautions/Restrictions --  -RD fall  -RD    Row Name 217 21 09       Cognition    Orientation Status (Cognition) --  -RD oriented x 3  -RD    Row Name 21 21       Safety Issues, Functional Mobility    Impairments Affecting Function (Mobility) --  -RD balance; cognition; coordination; endurance/activity tolerance; strength; postural/trunk control; motor control; motor planning  -RD          User Key  (r) = Recorded By, (t) = Taken By, (c) = Cosigned By    Initials Name Provider Type    RD Tonja Najera, OT Occupational Therapist                 Mobility/ADL's     Row Name 21          Bed Mobility    Bed Mobility supine-sit  -RD     Supine-Sit [place] (Bed Mobility) standby assist; verbal cues  -RD     Assistive Device (Bed Mobility) bed rails; head of bed elevated   -     Row Name 11/16/21 0916          Transfers    Transfers sit-stand transfer; toilet transfer  -RD     Sit-Stand [place] (Transfers) contact guard; minimum assist (75% patient effort); verbal cues; nonverbal cues (demo/gesture)  -RD     [place] Level (Toilet Transfer) minimum assist (75% patient effort); verbal cues; nonverbal cues (demo/gesture)  -RD     Assistive Device (Toilet Transfer) walker, front-wheeled; grab bars/safety frame  -RD     Row Name 11/16/21 0916          Sit-Stand Transfer    Assistive Device (Sit-Stand Transfers) walker, front-wheeled  -     Row Name 11/16/21 0916          Toilet Transfer    Type (Toilet Transfer) sit-stand; stand-sit  -     Row Name 11/16/21 0916          Functional Mobility    Functional Mobility- Ind. Level minimum assist (75% patient effort); verbal cues required; nonverbal cues required (demo/gesture)  -RD     Functional Mobility- Device rolling walker  -RD     Functional Mobility-Distance (Feet) 20  -RD     Functional Mobility- Comment pt ambulates from EOB > chair and later from chair > bathroom using RW w/ min A- one LOB w/ min A required to correct  -     Row Name 11/16/21 0916          Activities of Daily Living    BADL Assessment/Intervention lower body dressing; toileting; grooming  -     Row Name 11/16/21 0916          Lower Body Dressing Assessment/Training    [place] Level (Lower Body Dressing) lower body dressing skills; doff; don; socks; moderate assist (50% patient effort); verbal cues  -RD     Position (Lower Body Dressing) supported sitting  -     Row Name 11/16/21 0916          Grooming Assessment/Training    [place] Level (Grooming) grooming skills; oral care regimen; wash face, hands; set up; supervision  -RD     Position (Grooming) supported sitting  -RD     Row Name 11/16/21 0916          Toileting Assessment/Training    [place] Level (Toileting) toileting skills; adjust/manage clothing; change pad/brief; perform  perineal hygiene; moderate assist (50% patient effort); maximum assist (25% patient effort); verbal cues  -RD     Position (Toileting) supported sitting; supported standing  -RD           User Key  (r) = Recorded By, (t) = Taken By, (c) = Cosigned By    Initials Name Provider Type    Tonja Conklin OT Occupational Therapist               Obj/Interventions     Row Name 11/16/21 1049          Balance    Balance Assessment sitting static balance; standing static balance  -RD     Static Sitting Balance WFL; sitting, edge of bed  -RD     Static Standing Balance mild impairment; standing; supported  -RD           User Key  (r) = Recorded By, (t) = Taken By, (c) = Cosigned By    Initials Name Provider Type    Tonja Conklin, OT Occupational Therapist               Goals/Plan    No documentation.                Clinical Impression     Row Name 11/16/21 1050          Pain Scale: Numbers Pre/Post-Treatment    Pretreatment Pain Rating 0/10 - no pain  -RD     Posttreatment Pain Rating 0/10 - no pain  -RD     Row Name 11/16/21 1050          Plan of Care Review    Plan of Care Reviewed With patient  -RD     Progress improving  -RD     Outcome Summary Pt participates in OT well this date. Pt completes bed mobility w/ SBA and completes STS transfer w/ CGA and then ambulates a few feet to chair using RW w/ CGA. Pt later ambulates to bathroom using RW w/ min A- one LOB requiring min A to correct. Pt completes toileting w/ mod/max A and LB dressing w/ min/mod A. Pt completes grooming w/ s/u. Pt very impulsive and unsafe at times- recommend d/c to SNF as pt is not currently safe to return home alone. OT wore standard mask, gloves, glasses, and performed thorough hand hygiene before and after session.  -RD     Row Name 11/16/21 1050          Therapy Plan Review/Discharge Plan (OT)    Anticipated Discharge Disposition (OT) skilled nursing facility  -RD     Row Name 11/16/21 1050          Vital Signs    O2 Delivery Pre  Treatment room air  -RD     O2 Delivery Intra Treatment room air  -RD     O2 Delivery Post Treatment room air  -RD     Row Name 11/16/21 1050          Positioning and Restraints    Pre-Treatment Position in bed  -RD     Post Treatment Position bathroom  -RD     Bathroom sitting; call light within reach; encouraged to call for assist; notified nsg  notified RA pt on toilet as pt needing extra time on commode  -RD           User Key  (r) = Recorded By, (t) = Taken By, (c) = Cosigned By    Initials Name Provider Type    Tonja Conklin, OT Occupational Therapist               Outcome Measures     Row Name 11/16/21 1050          How much help from another is currently needed...    Putting on and taking off regular lower body clothing? 3  -RD     Bathing (including washing, rinsing, and drying) 2  -RD     Toileting (which includes using toilet bed pan or urinal) 2  -RD     Putting on and taking off regular upper body clothing 3  -RD     Taking care of personal grooming (such as brushing teeth) 3  -RD     Eating meals 3  -RD     AM-PAC 6 Clicks Score (OT) 16  -RD     Row Name 11/16/21 0000          How much help from another person do you currently need...    Turning from your back to your side while in flat bed without using bedrails? 4  -MS     Moving from lying on back to sitting on the side of a flat bed without bedrails? 4  -MS     Moving to and from a bed to a chair (including a wheelchair)? 3  -MS     Standing up from a chair using your arms (e.g., wheelchair, bedside chair)? 3  -MS     Climbing 3-5 steps with a railing? 2  -MS     To walk in hospital room? 2  -MS     AM-PAC 6 Clicks Score (PT) 18  -MS     Row Name 11/16/21 1050          Functional Assessment    Outcome Measure Options AM-PAC 6 Clicks Daily Activity (OT)  -RD           User Key  (r) = Recorded By, (t) = Taken By, (c) = Cosigned By    Initials Name Provider Type    Nelda Sims, RN Registered Nurse    Tonja Conklin, OT  Occupational Therapist                Occupational Therapy Education                 Title: PT OT SLP Therapies (Done)     Topic: Occupational Therapy (Done)     Point: ADL training (Done)     Description:   Instruct learner(s) on proper safety adaptation and remediation techniques during self care or transfers.   Instruct in proper use of assistive devices.              Learning Progress Summary           Patient Acceptance, E,TB,D, VU,NR by  at 11/15/2021 1635    Acceptance, E,TB, VU,NR by  at 11/13/2021 0638    Acceptance, E, VU by  at 11/10/2021 1253    Comment: The role of OT                   Point: Home exercise program (Done)     Description:   Instruct learner(s) on appropriate technique for monitoring, assisting and/or progressing therapeutic exercises/activities.              Learning Progress Summary           Patient Acceptance, E,TB,D, VU,NR by  at 11/15/2021 1635    Acceptance, E,TB, VU,NR by  at 11/13/2021 0638                   Point: Precautions (Done)     Description:   Instruct learner(s) on prescribed precautions during self-care and functional transfers.              Learning Progress Summary           Patient Acceptance, E,TB,D, VU,NR by  at 11/15/2021 1635    Acceptance, E,TB, VU,NR by  at 11/13/2021 0638                   Point: Body mechanics (Done)     Description:   Instruct learner(s) on proper positioning and spine alignment during self-care, functional mobility activities and/or exercises.              Learning Progress Summary           Patient Acceptance, E,TB,D, VU,NR by  at 11/15/2021 1635    Acceptance, E,TB, VU,NR by  at 11/13/2021 0638                               User Key     Initials Effective Dates Name Provider Type Discipline     06/16/21 -  Ashwini Hancock PT Physical Therapist PT     01/05/21 -  Carli Lemus OT Occupational Therapist OT     03/10/21 -  Daniela Garcia, RN Registered Nurse Nurse              OT Recommendation and Plan      Plan of Care Review  Plan of Care Reviewed With: patient  Progress: improving  Outcome Summary: Pt participates in OT well this date. Pt completes bed mobility w/ SBA and completes STS transfer w/ CGA and then ambulates a few feet to chair using RW w/ CGA. Pt later ambulates to bathroom using RW w/ min A- one LOB requiring min A to correct. Pt completes toileting w/ mod/max A and LB dressing w/ min/mod A. Pt completes grooming w/ s/u. Pt very impulsive and unsafe at times- recommend d/c to SNF as pt is not currently safe to return home alone. OT wore standard mask, gloves, glasses, and performed thorough hand hygiene before and after session.     Time Calculation:    Time Calculation- OT     Row Name 11/16/21 1128             Time Calculation- OT    OT Start Time 0855  -RD      OT Stop Time 0921  -RD      OT Time Calculation (min) 26 min  -RD      Total Timed Code Minutes- OT 26 minute(s)  -RD      OT Received On 11/16/21  -RD      OT - Next Appointment 11/17/21  -RD              Timed Charges    20556 - OT Self Care/Mgmt Minutes 26  -RD              Total Minutes    Timed Charges Total Minutes 26  -RD       Total Minutes 26  -RD            User Key  (r) = Recorded By, (t) = Taken By, (c) = Cosigned By    Initials Name Provider Type    Tonja Conklin OT Occupational Therapist              Therapy Charges for Today     Code Description Service Date Service Provider Modifiers Qty    [id number] HC OT SELF CARE/MGMT/TRAIN EA 15 MIN 11/16/2021 Tonja Najera OT GO 2               Tonja Najera OT  11/16/2021

## 2021-11-16 NOTE — PROGRESS NOTES
"Daily progress note    Chief complaint  Doing pain  No new complaints  Denies any chest pain shortness of breath palpitation    History of present illness  71-year-old white male with no medical problems and no home medication brought to the emergency room by the family with generalized weakness for 1 month failure to thrive not eating drinking for several days to week.  Patient has no fever cough chest pain shortness of breath abdominal pain nausea vomiting diarrhea.  Patient looks cachectic and work-up in ER revealed acute kidney injury with severe dehydration and hypernatremia also found to have UTI admit for management.  Patient remained fully awake alert and oriented x3.     REVIEW OF SYSTEMS  Unremarkable except generalized weakness     PHYSICAL EXAM   Blood pressure 128/88, pulse 104, temperature 98.3 °F (36.8 °C), temperature source Oral, resp. rate 18, height 177.8 cm (70\"), weight 65.5 kg (144 lb 6.4 oz), SpO2 96 %.    GENERAL: Awake and alert, acutely ill-appearing cachectic, not distressed  HEENT:  Unremarkable  CV: regular rhythm, tachycardic rate, no murmur  RESPIRATORY: normal effort, CTA  ABDOMEN: soft, nontender bowel sounds positive  MUSCULOSKELETAL: no deformity, FROM, no calf swelling or tenderness  NEURO: alert, slurred speech, moves all extremities, follows commands  SKIN: warm, dry     LAB RESULTS  Lab Results (last 24 hours)     Procedure Component Value Units Date/Time    Renal Function Panel [[id number]]  (Abnormal) Collected: 11/16/21 0910    Specimen: Blood Updated: 11/16/21 1002     Glucose 145 mg/dL      BUN 20 mg/dL      Creatinine 1.24 mg/dL      Sodium 141 mmol/L      Potassium 3.9 mmol/L      Chloride 106 mmol/L      CO2 22.0 mmol/L      Calcium 8.5 mg/dL      Albumin 3.00 g/dL      Phosphorus 2.8 mg/dL      Anion Gap 13.0 mmol/L      BUN/Creatinine Ratio 16.1     eGFR Non African Amer 58 mL/min/1.73     Narrative:      GFR Normal >60  Chronic Kidney Disease <60  Kidney Failure <15   "        Imaging Results (Last 24 Hours)     ** No results found for the last 24 hours. **                 ECG 12 Lead  Component   Ref Range & Units 11/8/21 1546   QT Interval   ms 351              HEART RATE= 107  bpm  RR Interval= 560  ms  [place] Interval= 129  ms  P Horizontal Axis= -10  deg  P Front Axis= 78  deg  QRSD Interval= 71  ms  QT Interval= 351  ms  QRS Axis= -60  deg  T Wave Axis= 57  deg  - ABNORMAL ECG -  Sinus tachycardia  Biatrial enlargement  LAD, consider left anterior fascicular block  ST elevation, consider inferior injury  NO PRIOR TRACING AVAILABLE FOR COMPARISON             Current Facility-Administered Medications:   •  acetaminophen (TYLENOL) tablet 650 mg, 650 mg, Oral, Q4H PRN, Fernando Ga MD  •  aspirin chewable tablet 81 mg, 81 mg, Oral, Daily, Fernando Ga MD, 81 mg at 11/15/21 1728  •  famotidine (PEPCID) tablet 20 mg, 20 mg, Oral, BID AC, Fernando Ga MD, 20 mg at 11/16/21 0548  •  metoprolol tartrate (LOPRESSOR) tablet 12.5 mg, 12.5 mg, Oral, Q12H, Fernando Ga MD, 12.5 mg at 11/16/21 0840  •  mirtazapine (REMERON) tablet 15 mg, 15 mg, Oral, Nightly, Fernando Ga MD, 15 mg at 11/15/21 2018  •  tamsulosin (FLOMAX) 24 hr capsule 0.4 mg, 0.4 mg, Oral, BID, Rambo Nicholas MD  •  vitamin D (ERGOCALCIFEROL) capsule 50,000 Units, 50,000 Units, Oral, Q7 Days, Brian Farrar MD, 50,000 Units at 11/11/21 1744     ASSESSMENT  Acute kidney injury resolved  Acute UTI with sepsis  Severe dehydration  Urinary retention  Bilateral hydronephrosis  Hypernatremia  Hyperkalemia  Elevated troponin with no chest pain  Elevated blood sugar with no history of diabetes  Failure to thrive  Gastroesophageal reflux disease    PLAN  CPM  Discontinue IVF  Antibiotics completed  Flomax  Continue in and out cath as needed  Voiding trial and urology following  Nephrology to follow patient  Stress ulcer DVT prophylaxis   Supportive care  PT/OT  Discussed with nursing staff  Subacute rehab once bed  available    STEVENFLORIDALMA HERMAN MD

## 2021-11-16 NOTE — PLAN OF CARE
Goal Outcome Evaluation:  Plan of Care Reviewed With: patient        Progress: improving  Outcome Summary: Pt participates in OT well this date. Pt completes bed mobility w/ SBA and completes STS transfer w/ CGA and then ambulates a few feet to chair using RW w/ CGA. Pt later ambulates to bathroom using RW w/ min A- one LOB requiring min A to correct. Pt completes toileting w/ mod/max A and LB dressing w/ min/mod A. Pt completes grooming w/ s/u. Pt very impulsive and unsafe at times- recommend d/c to SNF as pt is not currently safe to return home alone. OT wore standard mask, gloves, glasses, and performed thorough hand hygiene before and after session.

## 2021-11-17 VITALS
DIASTOLIC BLOOD PRESSURE: 62 MMHG | SYSTOLIC BLOOD PRESSURE: 110 MMHG | HEIGHT: 70 IN | HEART RATE: 79 BPM | OXYGEN SATURATION: 96 % | WEIGHT: 144.4 LBS | TEMPERATURE: 99.3 F | RESPIRATION RATE: 16 BRPM | BODY MASS INDEX: 20.67 KG/M2

## 2021-11-17 LAB
ALBUMIN SERPL-MCNC: 2.9 G/DL (ref 3.5–5.2)
ANION GAP SERPL CALCULATED.3IONS-SCNC: 7.7 MMOL/L (ref 5–15)
BASOPHILS # BLD AUTO: 0.03 10*3/MM3 (ref 0–0.2)
BASOPHILS NFR BLD AUTO: 0.4 % (ref 0–1.5)
BUN SERPL-MCNC: 16 MG/DL (ref 8–23)
BUN/CREAT SERPL: 14.2 (ref 7–25)
CALCIUM SPEC-SCNC: 8.5 MG/DL (ref 8.6–10.5)
CHLORIDE SERPL-SCNC: 112 MMOL/L (ref 98–107)
CO2 SERPL-SCNC: 21.3 MMOL/L (ref 22–29)
CREAT SERPL-MCNC: 1.13 MG/DL (ref 0.76–1.27)
DEPRECATED RDW RBC AUTO: 48.1 FL (ref 37–54)
EOSINOPHIL # BLD AUTO: 0.2 10*3/MM3 (ref 0–0.4)
EOSINOPHIL NFR BLD AUTO: 2.9 % (ref 0.3–6.2)
ERYTHROCYTE [DISTWIDTH] IN BLOOD BY AUTOMATED COUNT: 14.5 % (ref 12.3–15.4)
GFR SERPL CREATININE-BSD FRML MDRD: 64 ML/MIN/1.73
GLUCOSE SERPL-MCNC: 89 MG/DL (ref 65–99)
HCT VFR BLD AUTO: 36.7 % (ref 37.5–51)
HGB BLD-MCNC: 13.2 G/DL (ref 13–17.7)
IMM GRANULOCYTES # BLD AUTO: 0.06 10*3/MM3 (ref 0–0.05)
IMM GRANULOCYTES NFR BLD AUTO: 0.9 % (ref 0–0.5)
LYMPHOCYTES # BLD AUTO: 1.62 10*3/MM3 (ref 0.7–3.1)
LYMPHOCYTES NFR BLD AUTO: 23.8 % (ref 19.6–45.3)
MCH RBC QN AUTO: 32.8 PG (ref 26.6–33)
MCHC RBC AUTO-ENTMCNC: 36 G/DL (ref 31.5–35.7)
MCV RBC AUTO: 91.3 FL (ref 79–97)
MONOCYTES # BLD AUTO: 0.86 10*3/MM3 (ref 0.1–0.9)
MONOCYTES NFR BLD AUTO: 12.6 % (ref 5–12)
NEUTROPHILS NFR BLD AUTO: 4.03 10*3/MM3 (ref 1.7–7)
NEUTROPHILS NFR BLD AUTO: 59.4 % (ref 42.7–76)
NRBC BLD AUTO-RTO: 0 /100 WBC (ref 0–0.2)
PHOSPHATE SERPL-MCNC: 2.8 MG/DL (ref 2.5–4.5)
PLATELET # BLD AUTO: 156 10*3/MM3 (ref 140–450)
PMV BLD AUTO: 9.7 FL (ref 6–12)
POTASSIUM SERPL-SCNC: 4.3 MMOL/L (ref 3.5–5.2)
RBC # BLD AUTO: 4.02 10*6/MM3 (ref 4.14–5.8)
SODIUM SERPL-SCNC: 141 MMOL/L (ref 136–145)
WBC # BLD AUTO: 6.8 10*3/MM3 (ref 3.4–10.8)

## 2021-11-17 PROCEDURE — 80069 RENAL FUNCTION PANEL: CPT | Performed by: INTERNAL MEDICINE

## 2021-11-17 PROCEDURE — 85025 COMPLETE CBC W/AUTO DIFF WBC: CPT | Performed by: HOSPITALIST

## 2021-11-17 RX ORDER — ERGOCALCIFEROL 1.25 MG/1
50000 CAPSULE ORAL
Qty: 5 CAPSULE | Refills: 0 | Status: SHIPPED | OUTPATIENT
Start: 2021-11-18 | End: 2021-12-18

## 2021-11-17 RX ORDER — TAMSULOSIN HYDROCHLORIDE 0.4 MG/1
0.4 CAPSULE ORAL 2 TIMES DAILY
Qty: 30 CAPSULE | Refills: 0 | Status: SHIPPED | OUTPATIENT
Start: 2021-11-17 | End: 2021-12-17

## 2021-11-17 RX ORDER — FAMOTIDINE 20 MG/1
20 TABLET, FILM COATED ORAL
Qty: 60 TABLET | Refills: 0 | Status: SHIPPED | OUTPATIENT
Start: 2021-11-17 | End: 2021-12-17

## 2021-11-17 RX ORDER — ASPIRIN 81 MG/1
81 TABLET, CHEWABLE ORAL DAILY
Qty: 30 TABLET | Refills: 0 | Status: SHIPPED | OUTPATIENT
Start: 2021-11-17 | End: 2021-12-17

## 2021-11-17 RX ORDER — MIRTAZAPINE 15 MG/1
15 TABLET, FILM COATED ORAL NIGHTLY
Qty: 30 TABLET | Refills: 0 | Status: SHIPPED | OUTPATIENT
Start: 2021-11-17 | End: 2021-12-17

## 2021-11-17 RX ADMIN — METOPROLOL TARTRATE 12.5 MG: 25 TABLET, FILM COATED ORAL at 08:22

## 2021-11-17 RX ADMIN — FAMOTIDINE 20 MG: 20 TABLET, FILM COATED ORAL at 08:22

## 2021-11-17 RX ADMIN — TAMSULOSIN HYDROCHLORIDE 0.4 MG: 0.4 CAPSULE ORAL at 08:22

## 2021-11-17 NOTE — CASE MANAGEMENT/SOCIAL WORK
Case Management Discharge Note      Final Note: Pt discharged to [facility] for skilled care.   YOSSI Bauer RN         Selected Continued Care - Admitted Since 11/8/2021     Destination Coordination complete.    Service Provider Selected Services Address Phone Fax Patient Preferred    [facility]  Skilled [address] [address] [phone] --          Durable Medical Equipment    No services have been selected for the patient.              Dialysis/Infusion    No services have been selected for the patient.              Home Medical Care    No services have been selected for the patient.              Therapy    No services have been selected for the patient.              Community Resources    No services have been selected for the patient.              Community & DME    No services have been selected for the patient.                  Transportation Services  Private: Car    Final Discharge Disposition Code: 03 - skilled nursing facility (SNF)

## 2021-11-17 NOTE — CASE MANAGEMENT/SOCIAL WORK
Continued Stay Note  [facility]     Patient Name: Pedro Peck  MRN: [id number]  Today's Date: 11/17/2021    Admit Date: 11/8/2021     Discharge Plan     Row Name 11/17/21 1402       Plan    Plan Plan [facility] for skilled care.   YOSSI Bauer RN    Plan Comments Spoke to pt at bedside.  Per Francie  ( [phone]) [facility] has a bed and can accept pt today.  Discharge Summary to be faxed per Francie.  Discharge Summary in packet.  Packet to RN.   Pt's sister ( Malena Lopez [phone]) will transport pt to [facility].  Plan [facility] for skilled care.   YOSSI Bauer RN               Discharge Codes    No documentation.               Expected Discharge Date and Time     Expected Discharge Date Expected Discharge Time    Nov 17, 2021             Janice Bauer, RN

## 2021-11-17 NOTE — PLAN OF CARE
"Goal Outcome Evaluation:      Patient rested well during the night , with VSS but is still having issue of urine retention. 950cc of dark straw colored urine obtained last night after bladder scan shown 963.. The patient tolerated the procedure without difficulty but states \" I hate keep having to go through all this\". He is drinking offered PO fluids readily, and rarely c/o abdominal discomfort.            "

## 2021-11-17 NOTE — DISCHARGE SUMMARY
Discharge summary    Date of admission 11/8/2021  Date of discharge 11/17/2021    Final diagnosis  Acute kidney injury resolved  Acute UTI with sepsis resolved  Severe dehydration  Urinary retention  Bilateral hydronephrosis  Hypernatremia resolved  Hyperkalemia corrected  Elevated troponin   Failure to thrive  Gastroesophageal reflux disease    Discharge medications    Current Facility-Administered Medications:   •  acetaminophen (TYLENOL) tablet 650 mg, 650 mg, Oral, Q4H PRN, Fernando Ga MD  •  aspirin chewable tablet 81 mg, 81 mg, Oral, Daily, Fernando Ga MD, 81 mg at 11/16/21 1807  •  famotidine (PEPCID) tablet 20 mg, 20 mg, Oral, BID AC, Fernando Ga MD, 20 mg at 11/17/21 0822  •  metoprolol tartrate (LOPRESSOR) tablet 12.5 mg, 12.5 mg, Oral, Q12H, Fernando Ga MD, 12.5 mg at 11/17/21 0822  •  mirtazapine (REMERON) tablet 15 mg, 15 mg, Oral, Nightly, Fernando Ga MD, 15 mg at 11/16/21 2124  •  tamsulosin (FLOMAX) 24 hr capsule 0.4 mg, 0.4 mg, Oral, BID, Rambo Nicholas MD, 0.4 mg at 11/17/21 0822  •  vitamin D (ERGOCALCIFEROL) capsule 50,000 Units, 50,000 Units, Oral, Q7 Days, Brian Farrar MD, 50,000 Units at 11/11/21 1744     Consults obtained  Nephrology  Urology  Nutrition  Spiritual care    Procedures  None    Hospital course  71-year-old white male with no medical problems and no home medication brought to the emergency room with generalized weakness for 1 month.  According to family patient has not been eating drinking for several days to weeks.  Patient work-up in ER revealed severe dehydration with hypernatremia and acute kidney injury.  Patient also found to have UTI with sepsis.  Patient admitted treated with aggressive hydration and empiric antibiotics after obtaining the cultures.  Patient cultures remains negative and his kidney function returned to normal and his work-up with CT revealed bilateral hydronephrosis which is further evaluated by urology.  Patient also have  urinary retention started on Flomax and getting in and out cath every 6 hours.  Patient is eating better but still weak and will be discharged to subacute rehab for PT OT nursing care and continue in and out cath for urinary retention.  Patient will be followed by urology.    Discharge diet regular    Activity per PT OT    Medication as above    Further care per accepting physician at subacute rehab and follow-up with nephrology and urology per the instruction and take medication as directed    STEVEN HERMAN MD

## 2021-11-17 NOTE — PROGRESS NOTES
"Daily progress note    Chief complaint  Doing same  No new complaints  Denies any chest pain shortness of breath palpitation    History of present illness  71-year-old white male with no medical problems and no home medication brought to the emergency room by the family with generalized weakness for 1 month failure to thrive not eating drinking for several days to week.  Patient has no fever cough chest pain shortness of breath abdominal pain nausea vomiting diarrhea.  Patient looks cachectic and work-up in ER revealed acute kidney injury with severe dehydration and hypernatremia also found to have UTI admit for management.  Patient remained fully awake alert and oriented x3.     REVIEW OF SYSTEMS  Unremarkable except generalized weakness     PHYSICAL EXAM   Blood pressure 147/94, pulse 105, temperature 98.8 °F (37.1 °C), temperature source Oral, resp. rate 16, height 177.8 cm (70\"), weight 65.5 kg (144 lb 6.4 oz), SpO2 96 %.    GENERAL: Awake and alert, acutely ill-appearing cachectic, not distressed  HEENT:  Unremarkable  CV: regular rhythm, tachycardic rate, no murmur  RESPIRATORY: normal effort, CTA  ABDOMEN: soft, nontender bowel sounds positive  MUSCULOSKELETAL: no deformity, FROM, no calf swelling or tenderness  NEURO: alert, slurred speech, moves all extremities, follows commands  SKIN: warm, dry     LAB RESULTS  Lab Results (last 24 hours)     Procedure Component Value Units Date/Time    Renal Function Panel [[id number]]  (Abnormal) Collected: 11/17/21 0712    Specimen: Blood Updated: 11/17/21 0817     Glucose 89 mg/dL      BUN 16 mg/dL      Creatinine 1.13 mg/dL      Sodium 141 mmol/L      Potassium 4.3 mmol/L      Chloride 112 mmol/L      CO2 21.3 mmol/L      Calcium 8.5 mg/dL      Albumin 2.90 g/dL      Phosphorus 2.8 mg/dL      Anion Gap 7.7 mmol/L      BUN/Creatinine Ratio 14.2     eGFR Non African Amer 64 mL/min/1.73     Narrative:      GFR Normal >60  Chronic Kidney Disease <60  Kidney Failure <15   "    CBC & Differential [[id number]]  (Abnormal) Collected: 11/17/21 0712    Specimen: Blood Updated: 11/17/21 0729    Narrative:      The following orders were created for panel order CBC & Differential.  Procedure                               Abnormality         Status                     ---------                               -----------         ------                     CBC Auto Differential[[id number]]        Abnormal            Final result                 Please view results for these tests on the individual orders.    CBC Auto Differential [[id number]]  (Abnormal) Collected: 11/17/21 0712    Specimen: Blood Updated: 11/17/21 0729     WBC 6.80 10*3/mm3      RBC 4.02 10*6/mm3      Hemoglobin 13.2 g/dL      Hematocrit 36.7 %      MCV 91.3 fL      MCH 32.8 pg      MCHC 36.0 g/dL      RDW 14.5 %      RDW-SD 48.1 fl      MPV 9.7 fL      Platelets 156 10*3/mm3      Neutrophil % 59.4 %      Lymphocyte % 23.8 %      Monocyte % 12.6 %      Eosinophil % 2.9 %      Basophil % 0.4 %      Immature Grans % 0.9 %      Neutrophils, Absolute 4.03 10*3/mm3      Lymphocytes, Absolute 1.62 10*3/mm3      Monocytes, Absolute 0.86 10*3/mm3      Eosinophils, Absolute 0.20 10*3/mm3      Basophils, Absolute 0.03 10*3/mm3      Immature Grans, Absolute 0.06 10*3/mm3      nRBC 0.0 /100 WBC         Imaging Results (Last 24 Hours)     ** No results found for the last 24 hours. **                 ECG 12 Lead  Component   Ref Range & Units 11/8/21 1546   QT Interval   ms 351              HEART RATE= 107  bpm  RR Interval= 560  ms  NV Interval= 129  ms  P Horizontal Axis= -10  deg  P Front Axis= 78  deg  QRSD Interval= 71  ms  QT Interval= 351  ms  QRS Axis= -60  deg  T Wave Axis= 57  deg  - ABNORMAL ECG -  Sinus tachycardia  Biatrial enlargement  LAD, consider left anterior fascicular block  ST elevation, consider inferior injury  NO PRIOR TRACING AVAILABLE FOR COMPARISON             Current Facility-Administered Medications:   •   acetaminophen (TYLENOL) tablet 650 mg, 650 mg, Oral, Q4H PRN, Steven Ga MD  •  aspirin chewable tablet 81 mg, 81 mg, Oral, Daily, Steven Ga MD, 81 mg at 11/16/21 1807  •  famotidine (PEPCID) tablet 20 mg, 20 mg, Oral, BID AC, Steven Ga MD, 20 mg at 11/17/21 0822  •  metoprolol tartrate (LOPRESSOR) tablet 12.5 mg, 12.5 mg, Oral, Q12H, Steven Ga MD, 12.5 mg at 11/17/21 0822  •  mirtazapine (REMERON) tablet 15 mg, 15 mg, Oral, Nightly, Steven Ga MD, 15 mg at 11/16/21 2124  •  tamsulosin (FLOMAX) 24 hr capsule 0.4 mg, 0.4 mg, Oral, BID, Rambo Nicholas MD, 0.4 mg at 11/17/21 0822  •  vitamin D (ERGOCALCIFEROL) capsule 50,000 Units, 50,000 Units, Oral, Q7 Days, Brian Farrar MD, 50,000 Units at 11/11/21 1744     ASSESSMENT  Acute kidney injury resolved  Acute UTI with sepsis  Severe dehydration  Urinary retention  Bilateral hydronephrosis  Hypernatremia resolved  Hyperkalemia corrected  Elevated troponin   Failure to thrive  Gastroesophageal reflux disease    PLAN  Discharge to subacute rehab  Discharge summary dictated    STEVEN GA MD

## 2021-11-17 NOTE — PROGRESS NOTES
LOS: 9 days     Chief Complaint/ Reason for encounter: Acute kidney injury, severe hypernatremia  Chief Complaint   Patient presents with   • Weakness - Generalized         Subjective   Feels better today, eating and drinking little better  Denies any shortness of breath or chest pain  No fevers or chills  Improved appetite with no nausea or vomiting  No edema, improved urine output. Urine very dark, cola colored per RN    11/11 continues to improve.  Much more awake and alert.  Denies any fevers chills nausea vomiting shortness of breath chest pain or edema  White placed yesterday with over 2 L urine output, urology following and added Flomax    11/12 doing little better today.  More awake and alert, appetite slowly improved  White in place with plans to perform a voiding trial today    11/13 no new complaints, in good spirits.  Eating and drinking well, still not getting out of bed or moving around much.  Denies shortness of breath chest pain fevers chills nausea or vomiting    11/14 not much new today feeling better, slow improvement    11/15 no new complaints or events today. He states he is eating and drinking well    11/16 looks and feels well, appetite improved.  Labs improved.  Still requiring in and out catheterizations as needed    [place]/17 resting, no events or complaints observed.  Eating and drinking well, awaiting disposition to nursing home/rehab bed    Medical history reviewed:  Weakness - Generalized        Subjective    History taken from: Patient and chart    Vital Signs  Temp:  [97.8 °F (36.6 °C)-99.5 °F (37.5 °C)] 99.3 °F (37.4 °C)  Heart Rate:  [] 79  Resp:  [16-18] 16  BP: ()/(62-94) 110/62       Wt Readings from Last 1 Encounters:   11/12/21 0535 65.5 kg (144 lb 6.4 oz)   11/11/21 0500 70.5 kg (155 lb 6.8 oz)   11/10/21 0307 65.1 kg (143 lb 8.3 oz)   11/08/21 2115 60.8 kg (134 lb 0.6 oz)   11/08/21 1504 60.3 kg (133 lb)       Objective:  Vital signs: (most recent): Blood  "pressure 110/62, pulse 79, temperature 99.3 °F (37.4 °C), temperature source Oral, resp. rate 16, height 177.8 cm (70\"), weight 65.5 kg (144 lb 6.4 oz), SpO2 96 %.              Objective:  General Appearance: Somewhat malnourished, comfortable, chronically ill-appearing, in no acute distress and not in pain.  Awake, alert, oriented  HEENT: Mucous membranes moist, no injury, oropharynx clear  Lungs:  Normal effort and normal respiratory rate.  Breath sounds clear to auscultation.  No  respiratory distress.  No rales, decreased breath sounds or rhonchi.    Heart: Normal rate.  Regular rhythm.  S1 normal.  No murmur.   Abdomen: Abdomen is soft.  Bowel sounds are normal, no abdominal tenderness.  There is no rebound or guarding  Extremities: Normal range of motion. No significant edema of bilateral lower extremities, distal pulses intact  Neurological: No focal motor or sensory deficits, pupils reactive  Skin:  Warm and dry.  No rash or cyanosis.       Results Review:    Intake/Output:     Intake/Output Summary (Last 24 hours) at 11/17/2021 1548  Last data filed at 11/17/2021 1309  Gross per 24 hour   Intake --   Output 1650 ml   Net -1650 ml         DATA:  Radiology and Labs:  The following labs independently reviewed by me. Additional labs ordered for tomorrow a.m.  Interval notes, chart personally reviewed by me.   Old records independently reviewed showing no prior known history of chronic kidney disease  The following radiologic studies independently viewed by me, findings renal ultrasound shows marked right hydronephrosis, moderate left hydronephrosis and distended urinary bladder, moderately enlarged prostate  New problems include hydronephrosis  Discussed with patient and his nurse at bedside    Risk/ complexity of medical care/ medical decision making moderate risk    Labs:   Recent Results (from the past 24 hour(s))   Renal Function Panel    Collection Time: 11/17/21  7:12 AM    Specimen: Blood   Result Value " Ref Range    Glucose 89 65 - 99 mg/dL    BUN 16 8 - 23 mg/dL    Creatinine 1.13 0.76 - 1.27 mg/dL    Sodium 141 136 - 145 mmol/L    Potassium 4.3 3.5 - 5.2 mmol/L    Chloride 112 (H) 98 - 107 mmol/L    CO2 21.3 (L) 22.0 - 29.0 mmol/L    Calcium 8.5 (L) 8.6 - 10.5 mg/dL    Albumin 2.90 (L) 3.50 - 5.20 g/dL    Phosphorus 2.8 2.5 - 4.5 mg/dL    Anion Gap 7.7 5.0 - 15.0 mmol/L    BUN/Creatinine Ratio 14.2 7.0 - 25.0    eGFR Non African Amer 64 >60 mL/min/1.73   CBC Auto Differential    Collection Time: 11/17/21  7:12 AM    Specimen: Blood   Result Value Ref Range    WBC 6.80 3.40 - 10.80 10*3/mm3    RBC 4.02 (L) 4.14 - 5.80 10*6/mm3    Hemoglobin 13.2 13.0 - 17.7 g/dL    Hematocrit 36.7 (L) 37.5 - 51.0 %    MCV 91.3 79.0 - 97.0 fL    MCH 32.8 26.6 - 33.0 pg    MCHC 36.0 (H) 31.5 - 35.7 g/dL    RDW 14.5 12.3 - 15.4 %    RDW-SD 48.1 37.0 - 54.0 fl    MPV 9.7 6.0 - 12.0 fL    Platelets 156 140 - 450 10*3/mm3    Neutrophil % 59.4 42.7 - 76.0 %    Lymphocyte % 23.8 19.6 - 45.3 %    Monocyte % 12.6 (H) 5.0 - 12.0 %    Eosinophil % 2.9 0.3 - 6.2 %    Basophil % 0.4 0.0 - 1.5 %    Immature Grans % 0.9 (H) 0.0 - 0.5 %    Neutrophils, Absolute 4.03 1.70 - 7.00 10*3/mm3    Lymphocytes, Absolute 1.62 0.70 - 3.10 10*3/mm3    Monocytes, Absolute 0.86 0.10 - 0.90 10*3/mm3    Eosinophils, Absolute 0.20 0.00 - 0.40 10*3/mm3    Basophils, Absolute 0.03 0.00 - 0.20 10*3/mm3    Immature Grans, Absolute 0.06 (H) 0.00 - 0.05 10*3/mm3    nRBC 0.0 0.0 - 0.2 /100 WBC       Radiology:  Imaging Results (Last 24 Hours)     ** No results found for the last 24 hours. **             Medications have been reviewed:  Current Facility-Administered Medications   Medication Dose Route Frequency Provider Last Rate Last Admin   • acetaminophen (TYLENOL) tablet 650 mg  650 mg Oral Q4H PRN Fernando Ga MD       • aspirin chewable tablet 81 mg  81 mg Oral Daily Fernando Ga MD   81 mg at 11/16/21 2246   • famotidine (PEPCID) tablet 20 mg  20 mg Oral BID AC  Fernando Ga MD   20 mg at 11/17/21 0822   • metoprolol tartrate (LOPRESSOR) tablet 12.5 mg  12.5 mg Oral Q12H Fernando Ga MD   12.5 mg at 11/17/21 0822   • mirtazapine (REMERON) tablet 15 mg  15 mg Oral Nightly Fernando Ga MD   15 mg at 11/16/21 2124   • tamsulosin (FLOMAX) 24 hr capsule 0.4 mg  0.4 mg Oral BID Rambo Nicholas MD   0.4 mg at 11/17/21 0822   • vitamin D (ERGOCALCIFEROL) capsule 50,000 Units  50,000 Units Oral Q7 Days Brian Farrar MD   50,000 Units at 11/11/21 1744     Current Outpatient Medications   Medication Sig Dispense Refill   • aspirin 81 MG chewable tablet Chew 1 tablet Daily for 30 days. 30 tablet 0   • famotidine (PEPCID) 20 MG tablet Take 1 tablet by mouth 2 (Two) Times a Day Before Meals for 30 days. 60 tablet 0   • metoprolol tartrate (LOPRESSOR) 25 MG tablet Take 0.5 tablets by mouth Every 12 (Twelve) Hours for 30 days. 30 tablet 0   • mirtazapine (REMERON) 15 MG tablet Take 1 tablet by mouth Every Night for 30 days. 30 tablet 0   • tamsulosin (FLOMAX) 0.4 MG capsule 24 hr capsule Take 1 capsule by mouth 2 (Two) Times a Day for 30 days. 30 capsule 0   • [START ON 11/18/2021] vitamin D (ERGOCALCIFEROL) 1.25 MG (87461 UT) capsule capsule Take 1 capsule by mouth Every 7 (Seven) Days for 30 days. 5 capsule 0       ASSESSMENT:  severe renal failure secondary to profound dehydration, improved with fluids  Profound hypernatremia, still high but much improved  Lactic acidosis from hypotension  Hypotension related to severe dehydration, mildly better  Elevated LFTs  Hypercalcemia secondary to dehydration  Elevated hemoglobin related to hemoconcentration/dehydration  Cachexia and malnutrition  History of autism and cognitive deficits  Hyperphosphatemia related to renal failure  Mild CK elevation, possibly some element of mild rhabdo  New, vitamin D deficiency  hypophosphatemia        PLAN:  Creatinine improved today, 1.1 which is near baseline  Continue to encourage encourage  oral fluid and nutritional intake, on Megace  Intermittent catheterization as needed per urology, to continue at rehab facility.  On Flomax  Maintain on vitamin D   Otherwise stable for discharge anytime from a renal standpoint, await rehab bed        Brian Farrar MD   [facility]   Office phone number: [phone]  Answering service phone number: [phone]    11/17/21  15:48 EST    Dictation performed using Dragon dictation software

## 2023-07-17 ENCOUNTER — HOSPITAL ENCOUNTER (INPATIENT)
Facility: HOSPITAL | Age: 73
LOS: 5 days | Discharge: NURSING, REHAB OR LONG-TERM CARE | DRG: 872 | End: 2023-07-22
Attending: EMERGENCY MEDICINE | Admitting: INTERNAL MEDICINE
Payer: MEDICARE

## 2023-07-17 ENCOUNTER — APPOINTMENT (OUTPATIENT)
Dept: CT IMAGING | Facility: HOSPITAL | Age: 73
DRG: 872 | End: 2023-07-17
Payer: MEDICARE

## 2023-07-17 DIAGNOSIS — N17.9 AKI (ACUTE KIDNEY INJURY): ICD-10-CM

## 2023-07-17 DIAGNOSIS — T79.6XXA TRAUMATIC RHABDOMYOLYSIS, INITIAL ENCOUNTER: Primary | ICD-10-CM

## 2023-07-17 PROBLEM — M62.82 RHABDOMYOLYSIS: Status: ACTIVE | Noted: 2023-07-17

## 2023-07-17 PROBLEM — R19.7 DIARRHEA: Status: ACTIVE | Noted: 2023-07-17

## 2023-07-17 PROBLEM — N39.0 SEPSIS SECONDARY TO UTI: Status: ACTIVE | Noted: 2023-07-17

## 2023-07-17 PROBLEM — H25.13 AGE-RELATED NUCLEAR CATARACT OF BOTH EYES: Status: ACTIVE | Noted: 2021-12-09

## 2023-07-17 PROBLEM — H43.819 POSTERIOR VITREOUS DETACHMENT: Status: ACTIVE | Noted: 2021-12-09

## 2023-07-17 PROBLEM — N30.00 ACUTE CYSTITIS WITHOUT HEMATURIA: Status: ACTIVE | Noted: 2023-07-17

## 2023-07-17 PROBLEM — A41.9 SEPSIS SECONDARY TO UTI: Status: ACTIVE | Noted: 2023-07-17

## 2023-07-17 LAB
ALBUMIN SERPL-MCNC: 3.1 G/DL (ref 3.5–5.2)
ALBUMIN/GLOB SERPL: 0.9 G/DL
ALP SERPL-CCNC: 137 U/L (ref 39–117)
ALT SERPL W P-5'-P-CCNC: 154 U/L (ref 1–41)
ANION GAP SERPL CALCULATED.3IONS-SCNC: 18 MMOL/L (ref 5–15)
AST SERPL-CCNC: 430 U/L (ref 1–40)
BACTERIA UR QL AUTO: ABNORMAL /HPF
BILIRUB SERPL-MCNC: 1.6 MG/DL (ref 0–1.2)
BILIRUB UR QL STRIP: ABNORMAL
BUN SERPL-MCNC: 89 MG/DL (ref 8–23)
BUN/CREAT SERPL: 13.5 (ref 7–25)
CALCIUM SPEC-SCNC: 8.9 MG/DL (ref 8.6–10.5)
CHLORIDE SERPL-SCNC: 105 MMOL/L (ref 98–107)
CK SERPL-CCNC: ABNORMAL U/L (ref 20–200)
CLARITY UR: ABNORMAL
CO2 SERPL-SCNC: 18 MMOL/L (ref 22–29)
COLOR UR: ABNORMAL
CREAT SERPL-MCNC: 6.58 MG/DL (ref 0.76–1.27)
D-LACTATE SERPL-SCNC: 2.4 MMOL/L (ref 0.5–2)
D-LACTATE SERPL-SCNC: 2.5 MMOL/L (ref 0.5–2)
DEPRECATED RDW RBC AUTO: 44.6 FL (ref 37–54)
EGFRCR SERPLBLD CKD-EPI 2021: 8.3 ML/MIN/1.73
ERYTHROCYTE [DISTWIDTH] IN BLOOD BY AUTOMATED COUNT: 13.9 % (ref 12.3–15.4)
GLOBULIN UR ELPH-MCNC: 3.3 GM/DL
GLUCOSE SERPL-MCNC: 69 MG/DL (ref 65–99)
GLUCOSE UR STRIP-MCNC: NEGATIVE MG/DL
HCT VFR BLD AUTO: 42.2 % (ref 37.5–51)
HGB BLD-MCNC: 14.2 G/DL (ref 13–17.7)
HGB UR QL STRIP.AUTO: ABNORMAL
HOLD SPECIMEN: NORMAL
HOLD SPECIMEN: NORMAL
HYALINE CASTS UR QL AUTO: ABNORMAL /LPF
KETONES UR QL STRIP: ABNORMAL
LEUKOCYTE ESTERASE UR QL STRIP.AUTO: ABNORMAL
LYMPHOCYTES # BLD MANUAL: 0.58 10*3/MM3 (ref 0.7–3.1)
LYMPHOCYTES NFR BLD MANUAL: 4 % (ref 5–12)
MCH RBC QN AUTO: 29.9 PG (ref 26.6–33)
MCHC RBC AUTO-ENTMCNC: 33.6 G/DL (ref 31.5–35.7)
MCV RBC AUTO: 88.8 FL (ref 79–97)
MONOCYTES # BLD: 1.17 10*3/MM3 (ref 0.1–0.9)
NEUTROPHILS # BLD AUTO: 27.46 10*3/MM3 (ref 1.7–7)
NEUTROPHILS NFR BLD MANUAL: 93.9 % (ref 42.7–76)
NITRITE UR QL STRIP: POSITIVE
PH UR STRIP.AUTO: 7.5 [PH] (ref 5–8)
PLAT MORPH BLD: NORMAL
PLATELET # BLD AUTO: 53 10*3/MM3 (ref 140–450)
PMV BLD AUTO: 12.1 FL (ref 6–12)
POIKILOCYTOSIS BLD QL SMEAR: ABNORMAL
POTASSIUM SERPL-SCNC: 5.5 MMOL/L (ref 3.5–5.2)
PROT SERPL-MCNC: 6.4 G/DL (ref 6–8.5)
PROT UR QL STRIP: ABNORMAL
QT INTERVAL: 339 MS
RBC # BLD AUTO: 4.75 10*6/MM3 (ref 4.14–5.8)
RBC # UR STRIP: ABNORMAL /HPF
REF LAB TEST METHOD: ABNORMAL
SODIUM SERPL-SCNC: 141 MMOL/L (ref 136–145)
SP GR UR STRIP: 1.02 (ref 1–1.03)
SQUAMOUS #/AREA URNS HPF: ABNORMAL /HPF
UROBILINOGEN UR QL STRIP: ABNORMAL
VARIANT LYMPHS NFR BLD MANUAL: 2 % (ref 19.6–45.3)
WBC # UR STRIP: ABNORMAL /HPF
WBC MORPH BLD: NORMAL
WBC NRBC COR # BLD: 29.24 10*3/MM3 (ref 3.4–10.8)
WHOLE BLOOD HOLD COAG: NORMAL
WHOLE BLOOD HOLD SPECIMEN: NORMAL

## 2023-07-17 PROCEDURE — 81001 URINALYSIS AUTO W/SCOPE: CPT | Performed by: EMERGENCY MEDICINE

## 2023-07-17 PROCEDURE — 87086 URINE CULTURE/COLONY COUNT: CPT | Performed by: EMERGENCY MEDICINE

## 2023-07-17 PROCEDURE — 80053 COMPREHEN METABOLIC PANEL: CPT | Performed by: EMERGENCY MEDICINE

## 2023-07-17 PROCEDURE — 99285 EMERGENCY DEPT VISIT HI MDM: CPT

## 2023-07-17 PROCEDURE — 93005 ELECTROCARDIOGRAM TRACING: CPT | Performed by: EMERGENCY MEDICINE

## 2023-07-17 PROCEDURE — 93010 ELECTROCARDIOGRAM REPORT: CPT | Performed by: INTERNAL MEDICINE

## 2023-07-17 PROCEDURE — 82550 ASSAY OF CK (CPK): CPT | Performed by: EMERGENCY MEDICINE

## 2023-07-17 PROCEDURE — 87186 SC STD MICRODIL/AGAR DIL: CPT | Performed by: EMERGENCY MEDICINE

## 2023-07-17 PROCEDURE — 74176 CT ABD & PELVIS W/O CONTRAST: CPT

## 2023-07-17 PROCEDURE — 25010000002 CEFTRIAXONE PER 250 MG: Performed by: EMERGENCY MEDICINE

## 2023-07-17 PROCEDURE — 83605 ASSAY OF LACTIC ACID: CPT | Performed by: EMERGENCY MEDICINE

## 2023-07-17 PROCEDURE — 51702 INSERT TEMP BLADDER CATH: CPT

## 2023-07-17 PROCEDURE — 70450 CT HEAD/BRAIN W/O DYE: CPT

## 2023-07-17 PROCEDURE — 87077 CULTURE AEROBIC IDENTIFY: CPT | Performed by: EMERGENCY MEDICINE

## 2023-07-17 PROCEDURE — 87040 BLOOD CULTURE FOR BACTERIA: CPT | Performed by: EMERGENCY MEDICINE

## 2023-07-17 PROCEDURE — 85007 BL SMEAR W/DIFF WBC COUNT: CPT | Performed by: EMERGENCY MEDICINE

## 2023-07-17 PROCEDURE — 85025 COMPLETE CBC W/AUTO DIFF WBC: CPT | Performed by: EMERGENCY MEDICINE

## 2023-07-17 PROCEDURE — 36415 COLL VENOUS BLD VENIPUNCTURE: CPT | Performed by: EMERGENCY MEDICINE

## 2023-07-17 PROCEDURE — 87150 DNA/RNA AMPLIFIED PROBE: CPT | Performed by: EMERGENCY MEDICINE

## 2023-07-17 RX ORDER — SODIUM CHLORIDE 0.9 % (FLUSH) 0.9 %
10 SYRINGE (ML) INJECTION EVERY 12 HOURS SCHEDULED
Status: DISCONTINUED | OUTPATIENT
Start: 2023-07-17 | End: 2023-07-22 | Stop reason: HOSPADM

## 2023-07-17 RX ORDER — SODIUM CHLORIDE 9 MG/ML
40 INJECTION, SOLUTION INTRAVENOUS AS NEEDED
Status: DISCONTINUED | OUTPATIENT
Start: 2023-07-17 | End: 2023-07-22 | Stop reason: HOSPADM

## 2023-07-17 RX ORDER — BISACODYL 10 MG
10 SUPPOSITORY, RECTAL RECTAL DAILY PRN
Status: DISCONTINUED | OUTPATIENT
Start: 2023-07-17 | End: 2023-07-22 | Stop reason: HOSPADM

## 2023-07-17 RX ORDER — SODIUM CHLORIDE 0.9 % (FLUSH) 0.9 %
10 SYRINGE (ML) INJECTION AS NEEDED
Status: DISCONTINUED | OUTPATIENT
Start: 2023-07-17 | End: 2023-07-22 | Stop reason: HOSPADM

## 2023-07-17 RX ORDER — ONDANSETRON 2 MG/ML
4 INJECTION INTRAMUSCULAR; INTRAVENOUS EVERY 6 HOURS PRN
Status: DISCONTINUED | OUTPATIENT
Start: 2023-07-17 | End: 2023-07-22 | Stop reason: HOSPADM

## 2023-07-17 RX ORDER — ACETAMINOPHEN 650 MG/1
650 SUPPOSITORY RECTAL EVERY 4 HOURS PRN
Status: DISCONTINUED | OUTPATIENT
Start: 2023-07-17 | End: 2023-07-22 | Stop reason: HOSPADM

## 2023-07-17 RX ORDER — POLYETHYLENE GLYCOL 3350 17 G/17G
17 POWDER, FOR SOLUTION ORAL DAILY PRN
Status: DISCONTINUED | OUTPATIENT
Start: 2023-07-17 | End: 2023-07-22 | Stop reason: HOSPADM

## 2023-07-17 RX ORDER — SODIUM CHLORIDE, SODIUM LACTATE, POTASSIUM CHLORIDE, CALCIUM CHLORIDE 600; 310; 30; 20 MG/100ML; MG/100ML; MG/100ML; MG/100ML
125 INJECTION, SOLUTION INTRAVENOUS CONTINUOUS
Status: DISCONTINUED | OUTPATIENT
Start: 2023-07-17 | End: 2023-07-17

## 2023-07-17 RX ORDER — ACETAMINOPHEN 160 MG/5ML
650 SOLUTION ORAL EVERY 4 HOURS PRN
Status: DISCONTINUED | OUTPATIENT
Start: 2023-07-17 | End: 2023-07-22 | Stop reason: HOSPADM

## 2023-07-17 RX ORDER — SODIUM CHLORIDE, SODIUM LACTATE, POTASSIUM CHLORIDE, CALCIUM CHLORIDE 600; 310; 30; 20 MG/100ML; MG/100ML; MG/100ML; MG/100ML
150 INJECTION, SOLUTION INTRAVENOUS CONTINUOUS
Status: DISCONTINUED | OUTPATIENT
Start: 2023-07-17 | End: 2023-07-17

## 2023-07-17 RX ORDER — BISACODYL 5 MG/1
5 TABLET, DELAYED RELEASE ORAL DAILY PRN
Status: DISCONTINUED | OUTPATIENT
Start: 2023-07-17 | End: 2023-07-22 | Stop reason: HOSPADM

## 2023-07-17 RX ORDER — AMOXICILLIN 250 MG
2 CAPSULE ORAL 2 TIMES DAILY
Status: DISCONTINUED | OUTPATIENT
Start: 2023-07-17 | End: 2023-07-22 | Stop reason: HOSPADM

## 2023-07-17 RX ORDER — FAMOTIDINE 20 MG/1
20 TABLET, FILM COATED ORAL 2 TIMES DAILY
COMMUNITY

## 2023-07-17 RX ORDER — ONDANSETRON 4 MG/1
4 TABLET, FILM COATED ORAL EVERY 6 HOURS PRN
Status: DISCONTINUED | OUTPATIENT
Start: 2023-07-17 | End: 2023-07-22 | Stop reason: HOSPADM

## 2023-07-17 RX ORDER — NITROGLYCERIN 0.4 MG/1
0.4 TABLET SUBLINGUAL
Status: DISCONTINUED | OUTPATIENT
Start: 2023-07-17 | End: 2023-07-22 | Stop reason: HOSPADM

## 2023-07-17 RX ORDER — ACETAMINOPHEN 325 MG/1
650 TABLET ORAL EVERY 4 HOURS PRN
Status: DISCONTINUED | OUTPATIENT
Start: 2023-07-17 | End: 2023-07-22 | Stop reason: HOSPADM

## 2023-07-17 RX ADMIN — SODIUM CHLORIDE, POTASSIUM CHLORIDE, SODIUM LACTATE AND CALCIUM CHLORIDE 150 ML/HR: 600; 310; 30; 20 INJECTION, SOLUTION INTRAVENOUS at 16:41

## 2023-07-17 RX ADMIN — SODIUM CHLORIDE 1000 ML: 9 INJECTION, SOLUTION INTRAVENOUS at 10:38

## 2023-07-17 RX ADMIN — SODIUM CHLORIDE, POTASSIUM CHLORIDE, SODIUM LACTATE AND CALCIUM CHLORIDE 1000 ML: 600; 310; 30; 20 INJECTION, SOLUTION INTRAVENOUS at 11:50

## 2023-07-17 RX ADMIN — CEFTRIAXONE SODIUM 1000 MG: 1 INJECTION, POWDER, FOR SOLUTION INTRAMUSCULAR; INTRAVENOUS at 12:45

## 2023-07-17 RX ADMIN — Medication 10 ML: at 16:14

## 2023-07-17 NOTE — ED NOTES
Pt to ED from home, found on floor this am by sister lying by the bathroom. Pt has an indwelling FC d/t prostatectomy. Family states last spoke to pt last week. Tried to call him Friday but was unable to reach. Pt is confused, a/o x 2. FC has dark keith blood noted. Pt denies pain. Unsure of how fall happened. Cannot give details from last few days, could not states when he last showered, unclear on when he last ate.

## 2023-07-17 NOTE — H&P
Patient Name:  Pedro Peck  YOB: 1950  MRN:  [id number]  Admit Date:  7/17/2023  Patient Care Team:  Brandon Rosen APRN as PCP - General (Nurse Practitioner)      Subjective   History Present Illness     Chief Complaint   Patient presents with    Fall       Mr. Peck is a 73 y.o. non-smoker with a history of autism and history of acute kidney injury that presents to [facility] complaining of fall.    Patient lives alone with sister and niece at bedside providing history of present illness.  Reportedly not answering her phone for 2 days; therefore, went to patient's residence and found him confused and lying on the floor in his feces.     Recent bedside states she manages patient's medications and denies recent use of antibiotic or history of C. difficile colitis.    Chronic White with brown urine.     Serum creatinine 6.5 increased from 1.1 (11/2021), creatine kinase >11,000.urinalysis concerning for infection.  Patient received empiric IV ceftriaxone in the ER and IV fluids as well for sepsis.    Recommendation pending hospital course.  Details below.    History of Present Illness  Review of Systems   Constitutional:  Negative for chills and fever.   HENT:  Negative for congestion and rhinorrhea.    Respiratory:  Negative for cough and shortness of breath.    Cardiovascular:  Negative for chest pain.   Gastrointestinal:  Negative for abdominal pain, constipation, diarrhea, nausea and vomiting.   Endocrine: Negative for polydipsia, polyphagia and polyuria.   Genitourinary:  Negative for difficulty urinating and dysuria.   Musculoskeletal:  Positive for arthralgias (left shoulder pain) and gait problem (due to generalized weakness).   Skin:  Negative for rash and wound.   Neurological:  Positive for syncope (Limited ROS regarding HPI). Negative for seizures and light-headedness.   Psychiatric/Behavioral:  Positive for confusion. Negative for hallucinations.       Personal  History     Past Medical History:   Diagnosis Date    Autism     Falls      Past Surgical History:   Procedure Laterality Date    PROSTATECTOMY       History reviewed. No pertinent family history.  Social History     Tobacco Use    Smoking status: Never    Smokeless tobacco: Never   Substance Use Topics    Alcohol use: Not Currently    Drug use: Never     No current facility-administered medications on file prior to encounter.     Current Outpatient Medications on File Prior to Encounter   Medication Sig Dispense Refill    famotidine (PEPCID) 20 MG tablet Take 1 tablet by mouth 2 (Two) Times a Day.      mirtazapine (REMERON) 15 MG tablet Take 1 tablet by mouth Every Night for 30 days. 30 tablet 0    [DISCONTINUED] metoprolol tartrate (LOPRESSOR) 25 MG tablet Take 0.5 tablets by mouth Every 12 (Twelve) Hours for 30 days. 30 tablet 0     No Known Allergies    Objective    Objective     Vital Signs  Temp:  [97.6 °F (36.4 °C)-97.7 °F (36.5 °C)] 97.7 °F (36.5 °C)  Heart Rate:  [] 103  Resp:  [16-20] 18  BP: ()/(56-83) 111/83  SpO2:  [94 %-96 %] 95 %  on   ;   Device (Oxygen Therapy): room air  Body mass index is 20.81 kg/m².    Physical Exam  Constitutional:       General: He is not in acute distress.     Appearance: He is ill-appearing. He is not toxic-appearing.   HENT:      Head: Normocephalic and atraumatic.   Eyes:      Extraocular Movements: Extraocular movements intact.      Conjunctiva/sclera: Conjunctivae normal.   Cardiovascular:      Rate and Rhythm: Normal rate.   Pulmonary:      Effort: Pulmonary effort is normal.      Breath sounds: Normal breath sounds.   Abdominal:      General: Bowel sounds are normal.      Palpations: Abdomen is soft.   Genitourinary:     Comments: Brown urine color with indwelling White catheter draining to gravity  Musculoskeletal:      Cervical back: Normal range of motion and neck supple.      Right lower leg: No edema.      Left lower leg: No edema.   Skin:     General:  Skin is warm and dry.   Neurological:      Mental Status: He is alert. He is disoriented.      Cranial Nerves: No cranial nerve deficit.   Psychiatric:         Behavior: Behavior normal.         Thought Content: Thought content normal.       Results Review:  I reviewed the patient's new clinical results.  I reviewed the patient's new imaging results and agree with the interpretation.  I reviewed the patient's other test results and agree with the interpretation  I personally viewed and interpreted the patient's EKG/Telemetry data  Discussed with ED provider.    Lab Results (last 24 hours)       Procedure Component Value Units Date/Time    CBC & Differential [[id number]]  (Abnormal) Collected: 07/17/23 1034    Specimen: Blood Updated: 07/17/23 1103    Narrative:      The following orders were created for panel order CBC & Differential.  Procedure                               Abnormality         Status                     ---------                               -----------         ------                     CBC Auto Differential[[id number]]        Abnormal            Final result                 Please view results for these tests on the individual orders.    Comprehensive Metabolic Panel [[id number]]  (Abnormal) Collected: 07/17/23 1034    Specimen: Blood Updated: 07/17/23 1109     Glucose 69 mg/dL      BUN 89 mg/dL      Creatinine 6.58 mg/dL      Sodium 141 mmol/L      Potassium 5.5 mmol/L      Chloride 105 mmol/L      CO2 18.0 mmol/L      Calcium 8.9 mg/dL      Total Protein 6.4 g/dL      Albumin 3.1 g/dL      ALT (SGPT) 154 U/L      AST (SGOT) 430 U/L      Alkaline Phosphatase 137 U/L      Total Bilirubin 1.6 mg/dL      Globulin 3.3 gm/dL      A/G Ratio 0.9 g/dL      BUN/Creatinine Ratio 13.5     Anion Gap 18.0 mmol/L      eGFR 8.3 mL/min/1.73      Comment: <15 Indicative of kidney failure       Narrative:      GFR Normal >60  Chronic Kidney Disease <60  Kidney Failure <15    The GFR formula is only valid for adults  with stable renal function between ages 18 and 70.    CK [[id number]]  (Abnormal) Collected: 07/17/23 1034    Specimen: Blood Updated: 07/17/23 1120     Creatine Kinase 11,393 U/L     CBC Auto Differential [[id number]]  (Abnormal) Collected: 07/17/23 1034    Specimen: Blood Updated: 07/17/23 1103     WBC 29.24 10*3/mm3      RBC 4.75 10*6/mm3      Hemoglobin 14.2 g/dL      Hematocrit 42.2 %      MCV 88.8 fL      MCH 29.9 pg      MCHC 33.6 g/dL      RDW 13.9 %      RDW-SD 44.6 fl      MPV 12.1 fL      Platelets 53 10*3/mm3     Manual Differential [[id number]]  (Abnormal) Collected: 07/17/23 1034    Specimen: Blood Updated: 07/17/23 1111     Neutrophil % 93.9 %      Lymphocyte % 2.0 %      Monocyte % 4.0 %      Neutrophils Absolute 27.46 10*3/mm3      Lymphocytes Absolute 0.58 10*3/mm3      Monocytes Absolute 1.17 10*3/mm3      Poikilocytes Mod/2+     WBC Morphology Normal     Platelet Morphology Normal    Urinalysis With Microscopic If Indicated (No Culture) - Indwelling Urethral Catheter [[id number]]  (Abnormal) Collected: 07/17/23 1038    Specimen: Urine from Indwelling Urethral Catheter Updated: 07/17/23 1059     Color, UA Red     Comment: Any Substance that causes an abnormal urine color can alter the accuracy of the chemical reactions.        Appearance, UA Cloudy     pH, UA 7.5     Specific Gravity, UA 1.020     Glucose, UA Negative     Ketones, UA Trace     Bilirubin, UA Moderate (2+)     Blood, UA Large (3+)     Protein, UA >=300 mg/dL (3+)     Leuk Esterase, UA Large (3+)     Nitrite, UA Positive     Urobilinogen, UA 1.0 E.U./dL    Urinalysis, Microscopic Only - Indwelling Urethral Catheter [[id number]]  (Abnormal) Collected: 07/17/23 1038    Specimen: Urine from Indwelling Urethral Catheter Updated: 07/17/23 1100     RBC, UA Too Numerous to Count /HPF      WBC, UA       Unable to determine due to loaded field     /HPF     Bacteria, UA       Unable to determine due to loaded field     /HPF     Squamous Epithelial  Cells, UA       Unable to determine due to loaded field     /HPF     Hyaline Casts, UA       Unable to determine due to loaded field     /LPF     Methodology Automated Microscopy    Urine Culture - Urine, Indwelling Urethral Catheter [[id number]] Collected: 07/17/23 1038    Specimen: Urine from Indwelling Urethral Catheter Updated: 07/17/23 1313    Blood Culture - Blood, Arm, Left [[id number]] Collected: 07/17/23 1151    Specimen: Blood from Arm, Left Updated: 07/17/23 1158    Lactic Acid, Plasma [[id number]]  (Abnormal) Collected: 07/17/23 1151    Specimen: Blood Updated: 07/17/23 1234     Lactate 2.4 mmol/L     Blood Culture - Blood, Arm, Left [[id number]] Collected: 07/17/23 1243    Specimen: Blood from Arm, Left Updated: 07/17/23 1254            Imaging Results (Last 24 Hours)       Procedure Component Value Units Date/Time    CT Abdomen Pelvis Without Contrast [[id number]] Collected: 07/17/23 1329     Updated: 07/17/23 1336    Narrative:      EXAMINATION: CT OF THE ABDOMEN AND PELVIS WITHOUT CONTRAST     TECHNIQUE: Computed tomography of the abdomen and pelvis without  intravenous contrast per protocol. Radiation dose reduction techniques  were utilized, including automated exposure control and exposure  modulation based on body size.      HISTORY: Hematuria     COMPARISON: None available     FINDINGS: Limited evaluation of the inferior thorax demonstrates  atelectasis, without consolidation, pleural effusion, or pneumothorax.  The heart is normal in size, without pericardial effusion. Enlarged  lymph nodes are seen near the distal esophagus, measuring up to 1.2 cm  in short axis diameter on series 2, image 6.     The examination is limited by patient motion. Likely parapelvic cysts  are seen in the left kidney. The right kidney is atrophic. There is  urinary bladder distention and trabeculation. Mild right-sided  hydronephrosis is seen. No left-sided hydronephrosis is demonstrated.  There is layering material in  the urinary bladder. The urinary bladder  is markedly distended.     The liver, spleen, adrenal glands, pancreas, stomach, small bowel, large  bowel, and abdominal vasculature are normal as evaluated on this  noncontrast examination. There are right greater than left  fat-containing inguinal hernias. No intraperitoneal fluid collection or  free gas are seen. No enlarged lymph nodes are demonstrated in the  abdomen or pelvis.     Bone windows demonstrate degenerative changes, without suspicious  osseous lesion seen.       Impression:      1. Lower thoracic lymphadenopathy. Correlate with history and physical  exam  2. Limited examination of the kidneys demonstrate parapelvic cyst on the  left and renal atrophy with mild hydronephrosis on the right  3. Layering material in the urinary bladder. Correlate with urinalysis  4. Thick-walled, trabeculated urinary bladder, suggestive of outlet  obstruction  5. Urinary bladder distention. Consider catheterization  6. Additional incidental findings as above.     This report was finalized on 7/17/2023 1:33 PM by Dr. Eran Jansen M.D.       CT Head Without Contrast [[id number]] Collected: 07/17/23 1109     Updated: 07/17/23 1109    Narrative:      CT HEAD WITHOUT CONTRAST     HISTORY: Found on floor.     COMPARISON: None.     FINDINGS: Patient's head is canted in the scanner. There is  age-appropriate atrophy. Mild-to-moderate vascular calcifications  involving the carotid siphons are noted. There is no evidence of  hemorrhage, hydrocephalus or of a focal area of decreased attenuation to  suggest acute infarction. Further evaluation could be performed with MRI  examination of brain as indicated.           Radiation dose reduction techniques were utilized, including automated  exposure control and exposure modulation based on body size.                      ECG 12 Lead Altered Mental Status   Final Result   HEART RATE= 106  bpm   RR Interval= 566  ms   MD Interval= 143  ms   P  Horizontal Axis= 12  deg   P Front Axis= 53  deg   QRSD Interval= 100  ms   QT Interval= 339  ms   QRS Axis= -48  deg   T Wave Axis= 28  deg   - ABNORMAL ECG -   Sinus tachycardia   LAD, consider left anterior fascicular block   Low voltage, precordial leads   RSR' in V1 or V2, right VCD or RVH   Consider anterior infarct   No change from previous tracing   Electronically Signed By: Evelyn Nieto ([facility]) 17-Jul-2023 14:03:03   Date and Time of Study: 2023-07-17 10:27:32      SCANNED - TELEMETRY     Final Result      SCANNED - TELEMETRY     Final Result           Assessment/Plan     Active Hospital Problems    Diagnosis  POA    **DERECK (acute kidney injury) [N17.9]  Yes    Rhabdomyolysis [M62.82]  Unknown    Sepsis secondary to UTI [A41.9, N39.0]  Unknown    Acute cystitis without hematuria [N30.00]  Unknown    Diarrhea [R19.7]  Unknown      Resolved Hospital Problems   No resolved problems to display.       Mr. Peck is a 73 y.o. non-smoker with a history of autism and history of acute kidney injury that presents to [facility] complaining of fall.      DERECK (acute kidney injury):  Serum creatinine 6.5 increased from 1.1 (2021).  Avoid nephrotoxins.  See plan below.  Repeat labs in AM.      Rhabdomyolysis:  CK 11,000.  Status post IV fluid bolus.  Continue generous IV fluid hydration.  Repeat CK today and in AM.        Sepsis secondary to UTI: Serum lactate 2.4 with associated leukocytosis--WBC count 29,000.  Continue empiric IV ceftriaxone for now.  Monitor blood cultures x2 and urine culture results.      Acute cystitis without hematuria:  See plan above.      Diarrhea: Ordering GI PCR and C. difficile PCR.    *Guarded prognosis; therefore, plan to consult palliative care services.    I discussed the patient's findings and my recommendations with patient, family, & Dr. Nelson.    VTE Prophylaxis - SCDs.  Code Status - Full code. Discussed with & confirmed with POA--Phillip Peck--via phone for now  until POA discusses with additional family members       JOAQUIN Wilkins  [facility]  07/17/23  15:22 EDT

## 2023-07-17 NOTE — ED TRIAGE NOTES
Pt arrives via EMS from home. States that family has not been able to get a hold of him for the last 2 days. Pt was found down today. Unable to tell EMS how he fell or what happened. Pt has an indwelling edge and has blood in the catheter bag. Pt has catheter because his prostate was removed. Pt is A/Ox3, confused to situation.

## 2023-07-17 NOTE — ED PROVIDER NOTES
EMERGENCY DEPARTMENT ENCOUNTER    Room Number:  19/19  PCP: Brandon Rosen APRN  Historian: Patient and EMS      HPI:  Chief Complaint: Weakness fall  A complete HPI/ROS/PMH/PSH/SH/FH are unobtainable due to: Confusion  Context: Pedro Peck is a 73 y.o. male who presents to the ED c/o weakness and fall.  Patient has history of recent prostatectomy.  Last time patient was seen was 2 days ago.  They have been unable to get a hold of him.  Patient was found on the floor this morning patient has indwelling catheter and urine that does look very brown.  Patient has had no vomiting or diarrhea.  Patient not really sure what happened or when he fell.  Has no chest pain or abdominal pain.  Patient's sister has arrived and she states he has been gradually getting more confused.  States it is probably been more like for 5 days that he was last seen.            PAST MEDICAL HISTORY  Active Ambulatory Problems     Diagnosis Date Noted    DERECK (acute kidney injury) 11/08/2021    Severe malnutrition 11/10/2021    Vitamin D deficiency 11/11/2021    Hypophosphatemia 11/13/2021    Posterior vitreous detachment 12/09/2021    Age-related nuclear cataract of both eyes 12/09/2021     Resolved Ambulatory Problems     Diagnosis Date Noted    No Resolved Ambulatory Problems     Past Medical History:   Diagnosis Date    Autism     Falls          PAST SURGICAL HISTORY  Past Surgical History:   Procedure Laterality Date    PROSTATECTOMY           FAMILY HISTORY  History reviewed. No pertinent family history.      SOCIAL HISTORY  Social History     Socioeconomic History    Marital status: Single   Tobacco Use    Smoking status: Never    Smokeless tobacco: Never   Substance and Sexual Activity    Alcohol use: Not Currently    Drug use: Never    Sexual activity: Defer         ALLERGIES  Patient has no known allergies.        REVIEW OF SYSTEMS  Review of Systems   Weakness      PHYSICAL EXAM  ED Triage Vitals [07/17/23 0957]   Temp Heart  Rate Resp BP SpO2   97.6 °F (36.4 °C) 120 20 105/56 95 %      Temp src Heart Rate Source Patient Position BP Location FiO2 (%)   Tympanic Monitor Lying -- --       Physical Exam      GENERAL: no acute distress.  Confused and cannot give specifics to what happened  HENT: nares patent  EYES: no scleral icterus  CV: regular rhythm, normal rate  RESPIRATORY: normal effort  ABDOMEN: soft.  Urinary catheter in place with brown urine  MUSCULOSKELETAL: no deformity  NEURO: alert, moves all extremities, follows commands  PSYCH:  calm, cooperative  SKIN: warm, dry    Vital signs and nursing notes reviewed.          LAB RESULTS  Recent Results (from the past 24 hour(s))   ECG 12 Lead Altered Mental Status    Collection Time: 07/17/23 10:27 AM   Result Value Ref Range    QT Interval 339 ms   Comprehensive Metabolic Panel    Collection Time: 07/17/23 10:34 AM    Specimen: Blood   Result Value Ref Range    Glucose 69 65 - 99 mg/dL    BUN 89 (H) 8 - 23 mg/dL    Creatinine 6.58 (H) 0.76 - 1.27 mg/dL    Sodium 141 136 - 145 mmol/L    Potassium 5.5 (H) 3.5 - 5.2 mmol/L    Chloride 105 98 - 107 mmol/L    CO2 18.0 (L) 22.0 - 29.0 mmol/L    Calcium 8.9 8.6 - 10.5 mg/dL    Total Protein 6.4 6.0 - 8.5 g/dL    Albumin 3.1 (L) 3.5 - 5.2 g/dL    ALT (SGPT) 154 (H) 1 - 41 U/L    AST (SGOT) 430 (H) 1 - 40 U/L    Alkaline Phosphatase 137 (H) 39 - 117 U/L    Total Bilirubin 1.6 (H) 0.0 - 1.2 mg/dL    Globulin 3.3 gm/dL    A/G Ratio 0.9 g/dL    BUN/Creatinine Ratio 13.5 7.0 - 25.0    Anion Gap 18.0 (H) 5.0 - 15.0 mmol/L    eGFR 8.3 (L) >60.0 mL/min/1.73   CK    Collection Time: 07/17/23 10:34 AM    Specimen: Blood   Result Value Ref Range    Creatine Kinase 11,393 (H) 20 - 200 U/L   CBC Auto Differential    Collection Time: 07/17/23 10:34 AM    Specimen: Blood   Result Value Ref Range    WBC 29.24 (H) 3.40 - 10.80 10*3/mm3    RBC 4.75 4.14 - 5.80 10*6/mm3    Hemoglobin 14.2 13.0 - 17.7 g/dL    Hematocrit 42.2 37.5 - 51.0 %    MCV 88.8 79.0 - 97.0  fL    MCH 29.9 26.6 - 33.0 pg    MCHC 33.6 31.5 - 35.7 g/dL    RDW 13.9 12.3 - 15.4 %    RDW-SD 44.6 37.0 - 54.0 fl    MPV 12.1 (H) 6.0 - 12.0 fL    Platelets 53 (L) 140 - 450 10*3/mm3   Green Top (Gel)    Collection Time: 07/17/23 10:34 AM   Result Value Ref Range    Extra Tube Hold for add-ons.    Lavender Top    Collection Time: 07/17/23 10:34 AM   Result Value Ref Range    Extra Tube hold for add-on    Gold Top - SST    Collection Time: 07/17/23 10:34 AM   Result Value Ref Range    Extra Tube Hold for add-ons.    Light Blue Top    Collection Time: 07/17/23 10:34 AM   Result Value Ref Range    Extra Tube Hold for add-ons.    Manual Differential    Collection Time: 07/17/23 10:34 AM    Specimen: Blood   Result Value Ref Range    Neutrophil % 93.9 (H) 42.7 - 76.0 %    Lymphocyte % 2.0 (L) 19.6 - 45.3 %    Monocyte % 4.0 (L) 5.0 - 12.0 %    Neutrophils Absolute 27.46 (H) 1.70 - 7.00 10*3/mm3    Lymphocytes Absolute 0.58 (L) 0.70 - 3.10 10*3/mm3    Monocytes Absolute 1.17 (H) 0.10 - 0.90 10*3/mm3    Poikilocytes Mod/2+ None Seen    WBC Morphology Normal Normal    Platelet Morphology Normal Normal   Urinalysis With Microscopic If Indicated (No Culture) - Indwelling Urethral Catheter    Collection Time: 07/17/23 10:38 AM    Specimen: Indwelling Urethral Catheter; Urine   Result Value Ref Range    Color, UA Red (A) Yellow, Straw    Appearance, UA Cloudy (A) Clear    pH, UA 7.5 5.0 - 8.0    Specific Gravity, UA 1.020 1.005 - 1.030    Glucose, UA Negative Negative    Ketones, UA Trace (A) Negative    Bilirubin, UA Moderate (2+) (A) Negative    Blood, UA Large (3+) (A) Negative    Protein, UA >=300 mg/dL (3+) (A) Negative    Leuk Esterase, UA Large (3+) (A) Negative    Nitrite, UA Positive (A) Negative    Urobilinogen, UA 1.0 E.U./dL 0.2 - 1.0 E.U./dL   Urinalysis, Microscopic Only - Indwelling Urethral Catheter    Collection Time: 07/17/23 10:38 AM    Specimen: Indwelling Urethral Catheter; Urine   Result Value Ref Range     RBC, UA Too Numerous to Count (A) None Seen, 0-2 /HPF    WBC, UA Unable to determine due to loaded field (A) None Seen, 0-2 /HPF    Bacteria, UA Unable to determine due to loaded field (A) None Seen /HPF    Squamous Epithelial Cells, UA Unable to determine due to loaded field (A) None Seen, 0-2 /HPF    Hyaline Casts, UA Unable to determine due to loaded field None Seen /LPF    Methodology Automated Microscopy    Lactic Acid, Plasma    Collection Time: 07/17/23 11:51 AM    Specimen: Blood   Result Value Ref Range    Lactate 2.4 (C) 0.5 - 2.0 mmol/L       Ordered the above labs and reviewed the results.        RADIOLOGY  CT Abdomen Pelvis Without Contrast    Result Date: 7/17/2023  EXAMINATION: CT OF THE ABDOMEN AND PELVIS WITHOUT CONTRAST  TECHNIQUE: Computed tomography of the abdomen and pelvis without intravenous contrast per protocol. Radiation dose reduction techniques were utilized, including automated exposure control and exposure modulation based on body size.  HISTORY: Hematuria  COMPARISON: None available  FINDINGS: Limited evaluation of the inferior thorax demonstrates atelectasis, without consolidation, pleural effusion, or pneumothorax. The heart is normal in size, without pericardial effusion. Enlarged lymph nodes are seen near the distal esophagus, measuring up to 1.2 cm in short axis diameter on series 2, image 6.  The examination is limited by patient motion. Likely parapelvic cysts are seen in the left kidney. The right kidney is atrophic. There is urinary bladder distention and trabeculation. Mild right-sided hydronephrosis is seen. No left-sided hydronephrosis is demonstrated. There is layering material in the urinary bladder. The urinary bladder is markedly distended.  The liver, spleen, adrenal glands, pancreas, stomach, small bowel, large bowel, and abdominal vasculature are normal as evaluated on this noncontrast examination. There are right greater than left fat-containing inguinal hernias.  No intraperitoneal fluid collection or free gas are seen. No enlarged lymph nodes are demonstrated in the abdomen or pelvis.  Bone windows demonstrate degenerative changes, without suspicious osseous lesion seen.      1. Lower thoracic lymphadenopathy. Correlate with history and physical exam 2. Limited examination of the kidneys demonstrate parapelvic cyst on the left and renal atrophy with mild hydronephrosis on the right 3. Layering material in the urinary bladder. Correlate with urinalysis 4. Thick-walled, trabeculated urinary bladder, suggestive of outlet obstruction 5. Urinary bladder distention. Consider catheterization 6. Additional incidental findings as above.  This report was finalized on 7/17/2023 1:33 PM by Dr. Eran Jansen M.D.      CT Head Without Contrast    Result Date: 7/17/2023  CT HEAD WITHOUT CONTRAST  HISTORY: Found on floor.  COMPARISON: None.  FINDINGS: Patient's head is canted in the scanner. There is age-appropriate atrophy. Mild-to-moderate vascular calcifications involving the carotid siphons are noted. There is no evidence of hemorrhage, hydrocephalus or of a focal area of decreased attenuation to suggest acute infarction. Further evaluation could be performed with MRI examination of brain as indicated.    Radiation dose reduction techniques were utilized, including automated exposure control and exposure modulation based on body size.        Ordered the above noted radiological studies.  CT head independently interpreted by me and shows no evidence of bleeding          PROCEDURES  Critical Care  Performed by: Jai Fregoso MD  Authorized by: Travis Nelson MD     Critical care provider statement:     Critical care time (minutes):  45    Critical care time was exclusive of:  Separately billable procedures and treating other patients    Critical care was necessary to treat or prevent imminent or life-threatening deterioration of the following conditions:  Renal failure, shock  and sepsis    Critical care was time spent personally by me on the following activities:  Discussions with primary provider, ordering and review of radiographic studies and ordering and review of laboratory studies    EKG          EKG time: 1027  Rhythm/Rate: Sinus tachycardia 106  P waves and AR: Normal P waves  QRS, axis: Normal QRS  ST and T waves: Nonspecific ST-T wave.  Artifact is present    Interpreted Contemporaneously by me, independently viewed  Unchanged compared to prior 11/8/2021        MEDICATIONS GIVEN IN ER  Medications   sodium chloride 0.9 % flush 10 mL (has no administration in time range)   sodium chloride 0.9 % flush 10 mL (has no administration in time range)   sodium chloride 0.9 % infusion 40 mL (has no administration in time range)   sennosides-docusate (PERICOLACE) 8.6-50 MG per tablet 2 tablet (has no administration in time range)     And   polyethylene glycol (MIRALAX) packet 17 g (has no administration in time range)     And   bisacodyl (DULCOLAX) EC tablet 5 mg (has no administration in time range)     And   bisacodyl (DULCOLAX) suppository 10 mg (has no administration in time range)   nitroglycerin (NITROSTAT) SL tablet 0.4 mg (has no administration in time range)   acetaminophen (TYLENOL) tablet 650 mg (has no administration in time range)     Or   acetaminophen (TYLENOL) 160 MG/5ML solution 650 mg (has no administration in time range)     Or   acetaminophen (TYLENOL) suppository 650 mg (has no administration in time range)   ondansetron (ZOFRAN) tablet 4 mg (has no administration in time range)     Or   ondansetron (ZOFRAN) injection 4 mg (has no administration in time range)   lactated ringers infusion (has no administration in time range)   sodium chloride 0.9 % bolus 1,000 mL (0 mL Intravenous Stopped 7/17/23 1142)   lactated ringers bolus 1,000 mL (0 mL Intravenous Stopped 7/17/23 1246)   cefTRIAXone (ROCEPHIN) 1,000 mg in sodium chloride 0.9 % 100 mL IVPB-VTB (0 mg Intravenous  Stopped 7/17/23 0063)                   MEDICAL DECISION MAKING, PROGRESS, and CONSULTS    Discussion below represents my analysis of pertinent findings related to patient's condition, differential diagnosis, treatment plan and final disposition.      Additional sources:  - Discussed/ obtained information from independent historians: Significant amount of history obtained from family members who state patient has some dementia    - External (non-ED) record review: Epic reviewed patient admitted in the past for acute kidney injury    - Chronic or social conditions impacting care: None    - Shared decision making: None      Orders placed during this visit:  Orders Placed This Encounter   Procedures    Blood Culture - Blood,    Blood Culture - Blood,    Urine Culture - Urine,    CT Head Without Contrast    CT Abdomen Pelvis Without Contrast    Comprehensive Metabolic Panel    Urinalysis With Microscopic If Indicated (No Culture) - Urine, Clean Catch    CK    CBC Auto Differential    [place] Draw    Manual Differential    Urinalysis, Microscopic Only - Urine, Clean Catch    Lactic Acid, Plasma    Basic Metabolic Panel    CBC (No Diff)    STAT Lactic Acid, Reflex    CK    NPO Diet NPO Type: Sips with Meds    Vital Signs    Intake & Output    Weigh Patient    Oral Care    Place Sequential Compression Device    Maintain Sequential Compression Device    Telemetry - Maintain IV Access    Telemetry - Place Orders & Notify Provider of Results When Patient Experiences Acute Chest Pain, Dysrhythmia or Respiratory Distress    May Be Off Telemetry for Tests    Up With Assistance    Turn Patient    Insert Indwelling Urinary Catheter    Assess Need for Indwelling Urinary Catheter - Follow Removal Protocol    Urinary Catheter Care    Code Status and Medical Interventions:    LHA (on-call MD unless specified) Details    Incentive Spirometry    SLP Consult: Eval & Treat RN Dysphagia Screen Failed    ECG 12 Lead Altered Mental Status     SCANNED - TELEMETRY      SCANNED - TELEMETRY      Insert Peripheral IV    Inpatient Admission    CBC & Differential    Green Top (Gel)    Lavender Top    Gold Top - SST    Light Blue Top         Additional orders considered but not ordered:  None        Differential diagnosis includes but is not limited to:    Rhabdomyolysis, DERECK      Independent interpretation of labs, radiology studies, and discussions with consultants:  ED Course as of 07/17/23 1420   Mon Jul 17, 2023   1245 12:45 EDT  Patient presents for weakness and being on the floor.  It sounds like patient may have been on the floor for at least 5 days patient is in rhabdomyolysis.  Patient is in renal failure.  Has been given IV fluids and last blood pressure was systolic of 115.  Patient has head CT that is negative.  Has been discussed with Dr. Nelson.  Given IV antibiotics for the urine.  Given 2 liters of fluid.  Asking for Ct abdomen.  Will admit. [SL]      ED Course User Index  [SL] Jai Fregoso MD               DIAGNOSIS  Final diagnoses:   Traumatic rhabdomyolysis, initial encounter   EDRECK (acute kidney injury)         DISPOSITION  admit            Latest Documented Vital Signs:  As of 14:20 EDT  BP- 98/69 HR- 89 Temp- 97.6 °F (36.4 °C) (Tympanic) O2 sat- 96%              --    Please note that portions of this were completed with a voice recognition program.       Note Disclaimer: At [facility], we believe that sharing information builds trust and better relationships. You are receiving this note because you are receiving care at [facility] or recently visited. It is possible you will see health information before a provider has talked with you about it. This kind of information can be easy to misunderstand. To help you fully understand what it means for your health, we urge you to discuss this note with your provider.            Jai Fregoso MD  07/17/23 0391

## 2023-07-17 NOTE — ED NOTES
Family is expressing concern that srinivasan stinger is pt's medical and financial poa but lives in [place].

## 2023-07-17 NOTE — ED NOTES
"Nursing report ED to floor  Pedro Peck  73 y.o.  male    HPI :   Chief Complaint   Patient presents with    Fall       Admitting doctor:   Travis Nelson MD    Admitting diagnosis:   The primary encounter diagnosis was Traumatic rhabdomyolysis, initial encounter. A diagnosis of DERECK (acute kidney injury) was also pertinent to this visit.    Code status:   Current Code Status       Date Active Code Status Order ID Comments User Context       7/17/2023 1217 CPR (Attempt to Resuscitate) [id number]  Kamari Pardo APRN ED        Question Answer    Code Status (Patient has no pulse and is not breathing) CPR (Attempt to Resuscitate)    Medical Interventions (Patient has pulse or is breathing) Full Support                    Allergies:   Patient has no known allergies.    Isolation:   No active isolations    Intake and Output  No intake or output data in the 24 hours ending 07/17/23 1317    Weight:       07/17/23  0957   Weight: 65.8 kg (145 lb)       Most recent vitals:   Vitals:    07/17/23 0957 07/17/23 1034 07/17/23 1151 07/17/23 1246   BP: 105/56 91/60 95/62 113/76   Patient Position: Lying      Pulse: 120 (!) 138 88 89   Resp: 20 16 16 16   Temp: 97.6 °F (36.4 °C)      TempSrc: Tympanic      SpO2: 95%  94% 95%   Weight: 65.8 kg (145 lb)      Height: 177.8 cm (70\")          Active LDAs/IV Access:   Lines, Drains & Airways       Active LDAs       Name Placement date Placement time Site Days    Peripheral IV 07/17/23 0956 Left Antecubital 07/17/23 0956  Antecubital  less than 1                    Labs (abnormal labs have a star):   Labs Reviewed   COMPREHENSIVE METABOLIC PANEL - Abnormal; Notable for the following components:       Result Value    BUN 89 (*)     Creatinine 6.58 (*)     Potassium 5.5 (*)     CO2 18.0 (*)     Albumin 3.1 (*)     ALT (SGPT) 154 (*)     AST (SGOT) 430 (*)     Alkaline Phosphatase 137 (*)     Total Bilirubin 1.6 (*)     Anion Gap 18.0 (*)     eGFR 8.3 (*)     All other components " within normal limits    Narrative:     GFR Normal >60  Chronic Kidney Disease <60  Kidney Failure <15    The GFR formula is only valid for adults with stable renal function between ages 18 and 70.   URINALYSIS W/ MICROSCOPIC IF INDICATED (NO CULTURE) - Abnormal; Notable for the following components:    Color, UA Red (*)     Appearance, UA Cloudy (*)     Ketones, UA Trace (*)     Bilirubin, UA Moderate (2+) (*)     Blood, UA Large (3+) (*)     Protein, UA >=300 mg/dL (3+) (*)     Leuk Esterase, UA Large (3+) (*)     Nitrite, UA Positive (*)     All other components within normal limits   CK - Abnormal; Notable for the following components:    Creatine Kinase 11,393 (*)     All other components within normal limits   CBC WITH AUTO DIFFERENTIAL - Abnormal; Notable for the following components:    WBC 29.24 (*)     MPV 12.1 (*)     Platelets 53 (*)     All other components within normal limits   MANUAL DIFFERENTIAL - Abnormal; Notable for the following components:    Neutrophil % 93.9 (*)     Lymphocyte % 2.0 (*)     Monocyte % 4.0 (*)     Neutrophils Absolute 27.46 (*)     Lymphocytes Absolute 0.58 (*)     Monocytes Absolute 1.17 (*)     All other components within normal limits   URINALYSIS, MICROSCOPIC ONLY - Abnormal; Notable for the following components:    RBC, UA Too Numerous to Count (*)     WBC, UA Unable to determine due to loaded field (*)     Bacteria, UA Unable to determine due to loaded field (*)     Squamous Epithelial Cells, UA Unable to determine due to loaded field (*)     All other components within normal limits   LACTIC ACID, PLASMA - Abnormal; Notable for the following components:    Lactate 2.4 (*)     All other components within normal limits   BLOOD CULTURE   BLOOD CULTURE   URINE CULTURE   RAINBOW DRAW    Narrative:     The following orders were created for panel order [place] Draw.  Procedure                               Abnormality         Status                     ---------                                -----------         ------                     Green Top (Gel)[[id number]]                                  Final result               Lavender Top[[id number]]                                     Final result               Gold Top - SST[[id number]]                                   Final result               Light Blue Top[[id number]]                                   Final result                 Please view results for these tests on the individual orders.   LACTIC ACID, REFLEX   CBC AND DIFFERENTIAL    Narrative:     The following orders were created for panel order CBC & Differential.  Procedure                               Abnormality         Status                     ---------                               -----------         ------                     CBC Auto Differential[[id number]]        Abnormal            Final result                 Please view results for these tests on the individual orders.   GREEN TOP   LAVENDER TOP   GOLD TOP - SST   LIGHT BLUE TOP       EKG:   ECG 12 Lead Altered Mental Status   Preliminary Result   HEART RATE= 106  bpm   RR Interval= 566  ms   [place] Interval= 143  ms   P Horizontal Axis= 12  deg   P Front Axis= 53  deg   QRSD Interval= 100  ms   QT Interval= 339  ms   QRS Axis= -48  deg   T Wave Axis= 28  deg   - ABNORMAL ECG -   Sinus tachycardia   LAD, consider left anterior fascicular block   Low voltage, precordial leads   RSR' in V1 or V2, right VCD or RVH   Consider anterior infarct   Artifact in lead(s) V1   Electronically Signed By:    Date and Time of Study: 2023-07-17 10:27:32      SCANNED - TELEMETRY     Final Result      SCANNED - TELEMETRY     Final Result          Meds given in ED:   Medications   sodium chloride 0.9 % flush 10 mL (has no administration in time range)   sodium chloride 0.9 % flush 10 mL (has no administration in time range)   sodium chloride 0.9 % infusion 40 mL (has no administration in time range)   sennosides-docusate (PERICOLACE) 8.6-50  MG per tablet 2 tablet (has no administration in time range)     And   polyethylene glycol (MIRALAX) packet 17 g (has no administration in time range)     And   bisacodyl (DULCOLAX) EC tablet 5 mg (has no administration in time range)     And   bisacodyl (DULCOLAX) suppository 10 mg (has no administration in time range)   nitroglycerin (NITROSTAT) SL tablet 0.4 mg (has no administration in time range)   acetaminophen (TYLENOL) tablet 650 mg (has no administration in time range)     Or   acetaminophen (TYLENOL) 160 MG/5ML solution 650 mg (has no administration in time range)     Or   acetaminophen (TYLENOL) suppository 650 mg (has no administration in time range)   ondansetron (ZOFRAN) tablet 4 mg (has no administration in time range)     Or   ondansetron (ZOFRAN) injection 4 mg (has no administration in time range)   sodium chloride 0.9 % bolus 1,000 mL (0 mL Intravenous Stopped 7/17/23 1142)   lactated ringers bolus 1,000 mL (0 mL Intravenous Stopped 7/17/23 1246)   cefTRIAXone (ROCEPHIN) 1,000 mg in sodium chloride 0.9 % 100 mL IVPB-VTB (1,000 mg Intravenous New Bag 7/17/23 1245)       Imaging results:  No radiology results for the last day    Ambulatory status:   - bedrest     Social issues:   Social History     Socioeconomic History    Marital status: Single   Tobacco Use    Smoking status: Never    Smokeless tobacco: Never   Substance and Sexual Activity    Alcohol use: Not Currently    Drug use: Never    Sexual activity: Defer       NIH Stroke Scale:       Mariana Bull RN  07/17/23 13:17 EDT

## 2023-07-17 NOTE — PROGRESS NOTES
Clinical Pharmacy Services: Medication History    Pedro Peck is a 73 y.o. male presenting to [facility] for   Chief Complaint   Patient presents with    Fall       He  has a past medical history of Autism.    Allergies as of 07/17/2023    (No Known Allergies)       Medication information was obtained from: Pharmacy  Pharmacy and Phone Number:   [facility] [place], [place] - [address] - [phone]  - [phone]   [address] [address]  Phone: [phone] Fax: [phone]        Prior to Admission Medications       Prescriptions Last Dose Informant Patient Reported? Taking?    famotidine (PEPCID) 20 MG tablet  Pharmacy Yes Yes    Take 1 tablet by mouth 2 (Two) Times a Day.    mirtazapine (REMERON) 15 MG tablet  Pharmacy No Yes    Take 1 tablet by mouth Every Night for 30 days.              Medication notes:     This medication list is complete to the best of my knowledge as of 7/17/2023    Please call if questions.    Brice Cho  Medication History Technician  [phone]    7/17/2023 11:28 EDT

## 2023-07-18 ENCOUNTER — APPOINTMENT (OUTPATIENT)
Dept: ULTRASOUND IMAGING | Facility: HOSPITAL | Age: 73
DRG: 872 | End: 2023-07-18
Payer: MEDICARE

## 2023-07-18 PROBLEM — R78.81 BACTEREMIA: Status: ACTIVE | Noted: 2023-07-18

## 2023-07-18 PROBLEM — A41.50 SEPSIS DUE TO GRAM NEGATIVE BACTERIA: Status: ACTIVE | Noted: 2023-07-18

## 2023-07-18 PROBLEM — D69.6 THROMBOCYTOPENIA: Status: ACTIVE | Noted: 2023-07-18

## 2023-07-18 LAB
ALBUMIN SERPL-MCNC: 2.4 G/DL (ref 3.5–5.2)
ANION GAP SERPL CALCULATED.3IONS-SCNC: 12 MMOL/L (ref 5–15)
BACTERIA BLD CULT: ABNORMAL
BOTTLE TYPE: ABNORMAL
BUN SERPL-MCNC: 91 MG/DL (ref 8–23)
BUN/CREAT SERPL: 20.2 (ref 7–25)
CALCIUM SPEC-SCNC: 8.2 MG/DL (ref 8.6–10.5)
CHLORIDE SERPL-SCNC: 111 MMOL/L (ref 98–107)
CHLORIDE UR-SCNC: 30 MMOL/L
CK SERPL-CCNC: 3059 U/L (ref 20–200)
CO2 SERPL-SCNC: 18 MMOL/L (ref 22–29)
CREAT SERPL-MCNC: 4.51 MG/DL (ref 0.76–1.27)
CREAT UR-MCNC: 96.3 MG/DL
D-LACTATE SERPL-SCNC: 1.7 MMOL/L (ref 0.5–2)
DEPRECATED RDW RBC AUTO: 45.2 FL (ref 37–54)
EGFRCR SERPLBLD CKD-EPI 2021: 13.1 ML/MIN/1.73
ERYTHROCYTE [DISTWIDTH] IN BLOOD BY AUTOMATED COUNT: 14.2 % (ref 12.3–15.4)
GLUCOSE SERPL-MCNC: 71 MG/DL (ref 65–99)
HCT VFR BLD AUTO: 37.9 % (ref 37.5–51)
HCT VFR BLD AUTO: 38.1 % (ref 37.5–51)
HGB BLD-MCNC: 13.1 G/DL (ref 13–17.7)
HGB BLD-MCNC: 13.4 G/DL (ref 13–17.7)
MCH RBC QN AUTO: 30.8 PG (ref 26.6–33)
MCHC RBC AUTO-ENTMCNC: 34.6 G/DL (ref 31.5–35.7)
MCV RBC AUTO: 89 FL (ref 79–97)
PHOSPHATE SERPL-MCNC: 3.7 MG/DL (ref 2.5–4.5)
PLATELET # BLD AUTO: 45 10*3/MM3 (ref 140–450)
PMV BLD AUTO: 12.2 FL (ref 6–12)
POTASSIUM SERPL-SCNC: 4.6 MMOL/L (ref 3.5–5.2)
RBC # BLD AUTO: 4.26 10*6/MM3 (ref 4.14–5.8)
SODIUM SERPL-SCNC: 141 MMOL/L (ref 136–145)
SODIUM UR-SCNC: 36 MMOL/L
URATE SERPL-MCNC: 9.8 MG/DL (ref 3.4–7)
URINE MYOGLOBIN, QUALITATIVE: POSITIVE
WBC NRBC COR # BLD: 18.42 10*3/MM3 (ref 3.4–10.8)

## 2023-07-18 PROCEDURE — 85014 HEMATOCRIT: CPT | Performed by: NURSE PRACTITIONER

## 2023-07-18 PROCEDURE — 97535 SELF CARE MNGMENT TRAINING: CPT

## 2023-07-18 PROCEDURE — 80069 RENAL FUNCTION PANEL: CPT | Performed by: INTERNAL MEDICINE

## 2023-07-18 PROCEDURE — 82550 ASSAY OF CK (CPK): CPT | Performed by: INTERNAL MEDICINE

## 2023-07-18 PROCEDURE — 92610 EVALUATE SWALLOWING FUNCTION: CPT

## 2023-07-18 PROCEDURE — 84550 ASSAY OF BLOOD/URIC ACID: CPT | Performed by: INTERNAL MEDICINE

## 2023-07-18 PROCEDURE — 84300 ASSAY OF URINE SODIUM: CPT | Performed by: INTERNAL MEDICINE

## 2023-07-18 PROCEDURE — 97530 THERAPEUTIC ACTIVITIES: CPT

## 2023-07-18 PROCEDURE — 83874 ASSAY OF MYOGLOBIN: CPT | Performed by: INTERNAL MEDICINE

## 2023-07-18 PROCEDURE — 97162 PT EVAL MOD COMPLEX 30 MIN: CPT

## 2023-07-18 PROCEDURE — 82436 ASSAY OF URINE CHLORIDE: CPT | Performed by: INTERNAL MEDICINE

## 2023-07-18 PROCEDURE — 85027 COMPLETE CBC AUTOMATED: CPT | Performed by: NURSE PRACTITIONER

## 2023-07-18 PROCEDURE — 97166 OT EVAL MOD COMPLEX 45 MIN: CPT

## 2023-07-18 PROCEDURE — 83605 ASSAY OF LACTIC ACID: CPT | Performed by: EMERGENCY MEDICINE

## 2023-07-18 PROCEDURE — 0 CEFEPIME PER 500 MG: Performed by: INTERNAL MEDICINE

## 2023-07-18 PROCEDURE — 85018 HEMOGLOBIN: CPT | Performed by: NURSE PRACTITIONER

## 2023-07-18 PROCEDURE — 82570 ASSAY OF URINE CREATININE: CPT | Performed by: INTERNAL MEDICINE

## 2023-07-18 PROCEDURE — 76775 US EXAM ABDO BACK WALL LIM: CPT

## 2023-07-18 PROCEDURE — 97116 GAIT TRAINING THERAPY: CPT

## 2023-07-18 RX ORDER — MIRTAZAPINE 15 MG/1
15 TABLET, FILM COATED ORAL NIGHTLY
Status: DISCONTINUED | OUTPATIENT
Start: 2023-07-18 | End: 2023-07-22 | Stop reason: HOSPADM

## 2023-07-18 RX ORDER — FAMOTIDINE 20 MG/1
20 TABLET, FILM COATED ORAL 2 TIMES DAILY
Status: DISCONTINUED | OUTPATIENT
Start: 2023-07-18 | End: 2023-07-22 | Stop reason: HOSPADM

## 2023-07-18 RX ADMIN — CEFEPIME 2000 MG: 2 INJECTION, POWDER, FOR SOLUTION INTRAVENOUS at 23:56

## 2023-07-18 RX ADMIN — CEFEPIME 2000 MG: 2 INJECTION, POWDER, FOR SOLUTION INTRAVENOUS at 10:33

## 2023-07-18 RX ADMIN — SODIUM BICARBONATE: 84 INJECTION, SOLUTION INTRAVENOUS at 00:42

## 2023-07-18 RX ADMIN — SENNOSIDES AND DOCUSATE SODIUM 2 TABLET: 50; 8.6 TABLET ORAL at 10:32

## 2023-07-18 RX ADMIN — MIRTAZAPINE 15 MG: 15 TABLET, FILM COATED ORAL at 19:30

## 2023-07-18 RX ADMIN — SODIUM BICARBONATE: 84 INJECTION, SOLUTION INTRAVENOUS at 10:32

## 2023-07-18 RX ADMIN — Medication 10 ML: at 10:34

## 2023-07-18 RX ADMIN — FAMOTIDINE 20 MG: 20 TABLET, FILM COATED ORAL at 19:30

## 2023-07-18 NOTE — THERAPY EVALUATION
Patient Name: Pedro Peck  : 1950    MRN: [id number]                              Today's Date: 2023       Admit Date: 2023    Visit Dx:     ICD-10-CM ICD-9-CM   1. Traumatic rhabdomyolysis, initial encounter  T79.6XXA 958.6   2. DERECK (acute kidney injury)  N17.9 584.9     Patient Active Problem List   Diagnosis    DERECK (acute kidney injury)    Severe malnutrition    Vitamin D deficiency    Hypophosphatemia    Posterior vitreous detachment    Age-related nuclear cataract of both eyes    Rhabdomyolysis    Sepsis secondary to UTI    Acute cystitis without hematuria    Diarrhea    Sepsis due to Gram negative bacteria    Bacteremia    Thrombocytopenia     Past Medical History:   Diagnosis Date    Autism     Falls      Past Surgical History:   Procedure Laterality Date    PROSTATECTOMY        General Information       Row Name 23 [facility]          Physical Therapy Time and Intention    Document Type evaluation  -     Mode of Treatment co-treatment;physical therapy;occupational therapy  -       Row Name 23 1516          General Information    Prior Level of Function independent:;gait;transfer;bed mobility  Somewhat confused but per chart, pt was living alone and reports he has a rwx  -     Existing Precautions/Restrictions fall  -     Barriers to Rehab medically complex;previous functional deficit;cognitive status  -       Row Name 23 1516          Living Environment    People in Home alone  -       Row Name 23 1516          Cognition    Orientation Status (Cognition) oriented to;person;place  -       Row Name 23 1516          Safety Issues, Functional Mobility    Impairments Affecting Function (Mobility) balance;cognition;endurance/activity tolerance;strength  -               User Key  (r) = Recorded By, (t) = Taken By, (c) = Cosigned By      Initials Name Provider Type     Maddy La, PT Physical Therapist                   Mobility       Row Name  07/18/23 1517          Bed Mobility    Bed Mobility supine-sit  -     Supine-Sit Newton (Bed Mobility) verbal cues;minimum assist (75% patient effort);1 person assist  -     Sit-Supine Newton (Bed Mobility) not tested  NT - UIC  -     Comment, (Bed Mobility) HHA to pull trunk to sit, heavy cues for sequencing  -       Row Name 07/18/23 1517          Sit-Stand Transfer    Sit-Stand Newton (Transfers) minimum assist (75% patient effort);2 person assist;verbal cues  -     Assistive Device (Sit-Stand Transfers) walker, front-wheeled  -       Row Name 07/18/23 1517          Gait/Stairs (Locomotion)    Newton Level (Gait) minimum assist (75% patient effort);1 person assist;verbal cues;nonverbal cues (demo/gesture)  -     Assistive Device (Gait) walker, front-wheeled  -     Distance in Feet (Gait) 30ft  -     Deviations/Abnormal Patterns (Gait) antalgic;kevyn decreased;festinating/shuffling;gait speed decreased;stride length decreased;weight shifting decreased  -     Bilateral Gait Deviations forward flexed posture  -               User Key  (r) = Recorded By, (t) = Taken By, (c) = Cosigned By      Initials Name Provider Type     Maddy La, PT Physical Therapist                   Obj/Interventions       [facility] Name 07/18/23 1518          Range of Motion Comprehensive    General Range of Motion bilateral lower extremity ROM WFL  -[facility] Name 07/18/23 1518          Strength Comprehensive (MMT)    General Manual Muscle Testing (MMT) Assessment lower extremity strength deficits identified  -     Comment, General Manual Muscle Testing (MMT) Assessment Generalized weakness  -[facility] Name 07/18/23 1518          Balance    Balance Assessment sitting static balance;sitting dynamic balance;standing static balance;standing dynamic balance  -     Static Sitting Balance standby assist  -     Dynamic Sitting Balance standby assist  -     Position, Sitting Balance  unsupported;sitting edge of bed  -     Static Standing Balance contact guard;verbal cues  -     Dynamic Standing Balance minimal assist;verbal cues  -     Position/Device Used, Standing Balance supported;walker, front-wheeled  -     Balance Interventions sitting;standing;sit to stand;static;supported;dynamic  -BH       Row Name 07/18/23 1518          Sensory Assessment (Somatosensory)    Sensory Assessment (Somatosensory) LE sensation intact  -               User Key  (r) = Recorded By, (t) = Taken By, (c) = Cosigned By      Initials Name Provider Type     Maddy La PT Physical Therapist                   Goals/Plan       Row Name 07/18/23 1524          Bed Mobility Goal 1 (PT)    Activity/Assistive Device (Bed Mobility Goal 1, PT) bed mobility activities, all  -     Tompkins Level/Cues Needed (Bed Mobility Goal 1, PT) standby assist  -     Time Frame (Bed Mobility Goal 1, PT) 1 week  -BH       Row Name 07/18/23 1524          Transfer Goal 1 (PT)    Activity/Assistive Device (Transfer Goal 1, PT) transfers, all  -     Tompkins Level/Cues Needed (Transfer Goal 1, PT) standby assist  -     Time Frame (Transfer Goal 1, PT) 1 week  -BH       Row Name 07/18/23 1524          Gait Training Goal 1 (PT)    Activity/Assistive Device (Gait Training Goal 1, PT) gait (walking locomotion)  -     Tompkins Level (Gait Training Goal 1, PT) standby assist  -     Distance (Gait Training Goal 1, PT) 50ft  -     Time Frame (Gait Training Goal 1, PT) 1 week  -BH       Row Name 07/18/23 1525          Therapy Assessment/Plan (PT)    Planned Therapy Interventions (PT) balance training;bed mobility training;gait training;home exercise program;patient/family education;strengthening;ROM (range of motion);transfer training  -               User Key  (r) = Recorded By, (t) = Taken By, (c) = Cosigned By      Initials Name Provider Type     Maddy La PT Physical Therapist                    Clinical Impression       Row Name 07/18/23 1520          Pain    Pretreatment Pain Rating 0/10 - no pain  -     Posttreatment Pain Rating 0/10 - no pain  -       Row Name 07/18/23 1520          Plan of Care Review    Plan of Care Reviewed With patient  -     Outcome Evaluation Pt iis a 72 yo M admitted after being found down in his home - per chart was on floor for possibly 5 days or so. Work-up revealed DERECK with sepsis 2/2 UTI. Pt confused on eval, states he has a rwx at home but unable to give much history and unsure of accuracy. Per chart, pt lives alone and his family checks on him. Pt presents to PT with impaired strength, endurance, and balance limiting overall mobility. Pt transferred to EOB with min A x1, stood with min A x2, and ambulated 30ft with rwx and min A. Pt with some difficulty with rwx navigation - needing cues and manual assist for obstacle avoidance. Pt fatigues quickly but no overt LOB with mobility today. Pt agreeable to sitting UIC, assisted with repositioning and left with needs met. PT will continue to follow to progress mobility as tolerated. Anticipate DC to SNF.  -       Row Name 07/18/23 1520          Therapy Assessment/Plan (PT)    Patient/Family Therapy Goals Statement (PT) Return to [facility]  -     Rehab Potential (PT) good, to achieve stated therapy goals  -     Criteria for Skilled Interventions Met (PT) yes  -     Therapy Frequency (PT) 5 times/wk  [facility] Name 07/18/23 1520          Vital Signs    O2 Delivery Pre Treatment room air  -     O2 Delivery Intra Treatment room air  -     O2 Delivery Post Treatment room air  -[facility] Name 07/18/23 1520          Positioning and Restraints    Pre-Treatment Position in bed  -     Post Treatment Position chair  -     In Chair reclined;call light within reach;notified nsg;encouraged to call for assist;exit alarm on  -               User Key  (r) = Recorded By, (t) = Taken By, (c) = Cosigned By      Initials  Name Provider Type     Maddy La PT Physical Therapist                   Outcome Measures       Row Name 07/18/23 1525          How much help from another person do you currently need...    Turning from your back to your side while in flat bed without using bedrails? 3  -BH     Moving from lying on back to sitting on the side of a flat bed without bedrails? 3  -BH     Moving to and from a bed to a chair (including a wheelchair)? 3  -BH     Standing up from a chair using your arms (e.g., wheelchair, bedside chair)? 3  -BH     Climbing 3-5 steps with a railing? 2  -BH     To walk in hospital room? 2  -BH     AM-PAC 6 Clicks Score (PT) 16  -BH     Highest level of mobility 5 --> Static standing  -       Row Name 07/18/23 1525          Functional Assessment    Outcome Measure Options AM-PAC 6 Clicks Basic Mobility (PT)  -               User Key  (r) = Recorded By, (t) = Taken By, (c) = Cosigned By      Initials Name Provider Type     Maddy La PT Physical Therapist                                 Physical Therapy Education       Title: PT OT SLP Therapies (In Progress)       Topic: Physical Therapy (Done)       Point: Mobility training (Done)       Learning Progress Summary             Patient Acceptance, E,TB,D, NR,VU by  at 7/18/2023 1525                         Point: Home exercise program (Done)       Learning Progress Summary             Patient Acceptance, E,TB,D, NR,VU by  at 7/18/2023 1525                         Point: Body mechanics (Done)       Learning Progress Summary             Patient Acceptance, E,TB,D, NR,VU by  at 7/18/2023 1525                         Point: Precautions (Done)       Learning Progress Summary             Patient Acceptance, E,TB,D, NR,VU by  at 7/18/2023 1525                                         User Key       Initials Effective Dates Name Provider Type [facility] 04/08/22 -  Maddy La PT Physical Therapist PT                  PT  Recommendation and Plan  Planned Therapy Interventions (PT): balance training, bed mobility training, gait training, home exercise program, patient/family education, strengthening, ROM (range of motion), transfer training  Plan of Care Reviewed With: patient  Outcome Evaluation: Pt iis a 74 yo M admitted after being found down in his home - per chart was on floor for possibly 5 days or so. Work-up revealed DERECK with sepsis 2/2 UTI. Pt confused on eval, states he has a rwx at home but unable to give much history and unsure of accuracy. Per chart, pt lives alone and his family checks on him. Pt presents to PT with impaired strength, endurance, and balance limiting overall mobility. Pt transferred to EOB with min A x1, stood with min A x2, and ambulated 30ft with rwx and min A. Pt with some difficulty with rwx navigation - needing cues and manual assist for obstacle avoidance. Pt fatigues quickly but no overt LOB with mobility today. Pt agreeable to sitting UIC, assisted with repositioning and left with needs met. PT will continue to follow to progress mobility as tolerated. Anticipate DC to SNF.     Time Calculation:   PT Evaluation Complexity  History, PT Evaluation Complexity: 1-2 personal factors and/or comorbidities  Examination of Body Systems (PT Eval Complexity): total of 3 or more elements  Clinical Presentation (PT Evaluation Complexity): evolving  Clinical Decision Making (PT Evaluation Complexity): moderate complexity  Overall Complexity (PT Evaluation Complexity): moderate complexity     PT Charges       Row Name 07/18/23 1525             Time Calculation    Start Time 1330  -      Stop Time 1355  -      Time Calculation (min) 25 min  -      PT Received On 07/18/23  -      PT - Next Appointment 07/19/23  -      PT Goal Re-Cert Due Date 07/25/23  -         Time Calculation- PT    Total Timed Code Minutes- PT 23 minute(s)  -         Timed Charges    32333 - Gait Training Minutes  10  -       70733 - PT Therapeutic Activity Minutes 13  -BH         Untimed Charges    PT Eval/Re-eval Minutes 5  -BH         Total Minutes    Timed Charges Total Minutes 23  -BH      Untimed Charges Total Minutes 5  -BH       Total Minutes 28  -BH                User Key  (r) = Recorded By, (t) = Taken By, (c) = Cosigned By      Initials Name Provider Type     Maddy La, PT Physical Therapist                  Therapy Charges for Today       Code Description Service Date Service Provider Modifiers Qty    95542422031 HC GAIT TRAINING EA 15 MIN 7/18/2023 Maddy La, PT GP 1    26501743557 HC PT THERAPEUTIC ACT EA 15 MIN 7/18/2023 Maddy La, PT GP 1    55216773851 HC PT EVAL MOD COMPLEXITY 3 7/18/2023 Maddy La, PT GP 1            PT G-Codes  Outcome Measure Options: AM-PAC 6 Clicks Basic Mobility (PT)  AM-PAC 6 Clicks Score (PT): 16  PT Discharge Summary  Anticipated Discharge Disposition (PT): skilled nursing facility    Maddy La PT  7/18/2023

## 2023-07-18 NOTE — CONSULTS
[facility]                                                                                             Nephrology Initial Consult Note    Patient Identification:  Name: Pedro Peck MRN: [id number]  Age: 73 y.o. : 1950  Sex: male  Date:2023    Requesting Physician: As per consult order.  Reason for Consultation: Severe renal failure, acidosis and rhabdomyolysis  Information from:patient/ family/ chart      History of Present Illness: This is a 73 y.o. year old male  with no prior history of chronic kidney disease.  Did have DERECK in  that improved and creatinine was near baseline, 1.1 at discharge.  Medical history significant for autism who presented to the ER with a fall.  Patient lives alone but family went to check on him as he was not answering his phone.  Was found down on the floor, confused, lying in feces.  Initial labs significant for severe renal failure, creatinine 6.5, CK greater than 11,000 with UTI.  He was hypotensive into the 90s and was fluid resuscitated in the ER per sepsis protocol and started on IV antibiotics.  BUN 89, potassium 5.5 last night in the ER      The following medical history and medications personally reviewed by me:    Problem List:     DERECK (acute kidney injury)    Rhabdomyolysis    Sepsis secondary to UTI    Acute cystitis without hematuria    Diarrhea      Past Medical History:  Past Medical History:   Diagnosis Date    Autism     Falls        Past Surgical History:  Past Surgical History:   Procedure Laterality Date    PROSTATECTOMY          Home Meds:   Medications Prior to Admission   Medication Sig Dispense Refill Last Dose    famotidine (PEPCID) 20 MG tablet Take 1 tablet by mouth 2 (Two) Times a Day.       mirtazapine (REMERON) 15 MG tablet Take 1 tablet by mouth Every Night for 30 days. 30 tablet 0        Current Meds:   Current Facility-Administered  Medications   Medication Dose Route Frequency Provider Last Rate Last Admin    acetaminophen (TYLENOL) tablet 650 mg  650 mg Oral Q4H PRN Kamari Pardo APRN        Or    acetaminophen (TYLENOL) 160 MG/5ML solution 650 mg  650 mg Oral Q4H PRN Kamari Pardo APRN        Or    acetaminophen (TYLENOL) suppository 650 mg  650 mg Rectal Q4H PRN Kamari Pardo APRN        sennosides-docusate (PERICOLACE) 8.6-50 MG per tablet 2 tablet  2 tablet Oral BID Kamari Pardo APRN        And    polyethylene glycol (MIRALAX) packet 17 g  17 g Oral Daily PRN Kamari Pardo APRN        And    bisacodyl (DULCOLAX) EC tablet 5 mg  5 mg Oral Daily PRN Kamari Pardo APRN        And    bisacodyl (DULCOLAX) suppository 10 mg  10 mg Rectal Daily PRN Kamari Pardo APRN        cefTRIAXone (ROCEPHIN) 2,000 mg in sodium chloride 0.9 % 100 mL IVPB-VTB  2,000 mg Intravenous Q24H Travis Nelson MD        nitroglycerin (NITROSTAT) SL tablet 0.4 mg  0.4 mg Sublingual Q5 Min PRN Kamari Pardo APRN        ondansetron (ZOFRAN) tablet 4 mg  4 mg Oral Q6H PRN Kamari Pardo APRN        Or    ondansetron (ZOFRAN) injection 4 mg  4 mg Intravenous Q6H PRN Kamari Pardo APRN        sodium chloride 0.45 % 925 mL with sodium bicarbonate 8.4 % 75 mEq infusion   Intravenous Continuous Brian Farrar  mL/hr at 07/18/23 0042 New Bag at 07/18/23 0042    sodium chloride 0.9 % flush 10 mL  10 mL Intravenous Q12H Kamari Pardo APRN   10 mL at 07/17/23 1614    sodium chloride 0.9 % flush 10 mL  10 mL Intravenous PRN Kamari Pardo APRN        sodium chloride 0.9 % infusion 40 mL  40 mL Intravenous PRN Kamari Pardo APRN           Allergies:  No Known Allergies    Social History:   Social History     Socioeconomic History    Marital status: Single   Tobacco Use    Smoking status: Never    Smokeless tobacco: Never   Vaping Use    Vaping Use: Never used   Substance and Sexual Activity    Alcohol use: Not Currently    Drug  "use: Never    Sexual activity: Defer        Family History:  History reviewed. No pertinent family history.     Review of Systems: as per HPI, in addition:    General:      Complains of weakness / fatigue,                       No fevers / chills                       no weight loss  HEENT:       no dysphagia / odynophagia  Neck:           normal range of motion, no swelling  Respiratory: no cough / congestion                      No shortness of air                       No wheezing  CV:              No chest pain                       No palpitations  Abdomen/GI: no nausea / vomiting                      No diarrhea / constipation                      No abdominal pain  :             no dysuria / urinary frequency                       No urgency, normal output  Endocrine:   no polyuria / polydipsia,                      No heat or cold intolerance  Skin:           no rashes or skin breakdown   Vascular:   No edema                     No claudication  Psych:        no depression/ anxiety  Neuro:        no focal weakness, no seizures  Musculoskeletal: Diffuse muscle soreness      Physical Exam:  Vitals:   Temp (24hrs), Av.7 °F (36.5 °C), Min:97.5 °F (36.4 °C), Max:97.9 °F (36.6 °C)    BP 95/63   Pulse 73   Temp 97.9 °F (36.6 °C)   Resp 16   Ht 177.8 cm (70\")   Wt 65.8 kg (145 lb)   SpO2 96%   BMI 20.81 kg/m²   Intake/Output:     Intake/Output Summary (Last 24 hours) at 2023 0758  Last data filed at 2023 2345  Gross per 24 hour   Intake --   Output 2300 ml   Net -2300 ml        Wt Readings from Last 1 Encounters:   23 0957 65.8 kg (145 lb)       Exam:    General Appearance:  Awake, alert, oriented x3, no acute distress  Chronically ill-appearing   Head and Face:  Normocephalic, atraumatic, mucus membranes moist, oropharynx clear   Eyes:  No icterus, pupils equal round and reactive to light, extraocular movements intact    ENMT: Moist mucosa, tongue symmetric    Neck: Supple  no jugular " venous distention  no thyromegaly   Pulmonary:  Respiratory effort: Normal  Auscultation of lungs: Clear bilaterally  No wheezes  No rhonchi  Good air movement, good expansion   Chest wall:  No tenderness or deformity   Cardiovascular:  Auscultation of the heart: Normal rhythm, no murmurs  Trace edema of bilateral lower extremities   Abdomen:  Abdomen: soft, non-tender, normal bowel sounds all four quadrants, no masses   Liver and spleen: no hepatosplenomegaly   Musculoskeletal: Digits and nails: normal  Normal range of motion  No joint swelling or gross deformities    Skin: Skin inspection: color normal, no visible rashes or lesions  Skin palpation: texture, turgor normal, no palpable lesions   Lymphatic:  no cervical lymphadenopathy    Psychiatric: Judgement and insight: normal  Orientation to person place and time: normal  Mood and affect: normal       DATA:  Radiology and Labs:  The following labs independently reviewed by me, additional AM labs ordered  Old records independently reviewed showing baseline creatinine around 1.1  The following radiologic studies independently viewed by me, findings CT abdomen pelvis showed thoracic lymphadenopathy limited kidney evaluation but there was a parapelvic cyst and mild right hydronephrosis thick-walled bladder which was distended  Interval notes, chart personally reviewed by me.  I have reviewed and summarized old records as detailed above  Plan of care discussed with patient himself at bedside  New problems include renal failure, metabolic acidosis, urinary retention with UTI and sepsis      Risk/ complexity of medical care/ medical decision making: Very high risk with severe renal failure, consideration for possible dialysis, multiple critical electrolyte abnormalities, severe rhabdomyolysis and UTI with sepsis requiring prompt treatment      Labs:   Recent Results (from the past 24 hour(s))   ECG 12 Lead Altered Mental Status    Collection Time: 07/17/23 10:27 AM    Result Value Ref Range    QT Interval 339 ms   Comprehensive Metabolic Panel    Collection Time: 07/17/23 10:34 AM    Specimen: Blood   Result Value Ref Range    Glucose 69 65 - 99 mg/dL    BUN 89 (H) 8 - 23 mg/dL    Creatinine 6.58 (H) 0.76 - 1.27 mg/dL    Sodium 141 136 - 145 mmol/L    Potassium 5.5 (H) 3.5 - 5.2 mmol/L    Chloride 105 98 - 107 mmol/L    CO2 18.0 (L) 22.0 - 29.0 mmol/L    Calcium 8.9 8.6 - 10.5 mg/dL    Total Protein 6.4 6.0 - 8.5 g/dL    Albumin 3.1 (L) 3.5 - 5.2 g/dL    ALT (SGPT) 154 (H) 1 - 41 U/L    AST (SGOT) 430 (H) 1 - 40 U/L    Alkaline Phosphatase 137 (H) 39 - 117 U/L    Total Bilirubin 1.6 (H) 0.0 - 1.2 mg/dL    Globulin 3.3 gm/dL    A/G Ratio 0.9 g/dL    BUN/Creatinine Ratio 13.5 7.0 - 25.0    Anion Gap 18.0 (H) 5.0 - 15.0 mmol/L    eGFR 8.3 (L) >60.0 mL/min/1.73   CK    Collection Time: 07/17/23 10:34 AM    Specimen: Blood   Result Value Ref Range    Creatine Kinase 11,393 (H) 20 - 200 U/L   CBC Auto Differential    Collection Time: 07/17/23 10:34 AM    Specimen: Blood   Result Value Ref Range    WBC 29.24 (H) 3.40 - 10.80 10*3/mm3    RBC 4.75 4.14 - 5.80 10*6/mm3    Hemoglobin 14.2 13.0 - 17.7 g/dL    Hematocrit 42.2 37.5 - 51.0 %    MCV 88.8 79.0 - 97.0 fL    MCH 29.9 26.6 - 33.0 pg    MCHC 33.6 31.5 - 35.7 g/dL    RDW 13.9 12.3 - 15.4 %    RDW-SD 44.6 37.0 - 54.0 fl    MPV 12.1 (H) 6.0 - 12.0 fL    Platelets 53 (L) 140 - 450 10*3/mm3   Green Top (Gel)    Collection Time: 07/17/23 10:34 AM   Result Value Ref Range    Extra Tube Hold for add-ons.    Lavender Top    Collection Time: 07/17/23 10:34 AM   Result Value Ref Range    Extra Tube hold for add-on    Gold Top - SST    Collection Time: 07/17/23 10:34 AM   Result Value Ref Range    Extra Tube Hold for add-ons.    Light Blue Top    Collection Time: 07/17/23 10:34 AM   Result Value Ref Range    Extra Tube Hold for add-ons.    Manual Differential    Collection Time: 07/17/23 10:34 AM    Specimen: Blood   Result Value Ref Range     Neutrophil % 93.9 (H) 42.7 - 76.0 %    Lymphocyte % 2.0 (L) 19.6 - 45.3 %    Monocyte % 4.0 (L) 5.0 - 12.0 %    Neutrophils Absolute 27.46 (H) 1.70 - 7.00 10*3/mm3    Lymphocytes Absolute 0.58 (L) 0.70 - 3.10 10*3/mm3    Monocytes Absolute 1.17 (H) 0.10 - 0.90 10*3/mm3    Poikilocytes Mod/2+ None Seen    WBC Morphology Normal Normal    Platelet Morphology Normal Normal   Urinalysis With Microscopic If Indicated (No Culture) - Indwelling Urethral Catheter    Collection Time: 07/17/23 10:38 AM    Specimen: Indwelling Urethral Catheter; Urine   Result Value Ref Range    Color, UA Red (A) Yellow, Straw    Appearance, UA Cloudy (A) Clear    pH, UA 7.5 5.0 - 8.0    Specific Gravity, UA 1.020 1.005 - 1.030    Glucose, UA Negative Negative    Ketones, UA Trace (A) Negative    Bilirubin, UA Moderate (2+) (A) Negative    Blood, UA Large (3+) (A) Negative    Protein, UA >=300 mg/dL (3+) (A) Negative    Leuk Esterase, UA Large (3+) (A) Negative    Nitrite, UA Positive (A) Negative    Urobilinogen, UA 1.0 E.U./dL 0.2 - 1.0 E.U./dL   Urinalysis, Microscopic Only - Indwelling Urethral Catheter    Collection Time: 07/17/23 10:38 AM    Specimen: Indwelling Urethral Catheter; Urine   Result Value Ref Range    RBC, UA Too Numerous to Count (A) None Seen, 0-2 /HPF    WBC, UA Unable to determine due to loaded field (A) None Seen, 0-2 /HPF    Bacteria, UA Unable to determine due to loaded field (A) None Seen /HPF    Squamous Epithelial Cells, UA Unable to determine due to loaded field (A) None Seen, 0-2 /HPF    Hyaline Casts, UA Unable to determine due to loaded field None Seen /LPF    Methodology Automated Microscopy    Lactic Acid, Plasma    Collection Time: 07/17/23 11:51 AM    Specimen: Blood   Result Value Ref Range    Lactate 2.4 (C) 0.5 - 2.0 mmol/L   Blood Culture - Blood, Arm, Left    Collection Time: 07/17/23 12:43 PM    Specimen: Arm, Left; Blood   Result Value Ref Range    Blood Culture Abnormal Stain (C)     Gram Stain  Aerobic Bottle Gram negative bacilli (C)    Blood Culture ID, PCR - Blood, Arm, Left    Collection Time: 07/17/23 12:43 PM    Specimen: Arm, Left; Blood   Result Value Ref Range    BCID, PCR Enterobacterales spp. Identification by BCID2 PCR. (A) Negative by BCID PCR. Culture to Follow.    BOTTLE TYPE Aerobic Bottle    STAT Lactic Acid, Reflex    Collection Time: 07/17/23  5:27 PM    Specimen: Blood   Result Value Ref Range    Lactate 2.5 (C) 0.5 - 2.0 mmol/L   STAT Lactic Acid, Reflex    Collection Time: 07/18/23 12:26 AM    Specimen: Blood   Result Value Ref Range    Lactate 1.7 0.5 - 2.0 mmol/L   Hemoglobin & Hematocrit, Blood    Collection Time: 07/18/23 12:26 AM    Specimen: Blood   Result Value Ref Range    Hemoglobin 13.4 13.0 - 17.7 g/dL    Hematocrit 38.1 37.5 - 51.0 %   Sodium, Urine, Random - Urine, Clean Catch    Collection Time: 07/18/23 12:45 AM    Specimen: Urine, Clean Catch   Result Value Ref Range    Sodium, Urine 36 mmol/L   Myoglobin Screen, Urine - Urine, Clean Catch    Collection Time: 07/18/23 12:45 AM    Specimen: Urine, Clean Catch   Result Value Ref Range    Myoglobin, Qualitative Positive (A) Negative   Creatinine Urine Random (kidney function) GFR component - Urine, Clean Catch    Collection Time: 07/18/23 12:45 AM    Specimen: Urine, Clean Catch   Result Value Ref Range    Creatinine, Urine 96.3 mg/dL   Chloride, Urine, Random - Urine, Clean Catch    Collection Time: 07/18/23 12:45 AM    Specimen: Urine, Clean Catch   Result Value Ref Range    Chloride, Urine 30 mmol/L   CBC (No Diff)    Collection Time: 07/18/23  6:19 AM    Specimen: Blood   Result Value Ref Range    WBC 18.42 (H) 3.40 - 10.80 10*3/mm3    RBC 4.26 4.14 - 5.80 10*6/mm3    Hemoglobin 13.1 13.0 - 17.7 g/dL    Hematocrit 37.9 37.5 - 51.0 %    MCV 89.0 79.0 - 97.0 fL    MCH 30.8 26.6 - 33.0 pg    MCHC 34.6 31.5 - 35.7 g/dL    RDW 14.2 12.3 - 15.4 %    RDW-SD 45.2 37.0 - 54.0 fl    MPV 12.2 (H) 6.0 - 12.0 fL    Platelets 45  (C) 140 - 450 10*3/mm3   CK    Collection Time: 07/18/23  6:19 AM    Specimen: Blood   Result Value Ref Range    Creatine Kinase 3,059 (H) 20 - 200 U/L   Uric Acid    Collection Time: 07/18/23  6:19 AM    Specimen: Blood   Result Value Ref Range    Uric Acid 9.8 (H) 3.4 - 7.0 mg/dL   Renal Function Panel    Collection Time: 07/18/23  6:19 AM    Specimen: Blood   Result Value Ref Range    Glucose 71 65 - 99 mg/dL    BUN 91 (H) 8 - 23 mg/dL    Creatinine 4.51 (H) 0.76 - 1.27 mg/dL    Sodium 141 136 - 145 mmol/L    Potassium 4.6 3.5 - 5.2 mmol/L    Chloride 111 (H) 98 - 107 mmol/L    CO2 18.0 (L) 22.0 - 29.0 mmol/L    Calcium 8.2 (L) 8.6 - 10.5 mg/dL    Albumin 2.4 (L) 3.5 - 5.2 g/dL    Phosphorus 3.7 2.5 - 4.5 mg/dL    Anion Gap 12.0 5.0 - 15.0 mmol/L    BUN/Creatinine Ratio 20.2 7.0 - 25.0    eGFR 13.1 (L) >60.0 mL/min/1.73       Radiology:  Imaging Results (Last 24 Hours)       Procedure Component Value Units Date/Time    CT Head Without Contrast [[id number]] Collected: 07/17/23 1109     Updated: 07/17/23 1628    Narrative:      CT HEAD WITHOUT CONTRAST     HISTORY: Found on floor.     COMPARISON: None.     FINDINGS: Patient's head is canted in the scanner. There is  age-appropriate atrophy. Mild-to-moderate vascular calcifications  involving the carotid siphons are noted. There is no evidence of  hemorrhage, hydrocephalus or of a focal area of decreased attenuation to  suggest acute infarction. Further evaluation could be performed with MRI  examination of brain as indicated.           Radiation dose reduction techniques were utilized, including automated  exposure control and exposure modulation based on body size.     This report was finalized on 7/17/2023 4:25 PM by Dr. Mamadou Aguila M.D.       CT Abdomen Pelvis Without Contrast [[id number]] Collected: 07/17/23 1329     Updated: 07/17/23 1336    Narrative:      EXAMINATION: CT OF THE ABDOMEN AND PELVIS WITHOUT CONTRAST     TECHNIQUE: Computed tomography of the  abdomen and pelvis without  intravenous contrast per protocol. Radiation dose reduction techniques  were utilized, including automated exposure control and exposure  modulation based on body size.      HISTORY: Hematuria     COMPARISON: None available     FINDINGS: Limited evaluation of the inferior thorax demonstrates  atelectasis, without consolidation, pleural effusion, or pneumothorax.  The heart is normal in size, without pericardial effusion. Enlarged  lymph nodes are seen near the distal esophagus, measuring up to 1.2 cm  in short axis diameter on series 2, image 6.     The examination is limited by patient motion. Likely parapelvic cysts  are seen in the left kidney. The right kidney is atrophic. There is  urinary bladder distention and trabeculation. Mild right-sided  hydronephrosis is seen. No left-sided hydronephrosis is demonstrated.  There is layering material in the urinary bladder. The urinary bladder  is markedly distended.     The liver, spleen, adrenal glands, pancreas, stomach, small bowel, large  bowel, and abdominal vasculature are normal as evaluated on this  noncontrast examination. There are right greater than left  fat-containing inguinal hernias. No intraperitoneal fluid collection or  free gas are seen. No enlarged lymph nodes are demonstrated in the  abdomen or pelvis.     Bone windows demonstrate degenerative changes, without suspicious  osseous lesion seen.       Impression:      1. Lower thoracic lymphadenopathy. Correlate with history and physical  exam  2. Limited examination of the kidneys demonstrate parapelvic cyst on the  left and renal atrophy with mild hydronephrosis on the right  3. Layering material in the urinary bladder. Correlate with urinalysis  4. Thick-walled, trabeculated urinary bladder, suggestive of outlet  obstruction  5. Urinary bladder distention. Consider catheterization  6. Additional incidental findings as above.     This report was finalized on 7/17/2023 1:33  PM by Dr. Eran Jansen M.D.                Assessment:  Severe renal failure secondary to prerenal causes, rhabdo  UTI with sepsis, acute cystitis  Diarrhea, rule out C. difficile  Hyperkalemia, better with medical treatment  Metabolic acidosis/lactic acidosis  Rhabdomyolysis  Hypotension  Urinary retention with mild right hydronephrosis, now status post White catheter placement  Thrombocytopenia, worse, possibly from sepsis  Leukocytosis  Elevated LFTs, possibly muscle source related to rhabdomyolysis  Hyperuricemia from volume depletion      Discussion/plan:  Renal function does appear to be improving with following waste products  Potassium is normalized with medical treatment  Fortunately no need for hemodialysis at this time  Continue current IV fluids containing IV bicarb and antibiotics per sepsis protocol  Recheck labs in a.m.  Continue to trend CK levels which are trending down  We will change IV fluids to contain IV bicarb to help alkalinize urine  Check renal ultrasound and add urine electrolytes to urine in lab  Limited evaluation of the kidneys on the CT and there was some mild hydronephrosis.  We will check dedicated renal ultrasound today after White placement  Contain White for now  We will follow closely    Continue to monitor electrolytes and volume closely, avoid IV contrast and nephrotoxic medications   Patient remains very ill but fortunately not as critical as upon presentation in ER.  We will continue very close monitoring, 35 minutes critical care time spent    I appreciate the consult request  Please send me a secure chat message if any questions regarding patient care.      Brian Farrar M.D  [facility]  Office phone number: [phone]  Answering service phone number: [phone]      7/18/2023        Dictation via Dragon dictation software

## 2023-07-18 NOTE — THERAPY EVALUATION
Acute Care - Speech Language Pathology   Swallow Initial Evaluation [facility]     Patient Name: Pedro Peck  : 1950  MRN: [id number]  Today's Date: 2023               Admit Date: 2023    Visit Dx:     ICD-10-CM ICD-9-CM   1. Traumatic rhabdomyolysis, initial encounter  T79.6XXA 958.6   2. DERECK (acute kidney injury)  N17.9 584.9     Patient Active Problem List   Diagnosis    DERECK (acute kidney injury)    Severe malnutrition    Vitamin D deficiency    Hypophosphatemia    Posterior vitreous detachment    Age-related nuclear cataract of both eyes    Rhabdomyolysis    Sepsis secondary to UTI    Acute cystitis without hematuria    Diarrhea     Past Medical History:   Diagnosis Date    Autism     Falls      Past Surgical History:   Procedure Laterality Date    PROSTATECTOMY         SLP Recommendation and Plan  SLP Swallowing Diagnosis: oral dysphagia, suspected pharyngeal dysphagia (23)  SLP Diet Recommendation: soft to chew textures, chopped, nectar thick liquids, ice chips between meals after oral care, with supervision, water between meals after oral care, with supervision (23)  Recommended Precautions and Strategies: upright posture during/after eating, small bites of food and sips of liquid, no straw, general aspiration precautions, fatigue precautions (23)  SLP Rec. for Method of Medication Administration: meds whole, meds crushed, with thick liquids, with puree, as tolerated (23)     Monitor for Signs of Aspiration: yes, notify SLP if any concerns (23)  Recommended Diagnostics: reassess via clinical swallow evaluation, other (see comments) (monitor need for instrumental swallow evaluation) (23)  Swallow Criteria for Skilled Therapeutic Interventions Met: demonstrates skilled criteria (23)  Anticipated Discharge Disposition (SLP): anticipate therapy at next level of care (23)  Rehab Potential/Prognosis,  Swallowing: adequate, monitor progress closely (07/18/23 1100)  Therapy Frequency (Swallow): PRN (07/18/23 1100)  Predicted Duration Therapy Intervention (Days): until discharge (07/18/23 1100)  Oral Care Recommendations: Oral Care BID/PRN, Before ice/water (07/18/23 1100)     Plan of Care Reviewed With: patient  Outcome Evaluation: Clinical swallow evaluation completed. Suspect oropharyngeal dysphagia impacted by generalized weakness. Patient demonstrated immediate coughing with straw sips of thin liquids, delayed coughing with cup sips. Weak cough. No overt s/s of aspiration with nectar thick liquids via cup sips, puree, or mechanical soft. Patient with prolonged mastication with mechanical soft (whole) trials. Concern for fatigue. Deferred regular trials. Recommend a mechanical soft diet with chopped meats and nectar thick liquids. Meds as tolerated with puree or nectar thick liquids. Ice chips or small sips of water 30 minutes after meals following oral care. Safe swallow strategies: upright for PO intake, small bites/sips, no straws, fatigue precautions, aspiration precautions. SLP to follow for diet tolerance and monitor readiness for re-evaluation versus instrumental swallow evaluation.      SWALLOW EVALUATION (last 72 hours)       SLP Adult Swallow Evaluation       Row Name 07/18/23 1100                   Rehab Evaluation    Document Type evaluation  -HS        Subjective Information no complaints  -HS        Patient Observations alert;cooperative  -HS        Patient/Family/Caregiver Comments/Observations No family present during evaluation.  -HS        Patient Effort good  -HS        Symptoms Noted During/After Treatment none  -HS           General Information    Patient Profile Reviewed yes  -HS        Pertinent History Of Current Problem Found down,renal failure, sepsis and suspected urinary tract infection, rhabdo. Hx of autism.  -HS        Current Method of Nutrition NPO  until SLP eval  -HS         Prior Level of Function-Swallowing no diet consistency restrictions;safe, efficient swallowing in all situations;esophageal concerns;other (see comments)  reports hx of esophageal narrowing with dilation  -        Plans/Goals Discussed with patient  -HS        Barriers to Rehab none identified  -        Patient's Goals for Discharge return to PO diet  -HS           Oral Motor Structure and Function    Dentition Assessment missing teeth  -HS        Secretion Management WNL/WFL  -HS        Mucosal Quality dry  -HS        Volitional Cough weak;non-productive  -HS           Oral Musculature and Cranial Nerve Assessment    Oral Motor General Assessment generalized oral motor weakness  -HS           General Eating/Swallowing Observations    Eating/Swallowing Skills self-fed;fed by SLP;appropriate self-feeding skills observed  -HS        Positioning During Eating upright in bed  -HS        Utensils Used spoon;cup;straw  -HS        Consistencies Trialed thin liquids;nectar/syrup-thick liquids;pureed;soft to chew textures;whole  -HS           Respiratory    Respiratory Status WFL  -HS           Clinical Swallow Eval    Clinical Swallow Evaluation Summary Clinical swallow evaluation completed. Suspect oropharyngeal dysphagia impacted by generalized weakness. Patient demonstrated immediate coughing with straw sips of thin liquids, delayed coughing with cup sips. Weak cough. No overt s/s of aspiration with nectar thick liquids via cup sips, puree, or mechanical soft. Patient with prolonged mastication with mechanical soft (whole) trials. Concern for fatigue. Deferred regular trials.  -HS           SLP Evaluation Clinical Impression    SLP Swallowing Diagnosis oral dysphagia;suspected pharyngeal dysphagia  -HS        Functional Impact risk of aspiration/pneumonia;risk of malnutrition;risk of dehydration  -HS        Rehab Potential/Prognosis, Swallowing adequate, monitor progress closely  -        Swallow Criteria for  Skilled Therapeutic Interventions Met demonstrates skilled criteria  -           Recommendations    Therapy Frequency (Swallow) PRN  -HS        Predicted Duration Therapy Intervention (Days) until discharge  -        SLP Diet Recommendation soft to chew textures;chopped;nectar thick liquids;ice chips between meals after oral care, with supervision;water between meals after oral care, with supervision  -HS        Recommended Diagnostics reassess via clinical swallow evaluation;other (see comments)  monitor need for instrumental swallow evaluation  -HS        Recommended Precautions and Strategies upright posture during/after eating;small bites of food and sips of liquid;no straw;general aspiration precautions;fatigue precautions  -HS        Oral Care Recommendations Oral Care BID/PRN;Before ice/water  -HS        SLP Rec. for Method of Medication Administration meds whole;meds crushed;with thick liquids;with puree;as tolerated  -        Monitor for Signs of Aspiration yes;notify SLP if any concerns  -HS        Anticipated Discharge Disposition (SLP) anticipate therapy at next level of care  -HS           Swallow Goals (SLP)    Swallow LTGs Swallow Long Term Goal (free text)  -HS        Swallow STGs diet tolerance goal selection (SLP)  -        Diet Tolerance Goal Selection (SLP) Patient will tolerate trials of  -HS           (LTG) Swallow    (LTG) Swallow Safe and efficient swallow with least restrictive diet.  -HS        [place] (Swallow Long Term Goal) with minimal cues (75-90% accuracy)  -        Time Frame (Swallow Long Term Goal) by discharge  -HS           (STG) Patient will tolerate trials of    Consistencies Trialed (Tolerate trials) soft to chew (chopped) textures;nectar/ mildly thick liquids  ice chips, small sips of water  -        Desired Outcome (Tolerate trials) without signs/symptoms of aspiration  -HS        [place] (Tolerate trials) with minimal cues (75-90% accuracy)  -         Time Frame (Tolerate trials) 1 week  -HS                  User Key  (r) = Recorded By, (t) = Taken By, (c) = Cosigned By      Initials Name Effective Dates    HS Kathy Benton, MS CCC-SLP 06/08/18 -                     EDUCATION  The patient has been educated in the following areas:   Dysphagia (Swallowing Impairment) Oral Care/Hydration.        SLP GOALS       Row Name 07/18/23 1100             (LTG) Swallow    (LTG) Swallow Safe and efficient swallow with least restrictive diet.  -HS      [place] (Swallow Long Term Goal) with minimal cues (75-90% accuracy)  -HS      Time Frame (Swallow Long Term Goal) by discharge  -HS         (STG) Patient will tolerate trials of    Consistencies Trialed (Tolerate trials) soft to chew (chopped) textures;nectar/ mildly thick liquids  ice chips, small sips of water  -HS      Desired Outcome (Tolerate trials) without signs/symptoms of aspiration  -HS      [place] (Tolerate trials) with minimal cues (75-90% accuracy)  -HS      Time Frame (Tolerate trials) 1 week  -HS                User Key  (r) = Recorded By, (t) = Taken By, (c) = Cosigned By      Initials Name Provider Type     Kathy Benton MS CCC-SLP Speech and Language Pathologist                       Time Calculation:    Time Calculation- SLP       Row Name 07/18/23 1152             Time Calculation- SLP    SLP Start Time 1030  -HS      SLP Received On 07/18/23  -HS         Untimed Charges    SLP Eval/Re-eval  ST Eval Oral Pharyng Swallow - [id number]  -HS      [id number] Eval Oral Pharyng Swallow Minutes 60  -HS         Total Minutes    Untimed Charges Total Minutes 60  -HS       Total Minutes 60  -HS                User Key  (r) = Recorded By, (t) = Taken By, (c) = Cosigned By      Initials Name Provider Type    Kathy Valdez MS CCC-SLP Speech and Language Pathologist                             Kathy Benton MS CCC-STU  7/18/2023

## 2023-07-18 NOTE — PROGRESS NOTES
Name: Pedro Peck ADMIT: 2023   : 1950  PCP: Brandon Rosen APRN    MRN: [id number] LOS: 1 days   AGE/SEX: 73 y.o. male  ROOM: [facility]   Subjective   Chief Complaint   Patient presents with    Fall   73-year-old with history of recent prostate surgery with indwelling Edge catheter who presents after being found down by himself for a number of days and found to have significant renal failure, sepsis and suspected urinary tract infection, rhabdo.     On IVFs  On IV ABX  Seen by [facility]  No f/c  No n/v  No cp/palp  No soa/cough    Objective   Vital Signs  Temp:  [97.5 °F (36.4 °C)-97.9 °F (36.6 °C)] 97.9 °F (36.6 °C)  Heart Rate:  [] 73  Resp:  [16-18] 16  BP: ()/(63-83) 95/63  SpO2:  [95 %-96 %] 96 %  on   ;   Device (Oxygen Therapy): room air  Body mass index is 20.81 kg/m².    Physical Exam  Constitutional:       General: He is in acute distress.      Appearance: He is ill-appearing.   HENT:      Head: Normocephalic and atraumatic.   Eyes:      General: No scleral icterus.  Cardiovascular:      Rate and Rhythm: Normal rate and regular rhythm.      Heart sounds: Normal heart sounds.   Pulmonary:      Effort: Pulmonary effort is normal. No respiratory distress.      Breath sounds: Normal breath sounds.   Abdominal:      General: There is no distension.      Palpations: Abdomen is soft.      Tenderness: There is no abdominal tenderness.   Musculoskeletal:      Cervical back: Neck supple.   Skin:     Coloration: Skin is pale.   Neurological:      Mental Status: He is alert.   +edge with dark urine, but not blood     Results Review:       I reviewed the patient's new clinical results.  Results from last 7 days   Lab Units 23  0619 23  0026 23  1034   WBC 10*3/mm3 18.42*  --  29.24*   HEMOGLOBIN g/dL 13.1 13.4 14.2   PLATELETS 10*3/mm3 45*  --  53*     Results from last 7 days   Lab Units 23  0619 23  1034   SODIUM mmol/L 141 141   POTASSIUM mmol/L 4.6  5.5*   CHLORIDE mmol/L 111* 105   CO2 mmol/L 18.0* 18.0*   BUN mg/dL 91* 89*   CREATININE mg/dL 4.51* 6.58*   GLUCOSE mg/dL 71 69   Estimated Creatinine Clearance: 13.8 mL/min (A) (by C-G formula based on SCr of 4.51 mg/dL (H)).  Results from last 7 days   Lab Units 07/18/23  0619 07/17/23  1034   ALBUMIN g/dL 2.4* 3.1*   BILIRUBIN mg/dL  --  1.6*   ALK PHOS U/L  --  137*   AST (SGOT) U/L  --  430*   ALT (SGPT) U/L  --  154*     Results from last 7 days   Lab Units 07/18/23  0619 07/17/23  1034   CALCIUM mg/dL 8.2* 8.9   ALBUMIN g/dL 2.4* 3.1*   PHOSPHORUS mg/dL 3.7  --      Results from last 7 days   Lab Units 07/18/23  0026 07/17/23  1727 07/17/23  1151   LACTATE mmol/L 1.7 2.5* 2.4*       Coag     HbA1C   Lab Results   Component Value Date    HGBA1C 5.69 (H) 11/10/2021     Infection   Results from last 7 days   Lab Units 07/17/23  1243 07/17/23  1151   BLOODCX  Abnormal Stain* No growth at 24 hours   BCIDPCR  Enterobacterales spp. Identification by BCID2 PCR.*  --      Radiology(recent) CT Abdomen Pelvis Without Contrast    Result Date: 7/17/2023  1. Lower thoracic lymphadenopathy. Correlate with history and physical exam 2. Limited examination of the kidneys demonstrate parapelvic cyst on the left and renal atrophy with mild hydronephrosis on the right 3. Layering material in the urinary bladder. Correlate with urinalysis 4. Thick-walled, trabeculated urinary bladder, suggestive of outlet obstruction 5. Urinary bladder distention. Consider catheterization 6. Additional incidental findings as above.  This report was finalized on 7/17/2023 1:33 PM by Dr. Eran Jansen M.D.     No results found for: TROPONINT, TROPONINI, BNP  No components found for: TSH;2    [START ON 7/19/2023] cefepime, 2,000 mg, Intravenous, Q24H  senna-docusate sodium, 2 tablet, Oral, BID  sodium chloride, 10 mL, Intravenous, Q12H      custom IV infusion builder, , Last Rate: 150 mL/hr at 07/18/23 1032    Diet: Regular/House Diet; No Straw, No  Mixed Consistencies; Texture: Soft to Chew (NDD 3); Soft to Chew: Chopped Meat; Fluid Consistency: Nectar Thick      Assessment & Plan      Active Hospital Problems    Diagnosis  POA    **DERECK (acute kidney injury) [N17.9]  Yes    Sepsis due to Gram negative bacteria [A41.50]  Unknown    Bacteremia [R78.81]  Unknown    Thrombocytopenia [D69.6]  Unknown    Rhabdomyolysis [M62.82]  Unknown    Sepsis secondary to UTI [A41.9, N39.0]  Unknown    Acute cystitis without hematuria [N30.00]  Unknown    Diarrhea [R19.7]  Unknown      Resolved Hospital Problems   No resolved problems to display.   73-year-old with history of recent prostate surgery with indwelling Edge catheter who presents after being found down by himself for a number of days and found to have significant renal failure, sepsis and suspected urinary tract infection, rhabdo.     We will admit him to the hospital given him aggressive IV fluids, IV antibiotics.  Monitor renal function closely and follow-up cultures. Change to IV cefepime with bactremia  Monitor daily CK.     Discussion with patient's sister and niece (on 7/17) he has had a significant mental and physical decline over the past few months to year.  He will likely need subacute rehab after stabilization.  He likely will have continued decline in the future.  Also discussed with them that he is significantly ill and this is a life-threatening condition.    Thanks to nephrology who is following. No hematuria so no indication for Urology appt at this time- chronic edge (exchanged here) and follows with [facility] who does not come to this hospital.     Thrombocytopenia likely related to sepsis/infection, monitor. Avoid blood thinners at this time.     STEVE Nelson MD  [facility]  07/18/23  12:50 EDT

## 2023-07-18 NOTE — DISCHARGE PLACEMENT REQUEST
"Caprice Peck (73 y.o. Male)       Date of Birth   1950    Social Security Number       Address   [address] [place] [address]    Home Phone   [phone]    MRN   [id number]       Mandaeism   Gnosticism    Marital Status   Single                            Admission Date   7/17/23    Admission Type   Emergency    Admitting Provider   Travis Nelson MD    Attending Provider   Travis Nelson MD    Department, Room/Bed   [address], [address]/1       Discharge Date       Discharge Disposition       Discharge Destination                                 Attending Provider: Travis Nelson MD    Allergies: No Known Allergies    Isolation: Spore   Infection: C.difficile (rule out) (07/17/23)   Code Status: CPR    Ht: 177.8 cm (70\")   Wt: 65.8 kg (145 lb)    Admission Cmt: None   Principal Problem: DERECK (acute kidney injury) [N17.9]                   Active Insurance as of 7/17/2023       Primary Coverage       Payor Plan Insurance Group Employer/Plan Group    MEDICARE MEDICARE A & B        Payor Plan Address Payor Plan Phone Number Payor Plan Fax Number Effective Dates    [address] [phone]  4/1/2016 - None Entered    [facility] [id number]         Subscriber Name Subscriber Birth Date Member ID       CAPRICE PECK 1950 [id number]               Secondary Coverage       Payor Plan Insurance Group Employer/Plan Group    [facility] SUPP [id number]       Payor Plan Address Payor Plan Phone Number Payor Plan Fax Number Effective Dates    [address]   12/1/2016 - None Entered    [facility] [id number]         Subscriber Name Subscriber Birth Date Member ID       CAPRICE PECK 1950 [id number]                     Emergency Contacts        (Rel.) Home Phone Work Phone Mobile Phone    ANDRE SAMAYOA (Sister) [phone] -- [phone]    Phillip Peck (Brother) [phone] -- --                "

## 2023-07-18 NOTE — PLAN OF CARE
Goal Outcome Evaluation:  Plan of Care Reviewed With: patient        Progress: no change  Outcome Evaluation: Pt is a 74 yo male admitted following being found down by family for a number of days, found to have significant renal failure, sepsis and suspected urinary tract infection, rhabdo. PMHx includes autism. Pt seen for OT eval this date, A&Ox2, confusion noted throughout session, unable to orient to situation. Pt following most commands however. Per chart, pt lives alone and family checks in. Per chart, family has noted a mental and physical decline recently. Pt presents with decreased safety, cognition, impaired act tolerance, strength, balance and overall decreased (I) with ADLs and func transfers. He completed bed mon with min A x1, min A x2 for STS progressing to min A x1 for func mob with RWx, Pt able to demo figure 4 with LBD task, edge in place. UIC end of session with all needs met, he will continue to benefit from skilled OT to address noted functional deficits and progress toward prior level of (I). Rec SNF as pt is not safe to return home at this time.      Anticipated Discharge Disposition (OT): skilled nursing facility

## 2023-07-18 NOTE — NURSING NOTE
2015 - pt complaining of urge to void. F/c bag with little output. Abdomen distended. Bladder scan revealed >999.  HR jumped to the 120s.     2025 - irrigated f/c, dark blood mixed with clots removed. Total urinary output around 1600ml/hr, pt felt relief.  HR slowly decreasing to less than 100.     2045 - pt had small bowel movement, cleaned f/c and changed pt.     2115 - paged placed to [facility] regarding output of blood. Urology not consulted at this time.     2135 - received page back from [facility], orders for strict I/Os.  H/H ordered.

## 2023-07-18 NOTE — THERAPY EVALUATION
Patient Name: Pedro Peck  : 1950    MRN: [id number]                              Today's Date: 2023       Admit Date: 2023    Visit Dx:     ICD-10-CM ICD-9-CM   1. Traumatic rhabdomyolysis, initial encounter  T79.6XXA 958.6   2. DERECK (acute kidney injury)  N17.9 584.9     Patient Active Problem List   Diagnosis    DERECK (acute kidney injury)    Severe malnutrition    Vitamin D deficiency    Hypophosphatemia    Posterior vitreous detachment    Age-related nuclear cataract of both eyes    Rhabdomyolysis    Sepsis secondary to UTI    Acute cystitis without hematuria    Diarrhea    Sepsis due to Gram negative bacteria    Bacteremia    Thrombocytopenia     Past Medical History:   Diagnosis Date    Autism     Falls      Past Surgical History:   Procedure Laterality Date    PROSTATECTOMY        General Information       Row Name 23 1526          OT Time and Intention    Document Type evaluation  -MW     Mode of Treatment co-treatment;occupational therapy;physical therapy  -       Row Name 23 1526          General Information    Patient Profile Reviewed yes  -MW     Prior Level of Function independent:;ADL's;transfer  owns Rwx per pt report  -MW     Existing Precautions/Restrictions fall  -MW     Barriers to Rehab medically complex;cognitive status;previous functional deficit  hx autism  -MW       Row Name 23 1526          Living Environment    People in Home alone  -MW       Row Name 23 1526          Home Main Entrance    Number of Stairs, Main Entrance --  unknown  -MW       Row Name 23 1526          Cognition    Orientation Status (Cognition) oriented to;person;place;disoriented to;situation;verbal cues/prompts needed for orientation  -MW       Row Name 23 1526          Safety Issues, Functional Mobility    Impairments Affecting Function (Mobility) balance;cognition;endurance/activity tolerance;strength  -MW     Cognitive Impairments, Mobility Safety/Performance  attention;safety precaution awareness;sequencing abilities;safety precaution follow-through;insight into deficits/self-awareness;judgment  -               User Key  (r) = Recorded By, (t) = Taken By, (c) = Cosigned By      Initials Name Provider Type    Violet Waldron OT Occupational Therapist                     Mobility/ADL's       Row Name 07/18/23 1527          Bed Mobility    Supine-Sit [place] (Bed Mobility) verbal cues;minimum assist (75% patient effort);1 person assist  -     Sit-Supine [place] (Bed Mobility) not tested  -     Comment, (Bed Mobility) UIC end of session, trunk assist for coming to EOB  -       Row Name 07/18/23 1527          Transfers    Transfers sit-stand transfer  -       Row Name 07/18/23 152          Sit-Stand Transfer    Sit-Stand [place] (Transfers) minimum assist (75% patient effort);2 person assist;verbal cues  -     Assistive Device (Sit-Stand Transfers) walker, front-wheeled  -     Comment, (Sit-Stand Transfer) STS from EOB x2 this date  -MW       Row Name 07/18/23 [facility]          Functional Mobility    Functional Mobility- Ind. Level minimum assist (75% patient effort)  -     Functional Mobility- Device walker, front-wheeled  -     Functional Mobility- Comment min A x1 for func mob, to doorway and returned to other side of room in chair  -[facility] Name 07/18/23 1527          Activities of Daily Living    BADL Assessment/Intervention lower body dressing;feeding;toileting  -[facility] Name 07/18/23 1527          Lower Body Dressing Assessment/Training    [place] Level (Lower Body Dressing) don;socks  -     Comment, (Lower Body Dressing) adjusting socks at EOB with figure 4, CGA for sit balance  -[facility] Name 07/18/23 152          Self-Feeding Assessment/Training    Comment, (Feeding) RUE hand to mouth with load spoon of pudding, s/up required  -[facility] Name 07/18/23 [facility]          Toileting Assessment/Training    Comment,  (Toileting) indwelling edge present, brief donned  -               User Key  (r) = Recorded By, (t) = Taken By, (c) = Cosigned By      Initials Name Provider Type    Violet Waldron OT Occupational Therapist                   Obj/Interventions       Row Name 07/18/23 1531          Sensory Assessment (Somatosensory)    Sensory Assessment (Somatosensory) UE sensation intact  -       Row Name 07/18/23 1531          Vision Assessment/Intervention    Visual Impairment/Limitations WFL  -     Vision Assessment Comment however pt having difficulty navigating obstacles within room  -       Row Name 07/18/23 1531          Range of Motion Comprehensive    General Range of Motion bilateral upper extremity ROM WNL  -       Row Name 07/18/23 1531          Strength Comprehensive (MMT)    General Manual Muscle Testing (MMT) Assessment upper extremity strength deficits identified  -     Comment, General Manual Muscle Testing (MMT) Assessment generalized weakness, BUE grossly 3+/5  -       Row Name 07/18/23 1531          Motor Skills    Motor Skills functional endurance;coordination  -     Coordination left;upper extremity;fine motor deficit;minimal impairment  -     Functional Endurance fair, on RA  -       Row Name 07/18/23 1531          Balance    Balance Assessment sitting static balance;sitting dynamic balance;sit to stand dynamic balance;standing static balance;standing dynamic balance  -     Static Sitting Balance standby assist  -     Dynamic Sitting Balance contact guard  -     Position, Sitting Balance sitting edge of bed  -     Sit to Stand Dynamic Balance minimal assist;2-person assist  -     Static Standing Balance contact guard;verbal cues  -     Dynamic Standing Balance minimal assist;verbal cues  -     Position/Device Used, Standing Balance supported;walker, front-wheeled  -     Balance Interventions sitting;standing;sit to stand;supported;static;dynamic;minimal challenge   -MW     Comment, Balance minimally unsteady  -MW               User Key  (r) = Recorded By, (t) = Taken By, (c) = Cosigned By      Initials Name Provider Type    Violet Waldron, CASEY Occupational Therapist                   Goals/Plan       Row Name 07/18/23 1537          Transfer Goal 1 (OT)    Activity/Assistive Device (Transfer Goal 1, OT) transfers, all  -MW     [place] Level/Cues Needed (Transfer Goal 1, OT) standby assist  -MW     Time Frame (Transfer Goal 1, OT) short term goal (STG);2 weeks  -MW     Progress/Outcome (Transfer Goal 1, OT) goal ongoing  -[facility] Name 07/18/23 1537          Dressing Goal 1 (OT)    Activity/Device (Dressing Goal 1, OT) dressing skills, all  -MW     [place]/Cues Needed (Dressing Goal 1, OT) contact guard required  -MW     Time Frame (Dressing Goal 1, OT) short term goal (STG);2 weeks  -MW     Progress/Outcome (Dressing Goal 1, OT) goal ongoing  -[facility] Name 07/18/23 1537          Toileting Goal 1 (OT)    Activity/Device (Toileting Goal 1, OT) toileting skills, all  -MW     [place] Level/Cues Needed (Toileting Goal 1, OT) contact guard required  -MW     Time Frame (Toileting Goal 1, OT) short term goal (STG);2 weeks  -MW     Progress/Outcome (Toileting Goal 1, OT) goal ongoing  -MW       Row Name 07/18/23 1537          Grooming Goal 1 (OT)    Activity/Device (Grooming Goal 1, OT) grooming skills, all  -MW     [place] (Grooming Goal 1, OT) standby assist  -MW     Time Frame (Grooming Goal 1, OT) short term goal (STG);2 weeks  -MW       Row Name 07/18/23 1537          Strength Goal 1 (OT)    Strength Goal 1 (OT) improve to 4/5 UE strength  -MW     Time Frame (Strength Goal 1, OT) short term goal (STG);2 weeks  -MW     Progress/Outcome (Strength Goal 1, OT) goal ongoing  -MW       Row Name 07/18/23 1537          Therapy Assessment/Plan (OT)    Planned Therapy Interventions (OT) activity tolerance training;functional balance retraining;BADL  retraining;patient/caregiver education/training;neuromuscular control/coordination retraining;transfer/mobility retraining;strengthening exercise;ROM/therapeutic exercise;occupation/activity based interventions  -MW               User Key  (r) = Recorded By, (t) = Taken By, (c) = Cosigned By      Initials Name Provider Type    Violet Waldron, CASEY Occupational Therapist                   Clinical Impression       Row Name 07/18/23 1532          Pain Assessment    Pretreatment Pain Rating 0/10 - no pain  -MW     Posttreatment Pain Rating 0/10 - no pain  -MW       Row Name 07/18/23 1532          Plan of Care Review    Plan of Care Reviewed With patient  -MW     Progress no change  -MW     Outcome Evaluation Pt is a 72 yo male admitted following being found down by family for a number of days, found to have significant renal failure, sepsis and suspected urinary tract infection, rhabdo. PMHx includes autism. Pt seen for OT eval this date, A&Ox2, confusion noted throughout session, unable to orient to situation. Pt following most commands however. Per chart, pt lives alone and family checks in. Per chart, family has noted a mental and physical decline recently. Pt presents with decreased safety, cognition, impaired act tolerance, strength, balance and overall decreased (I) with ADLs and func transfers. He completed bed mon with min A x1, min A x2 for STS progressing to min A x1 for func mob with RWx, Pt able to demo figure 4 with LBD task, edge in place. UIC end of session with all needs met, he will continue to benefit from skilled OT to address noted functional deficits and progress toward prior level of (I). Rec SNF as pt is not safe to return home at this time.  -       Row Name 07/18/23 1532          Therapy Assessment/Plan (OT)    Rehab Potential (OT) good, to achieve stated therapy goals  -MW     Criteria for Skilled Therapeutic Interventions Met (OT) yes;meets criteria;skilled treatment is necessary  -MW      Therapy Frequency (OT) 5 times/wk  -MW       Row Name 07/18/23 1532          Therapy Plan Review/Discharge Plan (OT)    Anticipated Discharge Disposition (OT) skilled nursing facility  -       Row Name 07/18/23 1532          Vital Signs    O2 Delivery Pre Treatment room air  -MW     Pre Patient Position Supine  -MW     Intra Patient Position Standing  -MW     Post Patient Position Sitting  -MW       Row Name 07/18/23 1532          Positioning and Restraints    Pre-Treatment Position in bed  -MW     Post Treatment Position chair  -MW     In Chair notified nsg;reclined;call light within reach;encouraged to call for assist;exit alarm on  -MW               User Key  (r) = Recorded By, (t) = Taken By, (c) = Cosigned By      Initials Name Provider Type    Violet Waldron, CASEY Occupational Therapist                   Outcome Measures       Row Name 07/18/23 1538          How much help from another is currently needed...    Putting on and taking off regular lower body clothing? 3  -MW     Bathing (including washing, rinsing, and drying) 2  -MW     Toileting (which includes using toilet bed pan or urinal) 2  -MW     Putting on and taking off regular upper body clothing 3  -MW     Taking care of personal grooming (such as brushing teeth) 3  -MW     Eating meals 3  -MW     AM-PAC 6 Clicks Score (OT) 16  -MW       Row Name 07/18/23 1525          How much help from another person do you currently need...    Turning from your back to your side while in flat bed without using bedrails? 3  -BH     Moving from lying on back to sitting on the side of a flat bed without bedrails? 3  -BH     Moving to and from a bed to a chair (including a wheelchair)? 3  -BH     Standing up from a chair using your arms (e.g., wheelchair, bedside chair)? 3  -BH     Climbing 3-5 steps with a railing? 2  -BH     To walk in hospital room? 2  -BH     AM-PAC 6 Clicks Score (PT) 16  -BH     Highest level of mobility 5 --> Static standing  -BH        Row Name 07/18/23 1538          Modified Hull Scale    Modified Hull Scale 4 - Moderately severe disability.  Unable to walk without assistance, and unable to attend to own bodily needs without assistance.  -       Row Name 07/18/23 1538 07/18/23 1525       Functional Assessment    Outcome Measure Options AM-PAC 6 Clicks Daily Activity (OT);Modified Hull  -MW AM-PAC 6 Clicks Basic Mobility (PT)  -              User Key  (r) = Recorded By, (t) = Taken By, (c) = Cosigned By      Initials Name Provider Type     Maddy La, PT Physical Therapist     Violet Massey OT Occupational Therapist                    Occupational Therapy Education       Title: PT OT SLP Therapies (In Progress)       Topic: Occupational Therapy (In Progress)       Point: ADL training (In Progress)       Description:   Instruct learner(s) on proper safety adaptation and remediation techniques during self care or transfers.   Instruct in proper use of assistive devices.                  Learning Progress Summary             Patient Acceptance, E, NR by  at 7/18/2023 1538    Comment: role of OT, d/c rec, GOC                         Point: Home exercise program (Not Started)       Description:   Instruct learner(s) on appropriate technique for monitoring, assisting and/or progressing therapeutic exercises/activities.                  Learner Progress:  Not documented in this visit.              Point: Precautions (In Progress)       Description:   Instruct learner(s) on prescribed precautions during self-care and functional transfers.                  Learning Progress Summary             Patient Acceptance, E, NR by  at 7/18/2023 1538    Comment: role of OT, d/c rec, GOC                         Point: Body mechanics (In Progress)       Description:   Instruct learner(s) on proper positioning and spine alignment during self-care, functional mobility activities and/or exercises.                  Learning Progress Summary              Patient Acceptance, E, NR by YANETH at 7/18/2023 4738    Comment: role of OT, d/c rec, GOC                                         User Key       Initials Effective Dates Name Provider Type Discipline    YANETH 08/20/21 -  Violet Massey OT Occupational Therapist OT                  OT Recommendation and Plan  Planned Therapy Interventions (OT): activity tolerance training, functional balance retraining, BADL retraining, patient/caregiver education/training, neuromuscular control/coordination retraining, transfer/mobility retraining, strengthening exercise, ROM/therapeutic exercise, occupation/activity based interventions  Therapy Frequency (OT): 5 times/wk  Plan of Care Review  Plan of Care Reviewed With: patient  Progress: no change  Outcome Evaluation: Pt is a 74 yo male admitted following being found down by family for a number of days, found to have significant renal failure, sepsis and suspected urinary tract infection, rhabdo. PMHx includes autism. Pt seen for OT eval this date, A&Ox2, confusion noted throughout session, unable to orient to situation. Pt following most commands however. Per chart, pt lives alone and family checks in. Per chart, family has noted a mental and physical decline recently. Pt presents with decreased safety, cognition, impaired act tolerance, strength, balance and overall decreased (I) with ADLs and func transfers. He completed bed mon with min A x1, min A x2 for STS progressing to min A x1 for func mob with RWx, Pt able to demo figure 4 with LBD task, edge in place. UIC end of session with all needs met, he will continue to benefit from skilled OT to address noted functional deficits and progress toward prior level of (I). Rec SNF as pt is not safe to return home at this time.     Time Calculation:   Evaluation Complexity (OT)  Review Occupational Profile/Medical/Therapy History Complexity: expanded/moderate complexity  Assessment, Occupational Performance/Identification of  Deficit Complexity: 5 or more performance deficits  Clinical Decision Making Complexity (OT): detailed assessment/moderate complexity  Overall Complexity of Evaluation (OT): moderate complexity     Time Calculation- OT       Row Name 07/18/23 1539 07/18/23 1525          Time Calculation- OT    OT Start Time 1330  -MW --     OT Stop Time 1355  -MW --     OT Time Calculation (min) 25 min  -MW --     Total Timed Code Minutes- OT 19 minute(s)  -MW --     OT Received On 07/18/23  -MW --     OT - Next Appointment 07/19/23  -MW --     OT Goal Re-Cert Due Date 08/01/23  -MW --        Timed Charges    68969 - Gait Training Minutes  -- 10  -BH     35230 - OT Therapeutic Activity Minutes 9  -MW --     30918 - OT Self Care/Mgmt Minutes 10  -MW --        Untimed Charges    OT Eval/Re-eval Minutes 6  -MW --        Total Minutes    Timed Charges Total Minutes 19  -MW 10  -BH     Untimed Charges Total Minutes 6  -MW --      Total Minutes 25  -MW 10  -BH               User Key  (r) = Recorded By, (t) = Taken By, (c) = Cosigned By      Initials Name Provider Type     Maddy La, PT Physical Therapist     Violet Massey OT Occupational Therapist                  Therapy Charges for Today       Code Description Service Date Service Provider Modifiers Qty    68701531502 HC OT SELF CARE/MGMT/TRAIN EA 15 MIN 7/18/2023 Violet Massey OT GO 1    [id number] HC OT EVAL MOD COMPLEXITY 2 7/18/2023 Violet Massey OT GO 1                 Violet Massey OT  7/18/2023

## 2023-07-18 NOTE — PLAN OF CARE
Goal Outcome Evaluation:  Plan of Care Reviewed With: patient           Outcome Evaluation: Pt iis a 74 yo M admitted after being found down in his home - per chart was on floor for possibly 5 days or so. Work-up revealed DERECK with sepsis 2/2 UTI. Pt confused on eval, states he has a rwx at home but unable to give much history and unsure of accuracy. Per chart, pt lives alone and his family checks on him. Pt presents to PT with impaired strength, endurance, and balance limiting overall mobility. Pt transferred to EOB with min A x1, stood with min A x2, and ambulated 30ft with rwx and min A. Pt with some difficulty with rwx navigation - needing cues and manual assist for obstacle avoidance. Pt fatigues quickly but no overt LOB with mobility today. Pt agreeable to sitting UIC, assisted with repositioning and left with needs met. PT will continue to follow to progress mobility as tolerated. Anticipate DC to SNF.      Anticipated Discharge Disposition (PT): skilled nursing facility

## 2023-07-18 NOTE — PLAN OF CARE
Goal Outcome Evaluation:  Plan of Care Reviewed With: patient       Outcome Evaluation: Clinical swallow evaluation completed. Suspect oropharyngeal dysphagia impacted by generalized weakness. Patient demonstrated immediate coughing with straw sips of thin liquids, delayed coughing with cup sips. Weak cough. No overt s/s of aspiration with nectar thick liquids via cup sips, puree, or mechanical soft. Patient with prolonged mastication with mechanical soft (whole) trials. Concern for fatigue. Deferred regular trials. Recommend a mechanical soft diet with chopped meats and nectar thick liquids. Meds as tolerated with puree or nectar thick liquids. Ice chips or small sips of water 30 minutes after meals following oral care. Safe swallow strategies: upright for PO intake, small bites/sips, no straws, fatigue precautions, aspiration precautions. SLP to follow for diet tolerance and monitor readiness for re-evaluation versus instrumental swallow evaluation.

## 2023-07-19 LAB
ALBUMIN SERPL-MCNC: 2.3 G/DL (ref 3.5–5.2)
ANION GAP SERPL CALCULATED.3IONS-SCNC: 14 MMOL/L (ref 5–15)
BASOPHILS # BLD AUTO: 0.04 10*3/MM3 (ref 0–0.2)
BASOPHILS NFR BLD AUTO: 0.3 % (ref 0–1.5)
BUN SERPL-MCNC: 91 MG/DL (ref 8–23)
BUN/CREAT SERPL: 28.1 (ref 7–25)
CALCIUM SPEC-SCNC: 8.1 MG/DL (ref 8.6–10.5)
CHLORIDE SERPL-SCNC: 113 MMOL/L (ref 98–107)
CK SERPL-CCNC: 1437 U/L (ref 20–200)
CO2 SERPL-SCNC: 17 MMOL/L (ref 22–29)
CREAT SERPL-MCNC: 3.24 MG/DL (ref 0.76–1.27)
DEPRECATED RDW RBC AUTO: 43.2 FL (ref 37–54)
EGFRCR SERPLBLD CKD-EPI 2021: 19.5 ML/MIN/1.73
EOSINOPHIL # BLD AUTO: 0.14 10*3/MM3 (ref 0–0.4)
EOSINOPHIL NFR BLD AUTO: 1.1 % (ref 0.3–6.2)
ERYTHROCYTE [DISTWIDTH] IN BLOOD BY AUTOMATED COUNT: 13.4 % (ref 12.3–15.4)
GLUCOSE SERPL-MCNC: 71 MG/DL (ref 65–99)
HCT VFR BLD AUTO: 38.2 % (ref 37.5–51)
HGB BLD-MCNC: 12.7 G/DL (ref 13–17.7)
LYMPHOCYTES # BLD AUTO: 1.59 10*3/MM3 (ref 0.7–3.1)
LYMPHOCYTES NFR BLD AUTO: 12.7 % (ref 19.6–45.3)
MCH RBC QN AUTO: 29.7 PG (ref 26.6–33)
MCHC RBC AUTO-ENTMCNC: 33.2 G/DL (ref 31.5–35.7)
MCV RBC AUTO: 89.3 FL (ref 79–97)
MONOCYTES # BLD AUTO: 1.26 10*3/MM3 (ref 0.1–0.9)
MONOCYTES NFR BLD AUTO: 10 % (ref 5–12)
NEUTROPHILS NFR BLD AUTO: 75.4 % (ref 42.7–76)
NEUTROPHILS NFR BLD AUTO: 9.45 10*3/MM3 (ref 1.7–7)
PHOSPHATE SERPL-MCNC: 2.9 MG/DL (ref 2.5–4.5)
PLATELET # BLD AUTO: 47 10*3/MM3 (ref 140–450)
PMV BLD AUTO: 12.1 FL (ref 6–12)
POTASSIUM SERPL-SCNC: 4.6 MMOL/L (ref 3.5–5.2)
RBC # BLD AUTO: 4.28 10*6/MM3 (ref 4.14–5.8)
SODIUM SERPL-SCNC: 144 MMOL/L (ref 136–145)
WBC NRBC COR # BLD: 12.54 10*3/MM3 (ref 3.4–10.8)

## 2023-07-19 PROCEDURE — 36415 COLL VENOUS BLD VENIPUNCTURE: CPT | Performed by: INTERNAL MEDICINE

## 2023-07-19 PROCEDURE — 97110 THERAPEUTIC EXERCISES: CPT

## 2023-07-19 PROCEDURE — 80069 RENAL FUNCTION PANEL: CPT | Performed by: INTERNAL MEDICINE

## 2023-07-19 PROCEDURE — 85025 COMPLETE CBC W/AUTO DIFF WBC: CPT | Performed by: INTERNAL MEDICINE

## 2023-07-19 PROCEDURE — 82550 ASSAY OF CK (CPK): CPT | Performed by: INTERNAL MEDICINE

## 2023-07-19 RX ADMIN — Medication 10 ML: at 22:57

## 2023-07-19 RX ADMIN — SODIUM BICARBONATE: 84 INJECTION, SOLUTION INTRAVENOUS at 22:57

## 2023-07-19 RX ADMIN — FAMOTIDINE 20 MG: 20 TABLET, FILM COATED ORAL at 08:13

## 2023-07-19 RX ADMIN — SENNOSIDES AND DOCUSATE SODIUM 2 TABLET: 50; 8.6 TABLET ORAL at 22:02

## 2023-07-19 RX ADMIN — FAMOTIDINE 20 MG: 20 TABLET, FILM COATED ORAL at 22:01

## 2023-07-19 RX ADMIN — SODIUM BICARBONATE: 84 INJECTION, SOLUTION INTRAVENOUS at 14:45

## 2023-07-19 RX ADMIN — MIRTAZAPINE 15 MG: 15 TABLET, FILM COATED ORAL at 22:01

## 2023-07-19 RX ADMIN — SENNOSIDES AND DOCUSATE SODIUM 2 TABLET: 50; 8.6 TABLET ORAL at 08:13

## 2023-07-19 RX ADMIN — SODIUM BICARBONATE: 84 INJECTION, SOLUTION INTRAVENOUS at 08:27

## 2023-07-19 RX ADMIN — Medication 10 ML: at 08:13

## 2023-07-19 NOTE — THERAPY TREATMENT NOTE
Patient Name: Pedro Peck  : 1950    MRN: [id number]                              Today's Date: 2023       Admit Date: 2023    Visit Dx:     ICD-10-CM ICD-9-CM   1. Traumatic rhabdomyolysis, initial encounter  T79.6XXA 958.6   2. DERECK (acute kidney injury)  N17.9 584.9     Patient Active Problem List   Diagnosis    DERECK (acute kidney injury)    Severe malnutrition    Vitamin D deficiency    Hypophosphatemia    Posterior vitreous detachment    Age-related nuclear cataract of both eyes    Rhabdomyolysis    Sepsis secondary to UTI    Acute cystitis without hematuria    Diarrhea    Sepsis due to Gram negative bacteria    Bacteremia    Thrombocytopenia     Past Medical History:   Diagnosis Date    Autism     Falls      Past Surgical History:   Procedure Laterality Date    PROSTATECTOMY        General Information       Row Name 23          Physical Therapy Time and Intention    Document Type therapy note (daily note)  -     Mode of Treatment individual therapy;physical therapy  -       Row Name 23          General Information    Patient Profile Reviewed yes  -     Existing Precautions/Restrictions fall  -       Row Name 23          Living Environment    People in Home alone  -       Row Name 23          Cognition    Orientation Status (Cognition) oriented to;person;place  needed extra time to answer  -       Row Name 23          Safety Issues, Functional Mobility    Safety Issues Affecting Function (Mobility) insight into deficits/self-awareness;judgment;positioning of assistive device;problem-solving;safety precaution awareness;sequencing abilities  -     Impairments Affecting Function (Mobility) balance;cognition;coordination;endurance/activity tolerance;postural/trunk control;strength  -               User Key  (r) = Recorded By, (t) = Taken By, (c) = Cosigned By      Initials Name Provider Type    Mariana Rogel PTA Physical  Therapist Assistant                   Mobility       Row Name 07/19/23 1657          Bed Mobility    Supine-Sit [place] (Bed Mobility) minimum assist (75% patient effort);moderate assist (50% patient effort)  post lean  -       Row Name 07/19/23 1657          Sit-Stand Transfer    Sit-Stand [place] (Transfers) 2 person assist;minimum assist (75% patient effort);verbal cues;nonverbal cues (demo/gesture)  -     Assistive Device (Sit-Stand Transfers) walker, front-wheeled  -     Comment, (Sit-Stand Transfer) needed extra time  -       Row Name 07/19/23 1657          Gait/Stairs (Locomotion)    [place] Level (Gait) 2 person assist;minimum assist (75% patient effort);contact guard;1 person to manage equipment  -     Assistive Device (Gait) walker, front-wheeled  -     Distance in Feet (Gait) 40ft, cues for posture and to stay centered in rwx, assist to guide rwx  -     Deviations/Abnormal Patterns (Gait) kevyn decreased;festinating/shuffling;stride length decreased  -     Bilateral Gait Deviations forward flexed posture  -     Comment, (Gait/Stairs) squinting eyes, unclear if pt has visual deficit  -               User Key  (r) = Recorded By, (t) = Taken By, (c) = Cosigned By      Initials Name Provider Type    Mariana Rogel PTA Physical Therapist Assistant                   Obj/Interventions       Row Name 07/19/23 1700          Motor Skills    Therapeutic Exercise --  LAQS, seated MIP x10 reps  -               User Key  (r) = Recorded By, (t) = Taken By, (c) = Cosigned By      Initials Name Provider Type    Mariana Rogel PTA Physical Therapist Assistant                   Goals/Plan    No documentation.                  Clinical Impression       Row Name 07/19/23 1700          Pain    Pretreatment Pain Rating 0/10 - no pain  -JM       Row Name 07/19/23 1700          Plan of Care Review    Plan of Care Reviewed With patient  -     Outcome Evaluation Pt agreed to PT  session, tolerated amb ~40ft w/assist of 2, pt requireed extra time to complete tasks, extra time to respond to questions, very pleasant and wanted to sit in chair after ambulation, plans SNU  -       Row Name 07/19/23 1700          Therapy Assessment/Plan (PT)    Rehab Potential (PT) good, to achieve stated therapy goals  -     Criteria for Skilled Interventions Met (PT) yes  -       Row Name 07/19/23 1700          Vital Signs    O2 Delivery Pre Treatment room air  -       Row Name 07/19/23 1700          Positioning and Restraints    Pre-Treatment Position in bed  -     Post Treatment Position chair  -     In Chair reclined;call light within reach;encouraged to call for assist;exit alarm on;notified [facility] [place] [facility]  -               User Key  (r) = Recorded By, (t) = Taken By, (c) = Cosigned By      Initials Name Provider Type    Mariana Rogel PTA Physical Therapist Assistant                   Outcome Measures       Row Name 07/19/23 1702 07/19/23 0815       How much help from another person do you currently need...    Turning from your back to your side while in flat bed without using bedrails? 3  - 3  -LB    Moving from lying on back to sitting on the side of a flat bed without bedrails? 2  -JM 3  -LB    Moving to and from a bed to a chair (including a wheelchair)? 3  -JM 3  -LB    Standing up from a chair using your arms (e.g., wheelchair, bedside chair)? 3  - 3  -LB    Climbing 3-5 steps with a railing? 1  -JM 2  -LB    To walk in hospital room? 3  - 2  -LB    AM-PAC 6 Clicks Score (PT) 15  - 16  -LB    Highest level of mobility 4 --> Transferred to chair/commode  - 5 --> Static standing  -LB              User Key  (r) = Recorded By, (t) = Taken By, (c) = Cosigned By      Initials Name Provider Type    Mariana Rogel PTA Physical Therapist Assistant    Xin Gonzáles, RN Registered Nurse                                 Physical Therapy Education       Title: PT OT SLP Therapies (In  Progress)       Topic: Physical Therapy (Done)       Point: Mobility training (Done)       Learning Progress Summary             Patient Acceptance, E,D, VU,NR by  at 7/19/2023 1702    Acceptance, E,TB,D, NR,VU by  at 7/18/2023 1525                         Point: Home exercise program (Done)       Learning Progress Summary             Patient Acceptance, E,D, VU,NR by  at 7/19/2023 1702    Acceptance, E,TB,D, NR,VU by  at 7/18/2023 1525                         Point: Body mechanics (Done)       Learning Progress Summary             Patient Acceptance, E,D, VU,NR by  at 7/19/2023 1702    Acceptance, E,TB,D, NR,VU by  at 7/18/2023 1525                         Point: Precautions (Done)       Learning Progress Summary             Patient Acceptance, E,D, VU,NR by  at 7/19/2023 1702    Acceptance, E,TB,D, NR,VU by  at 7/18/2023 1525                                         User Key       Initials Effective Dates Name Provider Type [facility] 03/07/18 -  Mariana Harvey PTA Physical Therapist Assistant PT     04/08/22 -  Maddy La PT Physical Therapist PT                  PT Recommendation and Plan     Plan of Care Reviewed With: patient  Outcome Evaluation: Pt agreed to PT session, tolerated amb ~40ft w/assist of 2, pt requireed extra time to complete tasks, extra time to respond to questions, very pleasant and wanted to sit in chair after ambulation, plans SNU     Time Calculation:         PT Charges       Row Name 07/19/23 1703             Time Calculation    Start Time 1500  -      Stop Time 1517  -      Time Calculation (min) 17 min  -      PT Received On 07/19/23  -      PT - Next Appointment 07/20/23  -                User Key  (r) = Recorded By, (t) = Taken By, (c) = Cosigned By      Initials Name Provider Type    Mariana Rogel PTA Physical Therapist Assistant                  Therapy Charges for Today       Code Description Service Date Service Provider Modifiers  Qty    83027174873 HC PT THER PROC EA 15 MIN 7/19/2023 Mariana Harvey PTA GP 1    02967603604 HC PT THER SUPP EA 15 MIN 7/19/2023 Mariana Harvey, MEGAN GP 1            PT G-Codes  Outcome Measure Options: AM-PAC 6 Clicks Daily Activity (OT), Modified [place]  AM-PAC 6 Clicks Score (PT): 15  AM-PAC 6 Clicks Score (OT): 16  Modified [place] Scale: 4 - Moderately severe disability.  Unable to walk without assistance, and unable to attend to own bodily needs without assistance.  PT Discharge Summary  Anticipated Discharge Disposition (PT): skilled nursing facility    Mariana Harvey PTA  7/19/2023

## 2023-07-19 NOTE — PROGRESS NOTES
Name: Pedro Peck ADMIT: 2023   : 1950  PCP: Brandon Rosen APRN    MRN: [id number] LOS: 2 days   AGE/SEX: 73 y.o. male  ROOM: [facility]   Subjective   Chief Complaint   Patient presents with    Fall   73-year-old with history of recent prostate surgery with indwelling Edge catheter who presents after being found down by himself for a number of days and found to have significant renal failure, sepsis and suspected urinary tract infection, rhabdo.     On IVFs  On IV ABX  Seen by ST- now on diet    Feeling better today  BP still is low-normal      ROS  No f/c  No n/v  No cp/palp  No soa/cough    Objective   Vital Signs  Temp:  [95.4 °F (35.2 °C)-97.5 °F (36.4 °C)] 97.5 °F (36.4 °C)  Heart Rate:  [] 60  Resp:  [18] 18  BP: ()/(62-68) 99/67  SpO2:  [92 %-97 %] 97 %  on   ;   Device (Oxygen Therapy): room air  Body mass index is 20.81 kg/m².    Physical Exam  Constitutional:       General: He is in acute distress.      Appearance: He is ill-appearing.   HENT:      Head: Normocephalic and atraumatic.   Eyes:      General: No scleral icterus.  Cardiovascular:      Rate and Rhythm: Normal rate and regular rhythm.      Heart sounds: Normal heart sounds.   Pulmonary:      Effort: Pulmonary effort is normal. No respiratory distress.      Breath sounds: Normal breath sounds.   Abdominal:      General: There is no distension.      Palpations: Abdomen is soft.      Tenderness: There is no abdominal tenderness.   Musculoskeletal:      Cervical back: Neck supple.   Skin:     Coloration: Skin is pale.   Neurological:      Mental Status: He is alert.   +edge with dark urine, but not blood - starting to clear more on     Results Review:       I reviewed the patient's new clinical results.  Results from last 7 days   Lab Units 23  0718 23  0619 23  0026 23  1034   WBC 10*3/mm3 12.54* 18.42*  --  29.24*   HEMOGLOBIN g/dL 12.7* 13.1 13.4 14.2   PLATELETS 10*3/mm3 47* 45*  --   53*       Results from last 7 days   Lab Units 07/19/23  0718 07/18/23  0619 07/17/23  1034   SODIUM mmol/L 144 141 141   POTASSIUM mmol/L 4.6 4.6 5.5*   CHLORIDE mmol/L 113* 111* 105   CO2 mmol/L 17.0* 18.0* 18.0*   BUN mg/dL 91* 91* 89*   CREATININE mg/dL 3.24* 4.51* 6.58*   GLUCOSE mg/dL 71 71 69     Estimated Creatinine Clearance: 19.2 mL/min (A) (by C-G formula based on SCr of 3.24 mg/dL (H)).  Results from last 7 days   Lab Units 07/19/23  0718 07/18/23  0619 07/17/23  1034   ALBUMIN g/dL 2.3* 2.4* 3.1*   BILIRUBIN mg/dL  --   --  1.6*   ALK PHOS U/L  --   --  137*   AST (SGOT) U/L  --   --  430*   ALT (SGPT) U/L  --   --  154*       Results from last 7 days   Lab Units 07/19/23  0718 07/18/23  0619 07/17/23  1034   CALCIUM mg/dL 8.1* 8.2* 8.9   ALBUMIN g/dL 2.3* 2.4* 3.1*   PHOSPHORUS mg/dL 2.9 3.7  --        Results from last 7 days   Lab Units 07/18/23  0026 07/17/23  1727 07/17/23  1151   LACTATE mmol/L 1.7 2.5* 2.4*         Coag     HbA1C   Lab Results   Component Value Date    HGBA1C 5.69 (H) 11/10/2021     Infection   Results from last 7 days   Lab Units 07/17/23  1243 07/17/23  1151 07/17/23  1038   BLOODCX  Gram Negative Bacilli* No growth at 2 days  --    URINECX   --   --  >100,000 CFU/mL Gram Negative Bacilli*   BCIDPCR  Enterobacterales spp. Identification by BCID2 PCR.*  --   --        Radiology(recent) CT Abdomen Pelvis Without Contrast    Result Date: 7/17/2023  1. Lower thoracic lymphadenopathy. Correlate with history and physical exam 2. Limited examination of the kidneys demonstrate parapelvic cyst on the left and renal atrophy with mild hydronephrosis on the right 3. Layering material in the urinary bladder. Correlate with urinalysis 4. Thick-walled, trabeculated urinary bladder, suggestive of outlet obstruction 5. Urinary bladder distention. Consider catheterization 6. Additional incidental findings as above.  This report was finalized on 7/17/2023 1:33 PM by Dr. Eran Jansen M.D.      US  Renal Bilateral    Result Date: 7/18/2023  1.  Previously seen right sided hydronephrosis is no longer visualized. Innumerable hypoechoic rounded areas over the left renal pelvis likely and at least in part correspond with extensive parapelvic renal cyst seen on prior CT. While no definite hydronephrosis is seen, the extent of parapelvic cysts significantly limits evaluation. If there is continued clinical concern for renal obstruction, further evaluation with CT of the abdomen and pelvis is recommended to better characterize. 2.  Complex septated right renal lesion measuring up to 2.1 cm, indeterminate. Further evaluation multiphase CT or MRI abdomen with and without contrast recommended to better characterize and exclude neoplasm. 3.  Other findings above.  This report was finalized on 7/18/2023 5:56 PM by Dr. Jeffry Hedrick M.D.     No results found for: TROPONINT, TROPONINI, BNP  No components found for: TSH;2    cefepime, 2,000 mg, Intravenous, Q24H  famotidine, 20 mg, Oral, BID  mirtazapine, 15 mg, Oral, Nightly  senna-docusate sodium, 2 tablet, Oral, BID  sodium chloride, 10 mL, Intravenous, Q12H      custom IV infusion builder, , Last Rate: 150 mL/hr at 07/19/23 0827    Diet: Regular/House Diet; No Straw, No Mixed Consistencies; Texture: Soft to Chew (NDD 3); Soft to Chew: Chopped Meat; Fluid Consistency: Nectar Thick      Assessment & Plan      Active Hospital Problems    Diagnosis  POA    **DERECK (acute kidney injury) [N17.9]  Yes    Sepsis due to Gram negative bacteria [A41.50]  Unknown    Bacteremia [R78.81]  Unknown    Thrombocytopenia [D69.6]  Unknown    Rhabdomyolysis [M62.82]  Unknown    Sepsis secondary to UTI [A41.9, N39.0]  Unknown    Acute cystitis without hematuria [N30.00]  Unknown    Diarrhea [R19.7]  Unknown      Resolved Hospital Problems   No resolved problems to display.   73-year-old with history of recent prostate surgery with indwelling White catheter who presents after being found down  by himself for a number of days and found to have significant renal failure, sepsis and suspected urinary tract infection, rhabdo.     We will admit him to the hospital given him aggressive IV fluids, IV antibiotics.  Monitor renal function closely and follow-up cultures. Change to IV cefepime with bactremia  Monitor daily CK. Repeat BC tomorrow     Discussion with patient's sister and niece (on 7/17) he has had a significant mental and physical decline over the past few months to year.  He will likely need subacute rehab after stabilization.  He likely will have continued decline in the future.  Also discussed with them that he is significantly ill and this is a life-threatening condition.    Thanks to nephrology who is following. No hematuria so no indication for Urology appt at this time- chronic edge (exchanged here) and follows with [facility] who does not come to this hospital.     Thrombocytopenia likely related to sepsis/infection, monitor. Avoid blood thinners at this time.     DW staff  Reviewed records      Travis Nelson MD  [facility]  07/19/23  12:50 EDT

## 2023-07-19 NOTE — PLAN OF CARE
Goal Outcome Evaluation:  Plan of Care Reviewed With: patient           Outcome Evaluation: Pt agreed to PT session, tolerated amb ~40ft w/assist of 2, pt requireed extra time to complete tasks, extra time to respond to questions, very pleasant and wanted to sit in chair after ambulation, plans SNU      Anticipated Discharge Disposition (PT): skilled nursing facility

## 2023-07-19 NOTE — PLAN OF CARE
Goal Outcome Evaluation:  Plan of Care Reviewed With: patient        Progress: no change  Outcome Evaluation: VSS. A&Ox3. No c/o pain. Worked with PT. Tolerating diet w/ nec thick liquids. IVF infusing. White in place. No acute events this shift.

## 2023-07-19 NOTE — PROGRESS NOTES
" LOS: 2 days   Patient Care Team:  Brandon Rosen APRN as PCP - General (Nurse Practitioner)    Chief Complaint: DERECK/rhabdo        History of Present Illness  This is a 73 y.o. year old male  with no prior history of chronic kidney disease.  Did have DERECK in 2021 that improved and creatinine was near baseline, 1.1 at discharge.  Medical history significant for autism who presented to the ER with a fall.  Patient lives alone but family went to check on him as he was not answering his phone.  Was found down on the floor, confused, lying in feces.  Initial labs significant for severe renal failure, creatinine 6.5, CK greater than 11,000 with UTI.  He was hypotensive into the 90s and was fluid resuscitated in the ER per sepsis protocol and started on IV antibiotics.  BUN 89, potassium 5.5 last night in the ER    Subjective  Patient feeling better  Denies sob or chest pain  Tolerating po  History taken from: patient chart    Objective     Vital Sign Min/Max for last 24 hours  Temp  Min: 95.4 °F (35.2 °C)  Max: 98 °F (36.7 °C)   BP  Min: 92/70  Max: 104/68   Pulse  Min: 60  Max: 100   Resp  Min: 18  Max: 18   SpO2  Min: 92 %  Max: 97 %   No data recorded   No data recorded     Flowsheet Rows      Flowsheet Row First Filed Value   Admission Height 177.8 cm (70\") Documented at 07/17/2023 0957   Admission Weight 65.8 kg (145 lb) Documented at 07/17/2023 0957            No intake/output data recorded.  I/O last 3 completed shifts:  In: -   Out: 3350 [Urine:3350]    Objective:  Vital signs: (most recent): Blood pressure 99/67, pulse 60, temperature 97.5 °F (36.4 °C), temperature source Oral, resp. rate 18, height 177.8 cm (70\"), weight 65.8 kg (145 lb), SpO2 97 %.          Physical Examination: General appearance - alert, well appearing, and in no distress  Mental status - alert, oriented to person, place, and time  Eyes - pupils equal and reactive, extraocular eye movements intact  Chest - clear to auscultation, no " wheezes, rales or rhonchi, symmetric air entry  Heart - normal rate, regular rhythm, normal S1, S2, no murmurs, rubs, clicks or gallops  Abdomen - soft, nontender, nondistended, no masses or organomegaly  Neurological - alert, oriented, normal speech, no focal findings or movement disorder noted  Extremities - peripheral pulses normal, no pedal edema, no clubbing or cyanosis      Results Review:     I reviewed the patient's new clinical results.    WBC WBC   Date Value Ref Range Status   07/19/2023 12.54 (H) 3.40 - 10.80 10*3/mm3 Final   07/18/2023 18.42 (H) 3.40 - 10.80 10*3/mm3 Final   07/17/2023 29.24 (H) 3.40 - 10.80 10*3/mm3 Final      HGB Hemoglobin   Date Value Ref Range Status   07/19/2023 12.7 (L) 13.0 - 17.7 g/dL Final   07/18/2023 13.1 13.0 - 17.7 g/dL Final   07/18/2023 13.4 13.0 - 17.7 g/dL Final   07/17/2023 14.2 13.0 - 17.7 g/dL Final      HCT Hematocrit   Date Value Ref Range Status   07/19/2023 38.2 37.5 - 51.0 % Final   07/18/2023 37.9 37.5 - 51.0 % Final   07/18/2023 38.1 37.5 - 51.0 % Final   07/17/2023 42.2 37.5 - 51.0 % Final      Platlets No results found for: LABPLAT   MCV MCV   Date Value Ref Range Status   07/19/2023 89.3 79.0 - 97.0 fL Final   07/18/2023 89.0 79.0 - 97.0 fL Final   07/17/2023 88.8 79.0 - 97.0 fL Final          Sodium Sodium   Date Value Ref Range Status   07/19/2023 144 136 - 145 mmol/L Final   07/18/2023 141 136 - 145 mmol/L Final   07/17/2023 141 136 - 145 mmol/L Final      Potassium Potassium   Date Value Ref Range Status   07/19/2023 4.6 3.5 - 5.2 mmol/L Final     Comment:     Slight hemolysis detected by analyzer. Results may be affected.   07/18/2023 4.6 3.5 - 5.2 mmol/L Final     Comment:     Slight hemolysis detected by analyzer. Results may be affected.   07/17/2023 5.5 (H) 3.5 - 5.2 mmol/L Final      Chloride Chloride   Date Value Ref Range Status   07/19/2023 113 (H) 98 - 107 mmol/L Final   07/18/2023 111 (H) 98 - 107 mmol/L Final   07/17/2023 105 98 - 107  mmol/L Final      CO2 CO2   Date Value Ref Range Status   07/19/2023 17.0 (L) 22.0 - 29.0 mmol/L Final   07/18/2023 18.0 (L) 22.0 - 29.0 mmol/L Final   07/17/2023 18.0 (L) 22.0 - 29.0 mmol/L Final      BUN BUN   Date Value Ref Range Status   07/19/2023 91 (H) 8 - 23 mg/dL Final   07/18/2023 91 (H) 8 - 23 mg/dL Final   07/17/2023 89 (H) 8 - 23 mg/dL Final      Creatinine Creatinine   Date Value Ref Range Status   07/19/2023 3.24 (H) 0.76 - 1.27 mg/dL Final   07/18/2023 4.51 (H) 0.76 - 1.27 mg/dL Final   07/17/2023 6.58 (H) 0.76 - 1.27 mg/dL Final      Calcium Calcium   Date Value Ref Range Status   07/19/2023 8.1 (L) 8.6 - 10.5 mg/dL Final   07/18/2023 8.2 (L) 8.6 - 10.5 mg/dL Final   07/17/2023 8.9 8.6 - 10.5 mg/dL Final      PO4 No results found for: CAPO4   Albumin Albumin   Date Value Ref Range Status   07/19/2023 2.3 (L) 3.5 - 5.2 g/dL Final   07/18/2023 2.4 (L) 3.5 - 5.2 g/dL Final   07/17/2023 3.1 (L) 3.5 - 5.2 g/dL Final      Magnesium No results found for: MG   Uric Acid Uric Acid   Date Value Ref Range Status   07/18/2023 9.8 (H) 3.4 - 7.0 mg/dL Final        Medication Review:     Current Facility-Administered Medications:     acetaminophen (TYLENOL) tablet 650 mg, 650 mg, Oral, Q4H PRN **OR** acetaminophen (TYLENOL) 160 MG/5ML solution 650 mg, 650 mg, Oral, Q4H PRN **OR** acetaminophen (TYLENOL) suppository 650 mg, 650 mg, Rectal, Q4H PRN, Edvin, Kamari A, APRN    sennosides-docusate (PERICOLACE) 8.6-50 MG per tablet 2 tablet, 2 tablet, Oral, BID, 2 tablet at 07/19/23 0813 **AND** polyethylene glycol (MIRALAX) packet 17 g, 17 g, Oral, Daily PRN **AND** bisacodyl (DULCOLAX) EC tablet 5 mg, 5 mg, Oral, Daily PRN **AND** bisacodyl (DULCOLAX) suppository 10 mg, 10 mg, Rectal, Daily PRN, Kamari Pardo, APRN    cefepime 2 gm IVPB in 100 ml NS (VTB), 2,000 mg, Intravenous, Q24H, Travis Nelson MD, 2,000 mg at 07/18/23 2356    famotidine (PEPCID) tablet 20 mg, 20 mg, Oral, BID, Travis Nelson MD, 20  mg at 07/19/23 0813    mirtazapine (REMERON) tablet 15 mg, 15 mg, Oral, Nightly, Travis Nelson MD, 15 mg at 07/18/23 1930    nitroglycerin (NITROSTAT) SL tablet 0.4 mg, 0.4 mg, Sublingual, Q5 Min PRN, Kamari Pardo APRN    ondansetron (ZOFRAN) tablet 4 mg, 4 mg, Oral, Q6H PRN **OR** ondansetron (ZOFRAN) injection 4 mg, 4 mg, Intravenous, Q6H PRN, Kamari Pardo APRN    sodium chloride 0.45 % 925 mL with sodium bicarbonate 8.4 % 75 mEq infusion, , Intravenous, Continuous, Brian Farrar MD, Last Rate: 150 mL/hr at 07/19/23 0827, New Bag at 07/19/23 0827    sodium chloride 0.9 % flush 10 mL, 10 mL, Intravenous, Q12H, Kamari Pardo APRN, 10 mL at 07/19/23 0813    sodium chloride 0.9 % flush 10 mL, 10 mL, Intravenous, PRN, Kamari Pardo APRN    sodium chloride 0.9 % infusion 40 mL, 40 mL, Intravenous, PRN, Kamari Pardo APRN      Assessment & Plan       DERECK (acute kidney injury)    Rhabdomyolysis    Sepsis secondary to UTI    Acute cystitis without hematuria    Diarrhea    Sepsis due to Gram negative bacteria    Bacteremia    Thrombocytopenia      Assessment & Plan  DERECK  Rhabdomyolysis   UTI with sepsis   Diarrhea   Hyperkalemia   Metabolic acidosis/lactic acidosis   Hypotension   Hydronephrosis   Thrombocytopenia   Bacteremia     Renal function improving  CK down trending. No need for daily check per my standpoint   Continue IVF (currently IVF with bicarb)  Metabolic acidosis should improve as his renal function continues to improve.   Lactic acidosis resolved.   Abx per primary   Will follow     Yaquelin Chavez MD  07/19/23  10:35 EDT

## 2023-07-20 LAB
ALBUMIN SERPL-MCNC: 2.8 G/DL (ref 3.5–5.2)
ANION GAP SERPL CALCULATED.3IONS-SCNC: 11 MMOL/L (ref 5–15)
BACTERIA SPEC AEROBE CULT: ABNORMAL
BUN SERPL-MCNC: 72 MG/DL (ref 8–23)
BUN/CREAT SERPL: 29 (ref 7–25)
CALCIUM SPEC-SCNC: 8.5 MG/DL (ref 8.6–10.5)
CHLORIDE SERPL-SCNC: 110 MMOL/L (ref 98–107)
CK SERPL-CCNC: 595 U/L (ref 20–200)
CO2 SERPL-SCNC: 28 MMOL/L (ref 22–29)
CREAT SERPL-MCNC: 2.48 MG/DL (ref 0.76–1.27)
DEPRECATED RDW RBC AUTO: 44.6 FL (ref 37–54)
EGFRCR SERPLBLD CKD-EPI 2021: 26.9 ML/MIN/1.73
EOSINOPHIL # BLD MANUAL: 0.2 10*3/MM3 (ref 0–0.4)
EOSINOPHIL NFR BLD MANUAL: 2 % (ref 0.3–6.2)
ERYTHROCYTE [DISTWIDTH] IN BLOOD BY AUTOMATED COUNT: 13.5 % (ref 12.3–15.4)
GLUCOSE SERPL-MCNC: 95 MG/DL (ref 65–99)
GRAM STN SPEC: ABNORMAL
HCT VFR BLD AUTO: 42.8 % (ref 37.5–51)
HGB BLD-MCNC: 14.4 G/DL (ref 13–17.7)
ISOLATED FROM: ABNORMAL
LYMPHOCYTES # BLD MANUAL: 0.91 10*3/MM3 (ref 0.7–3.1)
LYMPHOCYTES NFR BLD MANUAL: 10.1 % (ref 5–12)
MAGNESIUM SERPL-MCNC: 2.1 MG/DL (ref 1.6–2.4)
MCH RBC QN AUTO: 30.3 PG (ref 26.6–33)
MCHC RBC AUTO-ENTMCNC: 33.6 G/DL (ref 31.5–35.7)
MCV RBC AUTO: 89.9 FL (ref 79–97)
MONOCYTES # BLD: 1.01 10*3/MM3 (ref 0.1–0.9)
NEUTROPHILS # BLD AUTO: 7.87 10*3/MM3 (ref 1.7–7)
NEUTROPHILS NFR BLD MANUAL: 78.8 % (ref 42.7–76)
PHOSPHATE SERPL-MCNC: 2.6 MG/DL (ref 2.5–4.5)
PLAT MORPH BLD: NORMAL
PLATELET # BLD AUTO: 66 10*3/MM3 (ref 140–450)
PMV BLD AUTO: 11 FL (ref 6–12)
POTASSIUM SERPL-SCNC: 3.8 MMOL/L (ref 3.5–5.2)
RBC # BLD AUTO: 4.76 10*6/MM3 (ref 4.14–5.8)
RBC MORPH BLD: NORMAL
SODIUM SERPL-SCNC: 149 MMOL/L (ref 136–145)
VARIANT LYMPHS NFR BLD MANUAL: 9.1 % (ref 19.6–45.3)
WBC MORPH BLD: NORMAL
WBC NRBC COR # BLD: 9.99 10*3/MM3 (ref 3.4–10.8)

## 2023-07-20 PROCEDURE — 0 CEFEPIME PER 500 MG: Performed by: INTERNAL MEDICINE

## 2023-07-20 PROCEDURE — 83735 ASSAY OF MAGNESIUM: CPT | Performed by: INTERNAL MEDICINE

## 2023-07-20 PROCEDURE — 85025 COMPLETE CBC W/AUTO DIFF WBC: CPT | Performed by: INTERNAL MEDICINE

## 2023-07-20 PROCEDURE — 92526 ORAL FUNCTION THERAPY: CPT | Performed by: SPEECH-LANGUAGE PATHOLOGIST

## 2023-07-20 PROCEDURE — 36415 COLL VENOUS BLD VENIPUNCTURE: CPT | Performed by: INTERNAL MEDICINE

## 2023-07-20 PROCEDURE — 85007 BL SMEAR W/DIFF WBC COUNT: CPT | Performed by: INTERNAL MEDICINE

## 2023-07-20 PROCEDURE — 82550 ASSAY OF CK (CPK): CPT | Performed by: INTERNAL MEDICINE

## 2023-07-20 PROCEDURE — 80069 RENAL FUNCTION PANEL: CPT | Performed by: INTERNAL MEDICINE

## 2023-07-20 PROCEDURE — 87040 BLOOD CULTURE FOR BACTERIA: CPT | Performed by: INTERNAL MEDICINE

## 2023-07-20 RX ORDER — DEXTROSE AND SODIUM CHLORIDE 5; .45 G/100ML; G/100ML
100 INJECTION, SOLUTION INTRAVENOUS CONTINUOUS
Status: ACTIVE | OUTPATIENT
Start: 2023-07-20 | End: 2023-07-21

## 2023-07-20 RX ADMIN — Medication 10 ML: at 21:42

## 2023-07-20 RX ADMIN — SODIUM BICARBONATE: 84 INJECTION, SOLUTION INTRAVENOUS at 07:39

## 2023-07-20 RX ADMIN — FAMOTIDINE 20 MG: 20 TABLET, FILM COATED ORAL at 21:42

## 2023-07-20 RX ADMIN — Medication 10 ML: at 09:17

## 2023-07-20 RX ADMIN — SENNOSIDES AND DOCUSATE SODIUM 2 TABLET: 50; 8.6 TABLET ORAL at 09:10

## 2023-07-20 RX ADMIN — FAMOTIDINE 20 MG: 20 TABLET, FILM COATED ORAL at 09:10

## 2023-07-20 RX ADMIN — MIRTAZAPINE 15 MG: 15 TABLET, FILM COATED ORAL at 21:42

## 2023-07-20 RX ADMIN — DEXTROSE AND SODIUM CHLORIDE 100 ML/HR: 5; 450 INJECTION, SOLUTION INTRAVENOUS at 14:43

## 2023-07-20 RX ADMIN — CEFEPIME 2000 MG: 2 INJECTION, POWDER, FOR SOLUTION INTRAVENOUS at 00:50

## 2023-07-20 RX ADMIN — ACETAMINOPHEN 650 MG: 325 TABLET, FILM COATED ORAL at 18:43

## 2023-07-20 NOTE — PLAN OF CARE
Goal Outcome Evaluation:  Plan of Care Reviewed With: patient           Outcome Evaluation: F/U for tolerance of current diet.  Pt tolerating current diet of soft to chew with chopped meat and nectar thick liquids.  Baseline cough noted as pt lying in bed prior to noon meal.  Rec continue current diet.  Will monitor cough and appropriateness for upgrade of liquids.

## 2023-07-20 NOTE — PLAN OF CARE
Goal Outcome Evaluation:  Plan of Care Reviewed With: patient        Progress: no change  Outcome Evaluation: Pt disoriented to situation. White in place, catheter care provided. Q2 turns. IVF and IV abx continue. VSS. Will continue to monitor.

## 2023-07-20 NOTE — PROGRESS NOTES
Name: Pedro Peck ADMIT: 2023   : 1950  PCP: Brandon Rosen APRN    MRN: [id number] LOS: 3 days   AGE/SEX: 73 y.o. male  ROOM: [facility]   Subjective   Chief Complaint   Patient presents with    Fall   73-year-old with history of recent prostate surgery with indwelling Edge catheter who presents after being found down by himself for a number of days and found to have significant renal failure, sepsis and suspected urinary tract infection, rhabdo.     On IVFs  On IV ABX  Seen by ST- now on diet    Feeling better every day  BP still is low-normal  Platelets still low but improving       ROS  No f/c  No n/v  No cp/palp  No soa/cough    Objective   Vital Signs  Temp:  [97.5 °F (36.4 °C)-98.2 °F (36.8 °C)] 98.2 °F (36.8 °C)  Heart Rate:  [] 91  Resp:  [18] 18  BP: (110-120)/(76-79) 110/78  SpO2:  [94 %-96 %] 94 %  on   ;   Device (Oxygen Therapy): room air  Body mass index is 20.81 kg/m².    Physical Exam  Constitutional:       General: He is in acute distress.      Appearance: He is ill-appearing.   HENT:      Head: Normocephalic and atraumatic.   Eyes:      General: No scleral icterus.  Cardiovascular:      Rate and Rhythm: Normal rate and regular rhythm.      Heart sounds: Normal heart sounds.   Pulmonary:      Effort: Pulmonary effort is normal. No respiratory distress.      Breath sounds: Normal breath sounds.   Abdominal:      General: There is no distension.      Palpations: Abdomen is soft.      Tenderness: There is no abdominal tenderness.   Musculoskeletal:      Cervical back: Neck supple.   Skin:     Coloration: Skin is pale.   Neurological:      Mental Status: He is alert.   +edge with more clear urine    Results Review:       I reviewed the patient's new clinical results.  Results from last 7 days   Lab Units 23  0727 23  0718 23  0619 23  0026 23  1034   WBC 10*3/mm3 9.99 12.54* 18.42*  --  29.24*   HEMOGLOBIN g/dL 14.4 12.7* 13.1 13.4 14.2    PLATELETS 10*3/mm3 66* 47* 45*  --  53*       Results from last 7 days   Lab Units 07/20/23  0727 07/19/23 0718 07/18/23 0619 07/17/23  1034   SODIUM mmol/L 149* 144 141 141   POTASSIUM mmol/L 3.8 4.6 4.6 5.5*   CHLORIDE mmol/L 110* 113* 111* 105   CO2 mmol/L 28.0 17.0* 18.0* 18.0*   BUN mg/dL 72* 91* 91* 89*   CREATININE mg/dL 2.48* 3.24* 4.51* 6.58*   GLUCOSE mg/dL 95 71 71 69     Estimated Creatinine Clearance: 25.1 mL/min (A) (by C-G formula based on SCr of 2.48 mg/dL (H)).  Results from last 7 days   Lab Units 07/20/23 0727 07/19/23 0718 07/18/23 0619 07/17/23  1034   ALBUMIN g/dL 2.8* 2.3* 2.4* 3.1*   BILIRUBIN mg/dL  --   --   --  1.6*   ALK PHOS U/L  --   --   --  137*   AST (SGOT) U/L  --   --   --  430*   ALT (SGPT) U/L  --   --   --  154*       Results from last 7 days   Lab Units 07/20/23 0727 07/19/23 0718 07/18/23 0619 07/17/23  1034   CALCIUM mg/dL 8.5* 8.1* 8.2* 8.9   ALBUMIN g/dL 2.8* 2.3* 2.4* 3.1*   MAGNESIUM mg/dL 2.1  --   --   --    PHOSPHORUS mg/dL 2.6 2.9 3.7  --        Results from last 7 days   Lab Units 07/18/23  0026 07/17/23  1727 07/17/23  1151   LACTATE mmol/L 1.7 2.5* 2.4*         Coag     HbA1C   Lab Results   Component Value Date    HGBA1C 5.69 (H) 11/10/2021     Infection   Results from last 7 days   Lab Units 07/17/23  1243 07/17/23  1151 07/17/23  1038   BLOODCX  Citrobacter youngae* No growth at 2 days  --    URINECX   --   --  >100,000 CFU/mL Citrobacter amalonaticus*  >100,000 CFU/mL Acinetobacter lwoffii*   BCIDPCR  Enterobacterales spp. Identification by BCID2 PCR.*  --   --        Radiology(recent) No radiology results for the last day  No results found for: TROPONINT, TROPONINI, BNP  No components found for: TSH;2    cefepime, 2,000 mg, Intravenous, Q24H  famotidine, 20 mg, Oral, BID  mirtazapine, 15 mg, Oral, Nightly  senna-docusate sodium, 2 tablet, Oral, BID  sodium chloride, 10 mL, Intravenous, Q12H      custom IV infusion builder, , Last Rate: 100 mL/hr at  07/20/23 0912    Diet: Regular/House Diet; No Straw, No Mixed Consistencies; Texture: Soft to Chew (NDD 3); Soft to Chew: Chopped Meat; Fluid Consistency: Nectar Thick      Assessment & Plan      Active Hospital Problems    Diagnosis  POA    **DERECK (acute kidney injury) [N17.9]  Yes    Sepsis due to Gram negative bacteria [A41.50]  Unknown    Bacteremia [R78.81]  Unknown    Thrombocytopenia [D69.6]  Unknown    Rhabdomyolysis [M62.82]  Unknown    Sepsis secondary to UTI [A41.9, N39.0]  Unknown    Acute cystitis without hematuria [N30.00]  Unknown    Diarrhea [R19.7]  Unknown      Resolved Hospital Problems   No resolved problems to display.   73-year-old with history of recent prostate surgery with indwelling Edge catheter who presents after being found down by himself for a number of days and found to have significant renal failure, sepsis and suspected urinary tract infection, rhabdo.     He was admitted to the hospital given him aggressive IV fluids, IV antibiotics.  Monitor renal function closely and follow-up cultures. On IV cefepime with bactremia  Monitor daily CK. Repeat BC today. Likely can be discharged on oral levaquin based on cultures to finish 7-10 day course.      Thanks to nephrology who is following. No hematuria so no indication for Urology appt at this time- chronic edge (exchanged here) and follows with [facility] who does not come to this hospital.     Thrombocytopenia likely related to sepsis/infection, monitor. Improving but still low= Avoid blood thinners at this time. SCDs for dvt proph     Dispo  Discussion with patient's sister and niece (on 7/17) he has had a significant mental and physical decline over the past few months to year.  He will likely need subacute rehab after stabilization.      He is getting closer to dc but will depend on his renal fx and electrolytes. Perhaps 1-3 days depending on Nephrology thoughts.     DW staff  Reviewed records      Travis Nelson,  MD [facility]  07/20/23  12:50 EDT

## 2023-07-20 NOTE — PROGRESS NOTES
"   LOS: 3 days     Chief Complaint/ Reason for encounter: DERECK with rhabdomyolysis    Subjective   07/20/23 : Seems to be doing a little better.  Still very weak and bedbound  Does seem a bit more awake alert and conversant today  Denies any shortness of breath or chest pain  No nausea vomiting or abdominal pain      Medical history reviewed:  History of Present Illness    Subjective    History taken from: Patient and chart    Vital Signs  Temp:  [97.5 °F (36.4 °C)-98.2 °F (36.8 °C)] 98.2 °F (36.8 °C)  Heart Rate:  [] 91  Resp:  [18] 18  BP: (110-120)/(76-79) 110/78       Wt Readings from Last 1 Encounters:   07/17/23 0957 65.8 kg (145 lb)       Objective:  Vital signs: (most recent): Blood pressure 110/78, pulse 91, temperature 98.2 °F (36.8 °C), temperature source Oral, resp. rate 18, height 177.8 cm (70\"), weight 65.8 kg (145 lb), SpO2 94 %.              Objective:  General Appearance:  Comfortable, chronically ill-appearing, in no acute distress and not in pain.  Awake, alert, oriented  HEENT: Mucous membranes moist, no injury, oropharynx clear  Lungs:  Normal effort and normal respiratory rate.  Breath sounds clear to auscultation.  No  respiratory distress.  No rales, decreased breath sounds or rhonchi.    Heart: Normal rate.  Regular rhythm.  S1, S2 normal.  No murmur.   Abdomen: Abdomen is soft.  Bowel sounds are normal, no abdominal tenderness.  There is no rebound or guarding  Extremities: Minimal edema of bilateral lower extremities  Neurological: No focal motor or sensory deficits, pupils reactive  Skin:  Warm and dry.  No rash or cyanosis.       Results Review:    Intake/Output:     Intake/Output Summary (Last 24 hours) at 7/20/2023 1251  Last data filed at 7/20/2023 0630  Gross per 24 hour   Intake --   Output 1400 ml   Net -1400 ml         DATA:  Radiology and Labs:  The following labs independently reviewed by me. Additional labs ordered for tomorrow a.m.  Interval notes, chart personally " reviewed by me.   Old records independently reviewed showing baseline creatinine looks to be around 1.1-1.2  The following radiologic studies independently viewed by me, findings renal ultrasound showed that the previous right hydronephrosis was no longer seen, innumerable hypoechoic areas suspected to represent parapelvic cysts, no hydronephrosis.  Right renal lesion 2.1 cm  New problems include renal mass/lesion  Discussed with patient    Risk/ complexity of medical care/ medical decision making moderate complexity, severe renal failure and electrolyte management    Labs:   Recent Results (from the past 24 hour(s))   CK    Collection Time: 07/20/23  7:27 AM    Specimen: Blood   Result Value Ref Range    Creatine Kinase 595 (H) 20 - 200 U/L   Renal Function Panel    Collection Time: 07/20/23  7:27 AM    Specimen: Blood   Result Value Ref Range    Glucose 95 65 - 99 mg/dL    BUN 72 (H) 8 - 23 mg/dL    Creatinine 2.48 (H) 0.76 - 1.27 mg/dL    Sodium 149 (H) 136 - 145 mmol/L    Potassium 3.8 3.5 - 5.2 mmol/L    Chloride 110 (H) 98 - 107 mmol/L    CO2 28.0 22.0 - 29.0 mmol/L    Calcium 8.5 (L) 8.6 - 10.5 mg/dL    Albumin 2.8 (L) 3.5 - 5.2 g/dL    Phosphorus 2.6 2.5 - 4.5 mg/dL    Anion Gap 11.0 5.0 - 15.0 mmol/L    BUN/Creatinine Ratio 29.0 (H) 7.0 - 25.0    eGFR 26.9 (L) >60.0 mL/min/1.73   CBC Auto Differential    Collection Time: 07/20/23  7:27 AM    Specimen: Blood   Result Value Ref Range    WBC 9.99 3.40 - 10.80 10*3/mm3    RBC 4.76 4.14 - 5.80 10*6/mm3    Hemoglobin 14.4 13.0 - 17.7 g/dL    Hematocrit 42.8 37.5 - 51.0 %    MCV 89.9 79.0 - 97.0 fL    MCH 30.3 26.6 - 33.0 pg    MCHC 33.6 31.5 - 35.7 g/dL    RDW 13.5 12.3 - 15.4 %    RDW-SD 44.6 37.0 - 54.0 fl    MPV 11.0 6.0 - 12.0 fL    Platelets 66 (L) 140 - 450 10*3/mm3   Magnesium    Collection Time: 07/20/23  7:27 AM    Specimen: Blood   Result Value Ref Range    Magnesium 2.1 1.6 - 2.4 mg/dL   Manual Differential    Collection Time: 07/20/23  7:27 AM     Specimen: Blood   Result Value Ref Range    Neutrophil % 78.8 (H) 42.7 - 76.0 %    Lymphocyte % 9.1 (L) 19.6 - 45.3 %    Monocyte % 10.1 5.0 - 12.0 %    Eosinophil % 2.0 0.3 - 6.2 %    Neutrophils Absolute 7.87 (H) 1.70 - 7.00 10*3/mm3    Lymphocytes Absolute 0.91 0.70 - 3.10 10*3/mm3    Monocytes Absolute 1.01 (H) 0.10 - 0.90 10*3/mm3    Eosinophils Absolute 0.20 0.00 - 0.40 10*3/mm3    RBC Morphology Normal Normal    WBC Morphology Normal Normal    Platelet Morphology Normal Normal       Radiology:  Pertinent radiology studies were reviewed as described above      Medications have been reviewed separately in chart overview      ASSESSMENT:  Severe renal failure secondary to prerenal causes, rhabdo, slowly improving  New hyponatremia  UTI with sepsis, acute cystitis.  On antibiotics  Diarrhea, in isolation for possible C. difficile  Hyperkalemia, better with medical treatment  Metabolic acidosis/lactic acidosis, resolved  Rhabdomyolysis, levels improving  Hypotension, better with fluids  Urinary retention with mild right hydronephrosis, now status post White catheter placement.  No residual hydro on renal ultrasound  Thrombocytopenia, from sepsis.  Improved  Leukocytosis, resolved  Elevated LFTs, possibly muscle source related to rhabdomyolysis  Hyperuricemia from volume depletion  New renal mass       Discussion/plan:  His renal function continues to slowly improve but waste product still significantly above his usual baseline  Acidosis resolved and sodium 149.  Change IV fluids to D5 half-normal  Renal ultrasound shows resolution of hydronephrosis  He did have an indeterminate renal mass seen on renal ultrasound.  Will need CT renal mass protocol with IV contrast once renal function is better.  If creatinine is still above baseline at discharge this can be done as an outpatient  CK levels near normal    Maintain White for now  Continue to monitor electrolytes and volume closely, avoid IV contrast and nephrotoxic  medications      Please send me a secure chat message if any questions regarding patient care.       Brian Farrar MD   [facility]   Office phone number: [phone]  Answering service phone number: [phone]    07/20/23  12:51 EDT    Dictation performed using Dragon dictation software

## 2023-07-20 NOTE — THERAPY TREATMENT NOTE
Acute Care - Speech Language Pathology NICU/PEDS Treatment Note  [facility]       Patient Name: Pedro Peck  : 1950  MRN: [id number]  Today's Date: 2023                   Admit Date: 2023       Visit Dx:      ICD-10-CM ICD-9-CM   1. Traumatic rhabdomyolysis, initial encounter  T79.6XXA 958.6   2. DERECK (acute kidney injury)  N17.9 584.9       Patient Active Problem List   Diagnosis    DERECK (acute kidney injury)    Severe malnutrition    Vitamin D deficiency    Hypophosphatemia    Posterior vitreous detachment    Age-related nuclear cataract of both eyes    Rhabdomyolysis    Sepsis secondary to UTI    Acute cystitis without hematuria    Diarrhea    Sepsis due to Gram negative bacteria    Bacteremia    Thrombocytopenia        Past Medical History:   Diagnosis Date    Autism     Falls         Past Surgical History:   Procedure Laterality Date    PROSTATECTOMY         SLP Recommendation and Plan                                        Plan of Care Review  Plan of Care Reviewed With: patient (23 1603)      Outcome Evaluation: F/U for tolerance of current diet.  Pt tolerating current diet of soft to chew with chopped meat and nectar thick liquids.  Baseline cough noted as pt lying in bed prior to noon meal.  Rec continue current diet.  Will monitor cough and appropriateness for upgrade of liquids. (23 1603)                  EDUCATION  Education completed in the following areas:   Dysphagia (Swallowing Impairment) Modified Diet Instruction.         SLP GOALS       Row Name 23 1130 23 1100          (LTG) Swallow    (LTG) Swallow Safe and efficient swallow with least restrictive diet.  -SA Safe and efficient swallow with least restrictive diet.  -HS     [place] (Swallow Long Term Goal) with minimal cues (75-90% accuracy)  -SA with minimal cues (75-90% accuracy)  -HS     Time Frame (Swallow Long Term Goal) by discharge  -SA by discharge  -HS     Progress/Outcomes (Swallow Long  Term Goal) good progress toward goal  -SA --        (STG) Patient will tolerate trials of    Consistencies Trialed (Tolerate trials) soft to chew (chopped) textures;nectar/ mildly thick liquids  -SA soft to chew (chopped) textures;nectar/ mildly thick liquids  ice chips, small sips of water  -HS     Desired Outcome (Tolerate trials) without signs/symptoms of aspiration  -SA without signs/symptoms of aspiration  -HS     [place] (Tolerate trials) -- with minimal cues (75-90% accuracy)  -HS     Time Frame (Tolerate trials) 1 week  -SA 1 week  -HS     Progress/Outcomes (Tolerate trials) goal met  -SA --        (STG) Swallow 1    (STG) Swallow 1 Pt will tolerate trials of thin liquids for diet upgrade  -SA --     [place] (Swallow Short Term Goal 1) independently (over 90% accuracy)  -SA --     Time Frame (Swallow Short Term Goal 1) by discharge  -SA --               User Key  (r) = Recorded By, (t) = Taken By, (c) = Cosigned By      Initials Name Provider Type    Tressa Juan MS CCC-SLP Speech and Language Pathologist    HS Kathy Benton MS CCC-SLP Speech and Language Pathologist                             Time Calculation:    Time Calculation- SLP       Row Name 07/20/23 1607             Time Calculation- SLP    SLP Start Time 1130  -      SLP Received On 07/20/23  -                User Key  (r) = Recorded By, (t) = Taken By, (c) = Cosigned By      Initials Name Provider Type    Tressa Juan MS CCC-SLP Speech and Language Pathologist                      Therapy Charges for Today       Code Description Service Date Service Provider Modifiers Qty    [id number]  ST TREATMENT SWALLOW 2 7/20/2023 Tressa Roberson MS CCC-SLP GN 1                        MS HELDER Oliveira  7/20/2023

## 2023-07-21 LAB
ALBUMIN SERPL-MCNC: 2.1 G/DL (ref 3.5–5.2)
ANION GAP SERPL CALCULATED.3IONS-SCNC: 6.9 MMOL/L (ref 5–15)
BUN SERPL-MCNC: 51 MG/DL (ref 8–23)
BUN/CREAT SERPL: 28.3 (ref 7–25)
CALCIUM SPEC-SCNC: 8.1 MG/DL (ref 8.6–10.5)
CHLORIDE SERPL-SCNC: 114 MMOL/L (ref 98–107)
CK SERPL-CCNC: 243 U/L (ref 20–200)
CO2 SERPL-SCNC: 23.1 MMOL/L (ref 22–29)
CREAT SERPL-MCNC: 1.8 MG/DL (ref 0.76–1.27)
EGFRCR SERPLBLD CKD-EPI 2021: 39.5 ML/MIN/1.73
GLUCOSE SERPL-MCNC: 124 MG/DL (ref 65–99)
MAGNESIUM SERPL-MCNC: 1.9 MG/DL (ref 1.6–2.4)
PHOSPHATE SERPL-MCNC: 2.5 MG/DL (ref 2.5–4.5)
POTASSIUM SERPL-SCNC: 3.5 MMOL/L (ref 3.5–5.2)
SODIUM SERPL-SCNC: 144 MMOL/L (ref 136–145)

## 2023-07-21 PROCEDURE — 97110 THERAPEUTIC EXERCISES: CPT

## 2023-07-21 PROCEDURE — 83735 ASSAY OF MAGNESIUM: CPT | Performed by: INTERNAL MEDICINE

## 2023-07-21 PROCEDURE — 85025 COMPLETE CBC W/AUTO DIFF WBC: CPT | Performed by: INTERNAL MEDICINE

## 2023-07-21 PROCEDURE — 82550 ASSAY OF CK (CPK): CPT | Performed by: INTERNAL MEDICINE

## 2023-07-21 PROCEDURE — 97535 SELF CARE MNGMENT TRAINING: CPT

## 2023-07-21 PROCEDURE — 0 CEFEPIME PER 500 MG: Performed by: INTERNAL MEDICINE

## 2023-07-21 PROCEDURE — 85007 BL SMEAR W/DIFF WBC COUNT: CPT | Performed by: INTERNAL MEDICINE

## 2023-07-21 PROCEDURE — 0 CEFEPIME PER 500 MG: Performed by: HOSPITALIST

## 2023-07-21 PROCEDURE — 80069 RENAL FUNCTION PANEL: CPT | Performed by: INTERNAL MEDICINE

## 2023-07-21 RX ADMIN — FAMOTIDINE 20 MG: 20 TABLET, FILM COATED ORAL at 09:00

## 2023-07-21 RX ADMIN — FAMOTIDINE 20 MG: 20 TABLET, FILM COATED ORAL at 20:49

## 2023-07-21 RX ADMIN — Medication 10 ML: at 20:50

## 2023-07-21 RX ADMIN — CEFEPIME 2000 MG: 2 INJECTION, POWDER, FOR SOLUTION INTRAVENOUS at 13:54

## 2023-07-21 RX ADMIN — SENNOSIDES AND DOCUSATE SODIUM 2 TABLET: 50; 8.6 TABLET ORAL at 20:49

## 2023-07-21 RX ADMIN — Medication 10 ML: at 09:01

## 2023-07-21 RX ADMIN — CEFEPIME 2000 MG: 2 INJECTION, POWDER, FOR SOLUTION INTRAVENOUS at 00:42

## 2023-07-21 RX ADMIN — ACETAMINOPHEN 650 MG: 325 TABLET, FILM COATED ORAL at 13:54

## 2023-07-21 RX ADMIN — DEXTROSE AND SODIUM CHLORIDE 100 ML/HR: 5; 450 INJECTION, SOLUTION INTRAVENOUS at 01:54

## 2023-07-21 RX ADMIN — MIRTAZAPINE 15 MG: 15 TABLET, FILM COATED ORAL at 20:49

## 2023-07-21 NOTE — PROGRESS NOTES
Name: Pedro Peck ADMIT: 2023   : 1950  PCP: Brandon Rosen APRN    MRN: [id number] LOS: 4 days   AGE/SEX: 73 y.o. male  ROOM: [facility]     Subjective   Subjective   Reports no complaints.  Hoping to go soon       Objective   Objective   Vital Signs  Temp:  [97.7 °F (36.5 °C)-99.7 °F (37.6 °C)] 98.8 °F (37.1 °C)  Heart Rate:  [] 98  Resp:  [18] 18  BP: (103-126)/(63-77) 110/69  SpO2:  [91 %-94 %] 93 %  on   ;   Device (Oxygen Therapy): room air  Body mass index is 20.81 kg/m².  Physical Exam  Vitals reviewed.   Constitutional:       General: He is not in acute distress.     Comments: Chronically ill-appearing   HENT:      Head: Normocephalic and atraumatic.   Eyes:      General: No scleral icterus.  Cardiovascular:      Rate and Rhythm: Normal rate and regular rhythm.   Abdominal:      General: Bowel sounds are normal. There is no distension.      Palpations: Abdomen is soft.   Genitourinary:     Comments: White in place  Skin:     General: Skin is dry.   Neurological:      Mental Status: He is alert.   Psychiatric:         Mood and Affect: Mood normal.     Results Review     I reviewed the patient's new clinical results.  Results from last 7 days   Lab Units 23  0623  0723   WBC 10*3/mm3 10.93* 9.99 12.54* 18.42*   HEMOGLOBIN g/dL 12.4* 14.4 12.7* 13.1   PLATELETS 10*3/mm3 86* 66* 47* 45*     Results from last 7 days   Lab Units 23  0620 23  0723  0723   SODIUM mmol/L 144 149* 144 141   POTASSIUM mmol/L 3.5 3.8 4.6 4.6   CHLORIDE mmol/L 114* 110* 113* 111*   CO2 mmol/L 23.1 28.0 17.0* 18.0*   BUN mg/dL 51* 72* 91* 91*   CREATININE mg/dL 1.80* 2.48* 3.24* 4.51*   GLUCOSE mg/dL 124* 95 71 71   EGFR mL/min/1.73 39.5* 26.9* 19.5* 13.1*     Results from last 7 days   Lab Units 23  0620 23  0727 23  0718 23  0619 23  1034   ALBUMIN g/dL 2.1* 2.8* 2.3* 2.4* 3.1*   BILIRUBIN mg/dL  --    --   --   --  1.6*   ALK PHOS U/L  --   --   --   --  137*   AST (SGOT) U/L  --   --   --   --  430*   ALT (SGPT) U/L  --   --   --   --  154*     Results from last 7 days   Lab Units 07/21/23  0620 07/20/23  0727 07/19/23  0718 07/18/23  0619   CALCIUM mg/dL 8.1* 8.5* 8.1* 8.2*   ALBUMIN g/dL 2.1* 2.8* 2.3* 2.4*   MAGNESIUM mg/dL 1.9 2.1  --   --    PHOSPHORUS mg/dL 2.5 2.6 2.9 3.7     Results from last 7 days   Lab Units 07/18/23  0026 07/17/23  1727 07/17/23  1151   LACTATE mmol/L 1.7 2.5* 2.4*     No results found for: HGBA1C, POCGLU    No radiology results for the last day  I have personally reviewed all medications:  Scheduled Medications  cefepime, 2,000 mg, Intravenous, Q12H  famotidine, 20 mg, Oral, BID  mirtazapine, 15 mg, Oral, Nightly  senna-docusate sodium, 2 tablet, Oral, BID  sodium chloride, 10 mL, Intravenous, Q12H    Infusions  dextrose 5 % and sodium chloride 0.45 %, 100 mL/hr, Last Rate: 100 mL/hr (07/21/23 0154)    Diet  Diet: Regular/House Diet; No Straw, No Mixed Consistencies; Texture: Soft to Chew (NDD 3); Soft to Chew: Chopped Meat; Fluid Consistency: Nectar Thick    I have personally reviewed:  [x]  Laboratory   [x]  Microbiology   [x]  Radiology   []  EKG/Telemetry  []  Cardiology/Vascular   []  Pathology    []  Records       Assessment/Plan     Active Hospital Problems    Diagnosis  POA    **DERECK (acute kidney injury) [N17.9]  Yes    Sepsis due to Gram negative bacteria [A41.50]  Unknown    Bacteremia [R78.81]  Unknown    Thrombocytopenia [D69.6]  Unknown    Rhabdomyolysis [M62.82]  Unknown    Sepsis secondary to UTI [A41.9, N39.0]  Unknown    Acute cystitis without hematuria [N30.00]  Unknown    Diarrhea [R19.7]  Unknown      Resolved Hospital Problems   No resolved problems to display.       73 y.o. male with history of recent prostate surgery and indwelling White catheter admitted after being found down with DERECK (acute kidney injury), UTI and rhabdo    DERECK continues to steadily  improve.  IV fluids DC'd by nephrology.  Repeat BMP in a.m.  - CPK improving  - Continue White catheter until outpatient urology follow-up    UTI/sepsis with urine and blood growing Citrobacter  - Continuing on cefepime but transition to oral levofloxacin in a.m.    Thrombocytopenia presumably due to sepsis.  Appears to be improving steadily, will continue to check daily while here      SCDs for DVT prophylaxis.  Discussed with CCP  Disposition: SNF planned tomorrow if all remains stable    Expected Discharge  Expected Discharge Date: 7/21/2023; Expected Discharge Time:         Edvin Stratton MD  [facility]  07/21/23  16:50 EDT

## 2023-07-21 NOTE — CASE MANAGEMENT/SOCIAL WORK
Continued Stay Note  [facility]     Patient Name: Pedro Peck  MRN: [id number]  Today's Date: 7/21/2023    Admit Date: 7/17/2023    Plan: [facility] via [facility]   Discharge Plan       Row Name 07/21/23 1421       Plan    Plan [facility]    Patient/Family in Agreement with Plan yes    Plan Comments Per [facility] can accept patient and bed available this weekend.  Spoke with Sister/Malena and she is agreeable to DC to [facility] - ambulance disclaimer given.  Packet in cubbie. [facility] scheduled for 7/22 @ 3:00 p.m.   Marcy YU                 Expected Discharge Date and Time       Expected Discharge Date Expected Discharge Time    Jul 21, 2023               Becky S. [place], RN

## 2023-07-21 NOTE — THERAPY TREATMENT NOTE
Patient Name: Pedro Peck  : 1950    MRN: [id number]                              Today's Date: 2023       Admit Date: 2023    Visit Dx:     ICD-10-CM ICD-9-CM   1. Traumatic rhabdomyolysis, initial encounter  T79.6XXA 958.6   2. DERECK (acute kidney injury)  N17.9 584.9     Patient Active Problem List   Diagnosis    DERECK (acute kidney injury)    Severe malnutrition    Vitamin D deficiency    Hypophosphatemia    Posterior vitreous detachment    Age-related nuclear cataract of both eyes    Rhabdomyolysis    Sepsis secondary to UTI    Acute cystitis without hematuria    Diarrhea    Sepsis due to Gram negative bacteria    Bacteremia    Thrombocytopenia     Past Medical History:   Diagnosis Date    Autism     Falls      Past Surgical History:   Procedure Laterality Date    PROSTATECTOMY        General Information       Row Name 23          Physical Therapy Time and Intention    Document Type therapy note (daily note)  -     Mode of Treatment individual therapy;physical therapy  -       Row Name 23          General Information    Patient Profile Reviewed yes  -     Existing Precautions/Restrictions fall  -       Row Name 23          Cognition    Orientation Status (Cognition) oriented to;person;place  needed extra time to answer  -       Row Name 23          Safety Issues, Functional Mobility    Impairments Affecting Function (Mobility) balance;cognition;coordination;endurance/activity tolerance;postural/trunk control;strength  -               User Key  (r) = Recorded By, (t) = Taken By, (c) = Cosigned By      Initials Name Provider Type    Mariana Rogel PTA Physical Therapist Assistant                   Mobility       Row Name 23          Bed Mobility    Supine-Sit [place] (Bed Mobility) moderate assist (50% patient effort);2 person assist  post lean  -       Row Name 23          Sit-Stand Transfer    Sit-Stand  [place] (Transfers) 2 person assist;minimum assist (75% patient effort);verbal cues;nonverbal cues (demo/gesture)  -     Assistive Device (Sit-Stand Transfers) walker, front-wheeled  -       Row Name 07/21/23 1806          Gait/Stairs (Locomotion)    [place] Level (Gait) minimum assist (75% patient effort);moderate assist (50% patient effort);2 person assist  -     Assistive Device (Gait) walker, front-wheeled  -     Distance in Feet (Gait) 20ft, heavy assist to guide rwx, cues to stay on task, pt states he is not feeling as well today-unsure why  -     Deviations/Abnormal Patterns (Gait) kevyn decreased;festinating/shuffling;stride length decreased;scissoring;weight shifting decreased  -     Bilateral Gait Deviations forward flexed posture  -               User Key  (r) = Recorded By, (t) = Taken By, (c) = Cosigned By      Initials Name Provider Type    Mariana Rogel PTA Physical Therapist Assistant                   Obj/Interventions    No documentation.                  Goals/Plan    No documentation.                  Clinical Impression       Row Name 07/21/23 1807          Pain    Pretreatment Pain Rating 5/10  -     Pain Location - Side/Orientation Left  -     Pain Location - head;neck  -     Pain Intervention(s) Rest;Nursing Notified  she was already bringing him meds when we called out  -       Row Name 07/21/23 1807          Plan of Care Review    Plan of Care Reviewed With patient  -     Progress declining  -     Outcome Evaluation Pt required more assist today w/tfers and amb, unsteady, MOD -2 most of session for tfers and amb, still req extra time for processing and cues repeated for same task, he did report not feeling well this session , plans SNU, tolerated amb ~20ft w/rwx  -       Row Name 07/21/23 1807          Therapy Assessment/Plan (PT)    Criteria for Skilled Interventions Met (PT) yes  -       Row Name 07/21/23 1807          Vital Signs    O2  Delivery Pre Treatment room air  -       Row Name 07/21/23 1807          Positioning and Restraints    Pre-Treatment Position in bed  -     Post Treatment Position chair  -JM     In Chair reclined;call light within reach;encouraged to call for assist;exit alarm on;notified nsg  -               User Key  (r) = Recorded By, (t) = Taken By, (c) = Cosigned By      Initials Name Provider Type    Mariana Rogel PTA Physical Therapist Assistant                   Outcome Measures       Row Name 07/21/23 1819          How much help from another person do you currently need...    Turning from your back to your side while in flat bed without using bedrails? 3  -JM     Moving from lying on back to sitting on the side of a flat bed without bedrails? 2  -JM     Moving to and from a bed to a chair (including a wheelchair)? 2  -JM     Standing up from a chair using your arms (e.g., wheelchair, bedside chair)? 2  -JM     Climbing 3-5 steps with a railing? 1  -JM     To walk in hospital room? 2  -JM     AM-PAC 6 Clicks Score (PT) 12  -JM     Highest level of mobility 4 --> Transferred to chair/commode  -       Row Name 07/21/23 1408          Functional Assessment    Outcome Measure Options AM-PAC 6 Clicks Daily Activity (OT)  -KAELYN               User Key  (r) = Recorded By, (t) = Taken By, (c) = Cosigned By      Initials Name Provider Type    Mariana Rogel PTA Physical Therapist Assistant    Berta Keller OT Occupational Therapist                                 Physical Therapy Education       Title: PT OT SLP Therapies (In Progress)       Topic: Physical Therapy (Done)       Point: Mobility training (Done)       Learning Progress Summary             Patient Acceptance, E,TB,D, VU,NR by DINA at 7/21/2023 1819    Acceptance, E,D, VU,NR by DINA at 7/19/2023 1702    Acceptance, E,TB,D, NR,VU by  at 7/18/2023 1525                         Point: Home exercise program (Done)       Learning Progress Summary              Patient Acceptance, E,TB,D, VU,NR by  at 7/21/2023 1819    Acceptance, E,D, VU,NR by  at 7/19/2023 1702    Acceptance, E,TB,D, NR,VU by  at 7/18/2023 1525                         Point: Body mechanics (Done)       Learning Progress Summary             Patient Acceptance, E,TB,D, VU,NR by  at 7/21/2023 1819    Acceptance, E,D, VU,NR by  at 7/19/2023 1702    Acceptance, E,TB,D, NR,VU by  at 7/18/2023 1525                         Point: Precautions (Done)       Learning Progress Summary             Patient Acceptance, E,TB,D, VU,NR by  at 7/21/2023 1819    Acceptance, E,D, VU,NR by  at 7/19/2023 1702    Acceptance, E,TB,D, NR,VU by  at 7/18/2023 1525                                         User Key       Initials Effective Dates Name Provider Type Discipline     03/07/18 -  Mariana Harvey PTA Physical Therapist Assistant PT     04/08/22 -  Maddy La PT Physical Therapist PT                  PT Recommendation and Plan     Plan of Care Reviewed With: patient  Progress: declining  Outcome Evaluation: Pt required more assist today w/tfers and amb, unsteady, MOD -2 most of session for tfers and amb, still req extra time for processing and cues repeated for same task, he did report not feeling well this session , plans SNU, tolerated amb ~20ft w/rwx     Time Calculation:         PT Charges       Row Name 07/21/23 1805             Time Calculation    Start Time 1320  -      Stop Time 1338  -      Time Calculation (min) 18 min  -      PT Received On 07/21/23  -      PT - Next Appointment 07/24/23  -                User Key  (r) = Recorded By, (t) = Taken By, (c) = Cosigned By      Initials Name Provider Type     Mariana Harvey PTA Physical Therapist Assistant                  Therapy Charges for Today       Code Description Service Date Service Provider Modifiers Qty    01074670891 HC PT THER PROC EA 15 MIN 7/21/2023 Mariana Harvey PTA GP 1    70815103820 HC PT THER SUPP EA  15 MIN 7/21/2023 Mariana Harvey, PTA GP 1            PT G-Codes  Outcome Measure Options: AM-PAC 6 Clicks Daily Activity (OT)  AM-PAC 6 Clicks Score (PT): 12  AM-PAC 6 Clicks Score (OT): 16  Modified Lana Scale: 4 - Moderately severe disability.  Unable to walk without assistance, and unable to attend to own bodily needs without assistance.  PT Discharge Summary  Anticipated Discharge Disposition (PT): skilled nursing facility    Mariana Harvey PTA  7/21/2023

## 2023-07-21 NOTE — PLAN OF CARE
Goal Outcome Evaluation:  Plan of Care Reviewed With: patient        Progress: declining  Outcome Evaluation: Pt required more assist today w/tfers and amb, unsteady, MOD -2 most of session for tfers and amb, still req extra time for processing and cues repeated for same task, he did report not feeling well this session , plans SNU, tolerated amb ~20ft w/rwx      Anticipated Discharge Disposition (PT): skilled nursing facility

## 2023-07-21 NOTE — THERAPY TREATMENT NOTE
Patient Name: Pedro Peck  : 1950    MRN: [id number]                              Today's Date: 2023       Admit Date: 2023    Visit Dx:     ICD-10-CM ICD-9-CM   1. Traumatic rhabdomyolysis, initial encounter  T79.6XXA 958.6   2. DERECK (acute kidney injury)  N17.9 584.9     Patient Active Problem List   Diagnosis    DERECK (acute kidney injury)    Severe malnutrition    Vitamin D deficiency    Hypophosphatemia    Posterior vitreous detachment    Age-related nuclear cataract of both eyes    Rhabdomyolysis    Sepsis secondary to UTI    Acute cystitis without hematuria    Diarrhea    Sepsis due to Gram negative bacteria    Bacteremia    Thrombocytopenia     Past Medical History:   Diagnosis Date    Autism     Falls      Past Surgical History:   Procedure Laterality Date    PROSTATECTOMY        General Information       Row Name 23 1348          OT Time and Intention    Document Type therapy note (daily note)  -KAELYN     Mode of Treatment individual therapy;occupational therapy  -       Row Name 23 1348          General Information    Patient Profile Reviewed yes  -KAELYN     Existing Precautions/Restrictions fall  -       Row Name 23 1348          Cognition    Orientation Status (Cognition) oriented to;person;place  -       Row Name 23 1348          Safety Issues, Functional Mobility    Impairments Affecting Function (Mobility) balance;cognition;coordination;endurance/activity tolerance;postural/trunk control;strength  -               User Key  (r) = Recorded By, (t) = Taken By, (c) = Cosigned By      Initials Name Provider Type    Berta Keller OT Occupational Therapist                     Mobility/ADL's       Row Name 23 1348          Bed Mobility    Comment, (Bed Mobility) UIC upon arrival  -       Row Name 23 1348          Sit-Stand Transfer    Comment, (Sit-Stand Transfer) Pt reported he just finished working with PT and politely declined STS. Wanted to  work on UB strength  -[facility] Name 07/21/23 1348          Functional Mobility    Functional Mobility- Ind. Level not tested  -[facility] Name 07/21/23 1348          Activities of Daily Living    BADL Assessment/Intervention feeding;grooming  -[facility] Name 07/21/23 1348          Self-Feeding Assessment/Training    [place] Level (Feeding) feeding skills;prepare tray/open items;set up  -     Position (Self-Feeding) supported sitting  -     Comment, (Feeding) Nectar thick liquids  -[facility] Name 07/21/23 1348          Grooming Assessment/Training    [place] Level (Grooming) grooming skills;wash face, hands;standby assist  -     Position (Grooming) supported sitting  -               User Key  (r) = Recorded By, (t) = Taken By, (c) = Cosigned By      Initials Name Provider Type    Berta Keller OT Occupational Therapist                   Obj/Interventions       [facility] Name 07/21/23 1405          Shoulder (Therapeutic Exercise)    Shoulder (Therapeutic Exercise) AROM (active range of motion);strengthening exercise  -     Shoulder AROM (Therapeutic Exercise) bilateral;flexion;extension;sitting;10 repetitions;3 sets  -     Shoulder Strengthening (Therapeutic Exercise) bilateral;aBduction;aDduction;10 repetitions;yellow;resistance band  cues for proper technique  -[facility] Name 07/21/23 1405          Elbow/Forearm (Therapeutic Exercise)    Elbow/Forearm (Therapeutic Exercise) AROM (active range of motion);strengthening exercise  -     Elbow/Forearm AROM (Therapeutic Exercise) bilateral;flexion;extension;supination;pronation;sitting;10 repetitions;3 sets  -     Elbow/Forearm Strengthening (Therapeutic Exercise) sitting;10 repetitions;yellow;bilateral;flexion;extension  -[facility] Name 07/21/23 1405          Hand (Therapeutic Exercise)    Hand (Therapeutic Exercise) AROM (active range of motion)  -     Hand AROM/AAROM (Therapeutic Exercise) bilateral;AROM (active range of  motion);finger flexion;finger extension;10 repetitions;3 sets  -       Row Name 07/21/23 1410 07/21/23 1405       Motor Skills    Motor Skills functional endurance  -KA --    Functional Endurance fair  -KA --    Therapeutic Exercise -- shoulder;elbow/forearm;hand  -      Row Name 07/21/23 1405          Balance    Comment, Balance --  performed dynamic reaching activity seated in chair. Required rest breaks throughout. CGA for bal  -               User Key  (r) = Recorded By, (t) = Taken By, (c) = Cosigned By      Initials Name Provider Type    Berta Keller, OT Occupational Therapist                   Goals/Plan    No documentation.                  Clinical Impression       Row Name 07/21/23 1407          Pain Assessment    Pain Location - head  -     Pre/Posttreatment Pain Comment unrated pain in head  -     Pain Intervention(s) Medication (See MAR);Repositioned  Nsg provided medication  -       Row Name 07/21/23 1410 07/21/23 1407       Plan of Care Review    Plan of Care Reviewed With -- patient  -    Outcome Evaluation Pt seen for OT session this afternoon. Worked on UB strengthening, endurance and discussed safety with LBD and bathing. Pt reports he has a tub/shower combo with a shower seat at home. Pt performed several UE ther ex to improve overall strength and endurance. Participated in dynamic reaching activity seated in chair with CGA for balance. Pt also was set up to complete feeding task with increased time. Pt continues to benefit from skilled OT to address deficits.  - --      Row Name 07/21/23 1407          Therapy Plan Review/Discharge Plan (OT)    Anticipated Discharge Disposition (OT) skilled nursing facility  -       Row Name 07/21/23 1401          Vital Signs    Pre Patient Position Sitting  -     Intra Patient Position Sitting  -     Post Patient Position Sitting  -       Row Name 07/21/23 1407          Positioning and Restraints    Pre-Treatment Position sitting  in chair/recliner  -KA     Post Treatment Position chair  -KA     In Chair sitting;call light within reach;encouraged to call for assist;exit alarm on;notified nsg  -               User Key  (r) = Recorded By, (t) = Taken By, (c) = Cosigned By      Initials Name Provider Type    Berta Keller OT Occupational Therapist                   Outcome Measures       Row Name 07/21/23 1408          How much help from another is currently needed...    Putting on and taking off regular lower body clothing? 3  -KA     Bathing (including washing, rinsing, and drying) 2  -KA     Toileting (which includes using toilet bed pan or urinal) 2  -KA     Putting on and taking off regular upper body clothing 3  -KA     Taking care of personal grooming (such as brushing teeth) 3  -KA     Eating meals 3  -KA     AM-PAC 6 Clicks Score (OT) 16  -KA       Row Name 07/21/23 1408          Functional Assessment    Outcome Measure Options AM-PAC 6 Clicks Daily Activity (OT)  -KA               User Key  (r) = Recorded By, (t) = Taken By, (c) = Cosigned By      Initials Name Provider Type    Berta Keller OT Occupational Therapist                    Occupational Therapy Education       Title: PT OT SLP Therapies (In Progress)       Topic: Occupational Therapy (In Progress)       Point: ADL training (In Progress)       Description:   Instruct learner(s) on proper safety adaptation and remediation techniques during self care or transfers.   Instruct in proper use of assistive devices.                  Learning Progress Summary             Patient Acceptance, E, NR by MW at 7/18/2023 4409    Comment: role of OT, d/c rec, GOC                         Point: Home exercise program (Not Started)       Description:   Instruct learner(s) on appropriate technique for monitoring, assisting and/or progressing therapeutic exercises/activities.                  Learner Progress:  Not documented in this visit.              Point: Precautions (In  Progress)       Description:   Instruct learner(s) on prescribed precautions during self-care and functional transfers.                  Learning Progress Summary             Patient Acceptance, E, NR by  at 7/18/2023 1538    Comment: role of OT, d/c rec, GOC                         Point: Body mechanics (In Progress)       Description:   Instruct learner(s) on proper positioning and spine alignment during self-care, functional mobility activities and/or exercises.                  Learning Progress Summary             Patient Acceptance, E, NR by  at 7/18/2023 1538    Comment: role of OT, d/c rec, GOC                                         User Key       Initials Effective Dates Name Provider Type Discipline     08/20/21 -  Violet Massey OT Occupational Therapist OT                  OT Recommendation and Plan     Plan of Care Review  Plan of Care Reviewed With: patient  Outcome Evaluation: Pt seen for OT session this afternoon. Worked on UB strengthening, endurance and discussed safety with LBD and bathing. Pt reports he has a tub/shower combo with a shower seat at home. Pt performed several UE ther ex to improve overall strength and endurance. Participated in dynamic reaching activity seated in chair with CGA for balance. Pt also was set up to complete feeding task with increased time. Pt continues to benefit from skilled OT to address deficits.     Time Calculation:         Time Calculation- OT       Row Name 07/21/23 1434             Time Calculation- OT    OT Start Time 1345  -KA      OT Stop Time 1426  -KA      OT Time Calculation (min) 41 min  -KA      Total Timed Code Minutes- OT 41 minute(s)  -KA      OT Received On 07/21/23  -KA      OT - Next Appointment 07/24/23  -KA         Timed Charges    11319 - OT Therapeutic Exercise Minutes 21  -KA      63409 - OT Self Care/Mgmt Minutes 20  -KA         Total Minutes    Timed Charges Total Minutes 41  -KA       Total Minutes 41  -KA                User  Key  (r) = Recorded By, (t) = Taken By, (c) = Cosigned By      Initials Name Provider Type    Berta Keller OT Occupational Therapist                  Therapy Charges for Today       Code Description Service Date Service Provider Modifiers Qty    63680091913  OT THER PROC EA 15 MIN 7/21/2023 Berta Martinez OT GO 2    [id number]  OT SELF CARE/MGMT/TRAIN EA 15 MIN 7/21/2023 Berta Martinez OT GO 1                 Berta Martinez OT  7/21/2023

## 2023-07-21 NOTE — PLAN OF CARE
Goal Outcome Evaluation:  Plan of Care Reviewed With: patient           Outcome Evaluation: Pt seen for OT session this afternoon. Worked on UB strengthening, endurance and discussed safety with LBD and bathing. Pt reports he has a tub/shower combo with a shower seat at home. Pt performed several UE ther ex to improve overall strength and endurance. Participated in dynamic reaching activity seated in chair with CGA for balance. Pt also was set up to complete feeding task with increased time. Pt continues to benefit from skilled OT to address deficits.      Anticipated Discharge Disposition (OT): skilled nursing facility

## 2023-07-21 NOTE — PROGRESS NOTES
"   LOS: 4 days     Chief Complaint/ Reason for encounter: DERECK with rhabdomyolysis    Subjective   07/20/23 : Seems to be doing a little better.  Still very weak and bedbound  Does seem a bit more awake alert and conversant today  Denies any shortness of breath or chest pain  No nausea vomiting or abdominal pain    7/21: No new complaints.  He continues to improve with increased oral intake no nausea  According to his nurse he is \"eating like a horse\"  No edema, good urine output    Medical history reviewed:  History of Present Illness    Subjective    History taken from: Patient and chart    Vital Signs  Temp:  [97.7 °F (36.5 °C)-99.7 °F (37.6 °C)] 97.7 °F (36.5 °C)  Heart Rate:  [] 98  Resp:  [18] 18  BP: (100-126)/(63-79) 108/77       Wt Readings from Last 1 Encounters:   07/17/23 0957 65.8 kg (145 lb)       Objective:  Vital signs: (most recent): Blood pressure 108/77, pulse 98, temperature 97.7 °F (36.5 °C), temperature source Oral, resp. rate 18, height 177.8 cm (70\"), weight 65.8 kg (145 lb), SpO2 93 %.              Objective:  General Appearance:  Comfortable, chronically ill-appearing, in no acute distress and not in pain.  Awake, alert, oriented  HEENT: Mucous membranes moist, no injury, oropharynx clear  Lungs:  Normal effort and normal respiratory rate.  Breath sounds clear to auscultation.  No  respiratory distress.  No rales, decreased breath sounds or rhonchi.    Heart: Normal rate.  Regular rhythm.  S1, S2 normal.  No murmur.   Abdomen: Abdomen is soft.  Bowel sounds are normal, no abdominal tenderness.  There is no rebound or guarding  Extremities: Minimal edema of bilateral lower extremities  Neurological: No focal motor or sensory deficits, pupils reactive  Skin:  Warm and dry.  No rash or cyanosis.       Results Review:    Intake/Output:     Intake/Output Summary (Last 24 hours) at 7/21/2023 1331  Last data filed at 7/20/2023 2140  Gross per 24 hour   Intake --   Output 550 ml   Net -550 ml "           DATA:  Radiology and Labs:  The following labs independently reviewed by me. Additional labs ordered for tomorrow a.m.  Interval notes, chart personally reviewed by me.   Old records independently reviewed showing baseline creatinine looks to be around 1.1-1.2  Discussed with patient    Risk/ complexity of medical care/ medical decision making moderate complexity, severe renal failure and electrolyte management    Labs:   Recent Results (from the past 24 hour(s))   CBC Auto Differential    Collection Time: 07/21/23  6:19 AM    Specimen: Blood   Result Value Ref Range    WBC 10.93 (H) 3.40 - 10.80 10*3/mm3    RBC 4.14 4.14 - 5.80 10*6/mm3    Hemoglobin 12.4 (L) 13.0 - 17.7 g/dL    Hematocrit 36.7 (L) 37.5 - 51.0 %    MCV 88.6 79.0 - 97.0 fL    MCH 30.0 26.6 - 33.0 pg    MCHC 33.8 31.5 - 35.7 g/dL    RDW 13.3 12.3 - 15.4 %    RDW-SD 43.1 37.0 - 54.0 fl    MPV 11.3 6.0 - 12.0 fL    Platelets 86 (L) 140 - 450 10*3/mm3   Manual Differential    Collection Time: 07/21/23  6:19 AM    Specimen: Blood   Result Value Ref Range    Neutrophil % 90.8 (H) 42.7 - 76.0 %    Lymphocyte % 4.1 (L) 19.6 - 45.3 %    Monocyte % 3.1 (L) 5.0 - 12.0 %    Eosinophil % 2.0 0.3 - 6.2 %    Neutrophils Absolute 9.92 (H) 1.70 - 7.00 10*3/mm3    Lymphocytes Absolute 0.45 (L) 0.70 - 3.10 10*3/mm3    Monocytes Absolute 0.34 0.10 - 0.90 10*3/mm3    Eosinophils Absolute 0.22 0.00 - 0.40 10*3/mm3    RBC Morphology Normal Normal    WBC Morphology Normal Normal    Platelet Morphology Normal Normal   CK    Collection Time: 07/21/23  6:20 AM    Specimen: Blood   Result Value Ref Range    Creatine Kinase 243 (H) 20 - 200 U/L   Renal Function Panel    Collection Time: 07/21/23  6:20 AM    Specimen: Blood   Result Value Ref Range    Glucose 124 (H) 65 - 99 mg/dL    BUN 51 (H) 8 - 23 mg/dL    Creatinine 1.80 (H) 0.76 - 1.27 mg/dL    Sodium 144 136 - 145 mmol/L    Potassium 3.5 3.5 - 5.2 mmol/L    Chloride 114 (H) 98 - 107 mmol/L    CO2 23.1 22.0 -  29.0 mmol/L    Calcium 8.1 (L) 8.6 - 10.5 mg/dL    Albumin 2.1 (L) 3.5 - 5.2 g/dL    Phosphorus 2.5 2.5 - 4.5 mg/dL    Anion Gap 6.9 5.0 - 15.0 mmol/L    BUN/Creatinine Ratio 28.3 (H) 7.0 - 25.0    eGFR 39.5 (L) >60.0 mL/min/1.73   Magnesium    Collection Time: 07/21/23  6:20 AM    Specimen: Blood   Result Value Ref Range    Magnesium 1.9 1.6 - 2.4 mg/dL       Radiology:  Pertinent radiology studies were reviewed as described above      Medications have been reviewed separately in chart overview      ASSESSMENT:  Severe renal failure secondary to prerenal causes, rhabdo, slowly improving  New hyponatremia  UTI with sepsis, acute cystitis.  On antibiotics  Diarrhea, in isolation for possible C. difficile  Hyperkalemia, better with medical treatment  Metabolic acidosis/lactic acidosis, resolved  Rhabdomyolysis, levels improving  Hypotension, better with fluids  Urinary retention with mild right hydronephrosis, now status post White catheter placement.  No residual hydro on renal ultrasound  Thrombocytopenia, from sepsis.  Improved  Leukocytosis, resolved  Elevated LFTs, possibly muscle source related to rhabdomyolysis  Hyperuricemia from volume depletion  New renal mass       Discussion/plan:  His renal function continues to slowly normalize  Electrolytes today at goal  CK near normal levels and oral intake has improved  Will discontinue IV fluids after current bag  Recheck chemistries in a.m.    He did have an indeterminate renal mass seen on renal ultrasound.  Will need CT renal mass protocol with IV contrast once renal function is better.  No emergent need to work this up  CK levels near normal, discontinue daily lab checks    Okay to discontinue the White from my standpoint    Discharge planning.  Probably needs rehab at discharge      Brian Farrar MD   [facility]   Office phone number: [phone]  Answering service phone number: [phone]    07/21/23  13:31 EDT    Dictation performed using  Dragon dictation software

## 2023-07-22 VITALS
TEMPERATURE: 98.1 F | HEART RATE: 110 BPM | WEIGHT: 145 LBS | SYSTOLIC BLOOD PRESSURE: 132 MMHG | RESPIRATION RATE: 18 BRPM | HEIGHT: 70 IN | OXYGEN SATURATION: 97 % | BODY MASS INDEX: 20.76 KG/M2 | DIASTOLIC BLOOD PRESSURE: 84 MMHG

## 2023-07-22 PROBLEM — R19.7 DIARRHEA: Status: RESOLVED | Noted: 2023-07-17 | Resolved: 2023-07-22

## 2023-07-22 PROBLEM — D69.6 THROMBOCYTOPENIA: Status: RESOLVED | Noted: 2023-07-18 | Resolved: 2023-07-22

## 2023-07-22 LAB
ALBUMIN SERPL-MCNC: 2.5 G/DL (ref 3.5–5.2)
ANION GAP SERPL CALCULATED.3IONS-SCNC: 9.8 MMOL/L (ref 5–15)
BACTERIA SPEC AEROBE CULT: NORMAL
BUN SERPL-MCNC: 41 MG/DL (ref 8–23)
BUN/CREAT SERPL: 26.5 (ref 7–25)
CALCIUM SPEC-SCNC: 8.7 MG/DL (ref 8.6–10.5)
CHLORIDE SERPL-SCNC: 113 MMOL/L (ref 98–107)
CO2 SERPL-SCNC: 23.2 MMOL/L (ref 22–29)
CREAT SERPL-MCNC: 1.55 MG/DL (ref 0.76–1.27)
DEPRECATED RDW RBC AUTO: 42 FL (ref 37–54)
DEPRECATED RDW RBC AUTO: 43.1 FL (ref 37–54)
EGFRCR SERPLBLD CKD-EPI 2021: 47.3 ML/MIN/1.73
EOSINOPHIL # BLD MANUAL: 0.22 10*3/MM3 (ref 0–0.4)
EOSINOPHIL # BLD MANUAL: 0.41 10*3/MM3 (ref 0–0.4)
EOSINOPHIL NFR BLD MANUAL: 2 % (ref 0.3–6.2)
EOSINOPHIL NFR BLD MANUAL: 3.1 % (ref 0.3–6.2)
ERYTHROCYTE [DISTWIDTH] IN BLOOD BY AUTOMATED COUNT: 13.3 % (ref 12.3–15.4)
ERYTHROCYTE [DISTWIDTH] IN BLOOD BY AUTOMATED COUNT: 13.3 % (ref 12.3–15.4)
GLUCOSE SERPL-MCNC: 112 MG/DL (ref 65–99)
HCT VFR BLD AUTO: 36.7 % (ref 37.5–51)
HCT VFR BLD AUTO: 38.6 % (ref 37.5–51)
HGB BLD-MCNC: 12.4 G/DL (ref 13–17.7)
HGB BLD-MCNC: 13.1 G/DL (ref 13–17.7)
LYMPHOCYTES # BLD MANUAL: 0.45 10*3/MM3 (ref 0.7–3.1)
LYMPHOCYTES # BLD MANUAL: 1.2 10*3/MM3 (ref 0.7–3.1)
LYMPHOCYTES NFR BLD MANUAL: 3.1 % (ref 5–12)
LYMPHOCYTES NFR BLD MANUAL: 7.2 % (ref 5–12)
MCH RBC QN AUTO: 29.7 PG (ref 26.6–33)
MCH RBC QN AUTO: 30 PG (ref 26.6–33)
MCHC RBC AUTO-ENTMCNC: 33.8 G/DL (ref 31.5–35.7)
MCHC RBC AUTO-ENTMCNC: 33.9 G/DL (ref 31.5–35.7)
MCV RBC AUTO: 87.5 FL (ref 79–97)
MCV RBC AUTO: 88.6 FL (ref 79–97)
METAMYELOCYTES NFR BLD MANUAL: 1 % (ref 0–0)
MONOCYTES # BLD: 0.34 10*3/MM3 (ref 0.1–0.9)
MONOCYTES # BLD: 0.94 10*3/MM3 (ref 0.1–0.9)
NEUTROPHILS # BLD AUTO: 10.39 10*3/MM3 (ref 1.7–7)
NEUTROPHILS # BLD AUTO: 9.92 10*3/MM3 (ref 1.7–7)
NEUTROPHILS NFR BLD MANUAL: 79.4 % (ref 42.7–76)
NEUTROPHILS NFR BLD MANUAL: 90.8 % (ref 42.7–76)
PHOSPHATE SERPL-MCNC: 2.5 MG/DL (ref 2.5–4.5)
PLAT MORPH BLD: NORMAL
PLAT MORPH BLD: NORMAL
PLATELET # BLD AUTO: 148 10*3/MM3 (ref 140–450)
PLATELET # BLD AUTO: 86 10*3/MM3 (ref 140–450)
PMV BLD AUTO: 10.8 FL (ref 6–12)
PMV BLD AUTO: 11.3 FL (ref 6–12)
POTASSIUM SERPL-SCNC: 3.7 MMOL/L (ref 3.5–5.2)
RBC # BLD AUTO: 4.14 10*6/MM3 (ref 4.14–5.8)
RBC # BLD AUTO: 4.41 10*6/MM3 (ref 4.14–5.8)
RBC MORPH BLD: NORMAL
RBC MORPH BLD: NORMAL
SODIUM SERPL-SCNC: 146 MMOL/L (ref 136–145)
VARIANT LYMPHS NFR BLD MANUAL: 1 % (ref 0–5)
VARIANT LYMPHS NFR BLD MANUAL: 4.1 % (ref 19.6–45.3)
VARIANT LYMPHS NFR BLD MANUAL: 8.2 % (ref 19.6–45.3)
WBC MORPH BLD: NORMAL
WBC MORPH BLD: NORMAL
WBC NRBC COR # BLD: 10.93 10*3/MM3 (ref 3.4–10.8)
WBC NRBC COR # BLD: 13.09 10*3/MM3 (ref 3.4–10.8)

## 2023-07-22 PROCEDURE — 85025 COMPLETE CBC W/AUTO DIFF WBC: CPT | Performed by: INTERNAL MEDICINE

## 2023-07-22 PROCEDURE — 85007 BL SMEAR W/DIFF WBC COUNT: CPT | Performed by: INTERNAL MEDICINE

## 2023-07-22 PROCEDURE — 0 CEFEPIME PER 500 MG: Performed by: HOSPITALIST

## 2023-07-22 PROCEDURE — 80069 RENAL FUNCTION PANEL: CPT | Performed by: INTERNAL MEDICINE

## 2023-07-22 RX ORDER — LEVOFLOXACIN 750 MG/1
750 TABLET ORAL
Qty: 2 TABLET | Refills: 0 | Status: SHIPPED | OUTPATIENT
Start: 2023-07-23 | End: 2023-07-27

## 2023-07-22 RX ADMIN — Medication 10 ML: at 08:29

## 2023-07-22 RX ADMIN — CEFEPIME 2000 MG: 2 INJECTION, POWDER, FOR SOLUTION INTRAVENOUS at 11:51

## 2023-07-22 RX ADMIN — FAMOTIDINE 20 MG: 20 TABLET, FILM COATED ORAL at 08:26

## 2023-07-22 RX ADMIN — SENNOSIDES AND DOCUSATE SODIUM 2 TABLET: 50; 8.6 TABLET ORAL at 08:26

## 2023-07-22 RX ADMIN — CEFEPIME 2000 MG: 2 INJECTION, POWDER, FOR SOLUTION INTRAVENOUS at 00:34

## 2023-07-22 NOTE — DISCHARGE SUMMARY
Patient Name: Pedro Peck  : 1950  MRN: [id number]    Date of Admission: 2023  Date of Discharge:  2023  Primary Care Physician: Brandon Rosen APRN      Chief Complaint:   Fall      Discharge Diagnoses     Active Hospital Problems    Diagnosis  POA    **DERECK (acute kidney injury) [N17.9]  Yes    Sepsis due to Gram negative bacteria [A41.50]  Yes    Rhabdomyolysis [M62.82]  Unknown    Sepsis secondary to UTI [A41.9, N39.0]  Unknown    Acute cystitis without hematuria [N30.00]  Unknown      Resolved Hospital Problems    Diagnosis Date Resolved POA    Thrombocytopenia [D69.6] 2023 Unknown    Diarrhea [R19.7] 2023 Unknown        Hospital Course     Mr. Peck is a 73 y.o. male with a history of recent TURP for BPH with continued indwelling White catheter who presented to [facility] after being found down by family.  Please see the admitting history and physical for further details.  He was found to have severe acute renal failure, rhabdomyolysis and probable UTI.  He was started on IV fluids and antibiotics.  Blood and urine cultures on admission grew Citrobacter.  CT of the abdomen showed some lower thoracic lymphadenopathy, parapelvic cyst on the left kidney and renal atrophy with hydronephrosis on the right along with thick walled bladder suggestive of outlet obstruction.  White catheter was changed.  Renal ultrasound was performed which did show possible right renal mass as detailed in the radiology section below. Renal function steadily improved over the course of his hospitalization and has trended downward to 1.55 by the time of discharge.  He has been followed by Dr. Farrar who will continue to monitor things in the outpatient setting.  I discussed care with Dr. Farrar today who is okay with discharge and will likely order either MRI or CT of the kidneys in the outpatient setting if labs continue to improve.      In regards to infection his white  blood cell count was 29,000 on admission and trended downward nicely with antibiotic treatment although over the last couple of days it has drifted back up slightly to 13.  Despite this he has had no fevers or other signs of decompensation and is actually looking and feeling much better.  Would recommend completion of his antibiotic course and then potentially rechecking labs next week.  Also note that he does have some eosinophilia on his differential which should also be reevaluated.  He has no other signs of allergy, no rash etc.  We will transition to oral Levaquin for the remaining 4 days of his course as per sensitivities and he is stable for discharge to skilled nursing facility today.  Also he had some severe thrombocytopenia on admission which I suspect was related to sepsis which has now improved.  He continues with White catheter and is being followed by [facility] urology.  He has already failed 2 previous attempts at voiding trials since surgery.      Day of Discharge     Subjective:  Feels pretty good.  Denies any complaints.    Physical Exam:  Temp:  [98.4 °F (36.9 °C)-99.3 °F (37.4 °C)] 98.4 °F (36.9 °C)  Heart Rate:  [102-108] 102  Resp:  [18-20] 20  BP: (102-147)/(68-78) 147/78  Body mass index is 20.81 kg/m².  Physical Exam  Vitals reviewed.   Constitutional:       General: He is not in acute distress.     Comments: Chronically ill-appearing   HENT:      Head: Normocephalic and atraumatic.   Eyes:      General: No scleral icterus.  Cardiovascular:      Rate and Rhythm: Normal rate and regular rhythm.   Abdominal:      General: Bowel sounds are normal. There is no distension.      Palpations: Abdomen is soft.   Genitourinary:     Comments: White in place  Skin:     General: Skin is dry.   Neurological:      Mental Status: He is alert.   Psychiatric:         Mood and Affect: Mood normal.       Consultants     Consult Orders (all) (From admission, onward)       Start     Ordered    07/17/23 5224   Inpatient Nephrology Consult  Once        Specialty:  Nephrology  Provider:  Brian Farrar MD    07/17/23 1543    07/17/23 1543  Inpatient Nephrology Consult  Once,   Status:  Canceled        Specialty:  Nephrology  Provider:  (Not yet assigned)    07/17/23 1542    07/17/23 1125  [facility] (on-call MD unless specified) Details  Once        Specialty:  Hospitalist  Provider:  (Not yet assigned)    07/17/23 1124                  Procedures       Imaging Results (All)       Procedure Component Value Units Date/Time    US Renal Bilateral [[id number]] Collected: 07/18/23 0915     Updated: 07/18/23 1759    Narrative:      RENAL ULTRASOUND     HISTORY: Acute renal failure     COMPARISON: CT abdomen pelvis 07/17/2023 and renal sonogram 11/09/2021.     TECHNIQUE: Grayscale, color Doppler images of the kidneys and bladder  were obtained.      FINDINGS: Evaluation is suboptimal due to patient body habitus.     Grayscale and color Doppler images of the kidneys and bladder were  obtained.     The right kidney measures 9.3 cm in length.      The left kidney measures 14.2 cm in length.      Right kidney demonstrates severe asymmetric atrophy. Previously seen  right-sided hydronephrosis is no longer seen. Extensive dilated cystic  areas over the left renal pelvis are present and likely correspond with  the extensive left-sided parapelvic renal cysts.. A complex hypoechoic  lesion within the midpole the right kidney measures 2.1 x 1.8 x 1.5 cm  and was not definitely seen on recent CT abdomen or pelvis.     The bladder is decompressed by White catheter and cannot be evaluated.     Visualized portions of the aorta and IVC are normal in caliber and  appearance.       Impression:      1.  Previously seen right sided hydronephrosis is no longer visualized.  Innumerable hypoechoic rounded areas over the left renal pelvis likely  and at least in part correspond with extensive parapelvic renal cyst  seen on prior CT. While no definite  hydronephrosis is seen, the extent  of parapelvic cysts significantly limits evaluation. If there is  continued clinical concern for renal obstruction, further evaluation  with CT of the abdomen and pelvis is recommended to better characterize.  2.  Complex septated right renal lesion measuring up to 2.1 cm,  indeterminate. Further evaluation multiphase CT or MRI abdomen with and  without contrast recommended to better characterize and exclude  neoplasm.  3.  Other findings above.     This report was finalized on 7/18/2023 5:56 PM by Dr. Jeffry Hedrick M.D.       CT Head Without Contrast [[id number]] Collected: 07/17/23 1109     Updated: 07/17/23 1628    Narrative:      CT HEAD WITHOUT CONTRAST     HISTORY: Found on floor.     COMPARISON: None.     FINDINGS: Patient's head is canted in the scanner. There is  age-appropriate atrophy. Mild-to-moderate vascular calcifications  involving the carotid siphons are noted. There is no evidence of  hemorrhage, hydrocephalus or of a focal area of decreased attenuation to  suggest acute infarction. Further evaluation could be performed with MRI  examination of brain as indicated.           Radiation dose reduction techniques were utilized, including automated  exposure control and exposure modulation based on body size.     This report was finalized on 7/17/2023 4:25 PM by Dr. Mamadou Aguila M.D.       CT Abdomen Pelvis Without Contrast [[id number]] Collected: 07/17/23 1329     Updated: 07/17/23 1336    Narrative:      EXAMINATION: CT OF THE ABDOMEN AND PELVIS WITHOUT CONTRAST     TECHNIQUE: Computed tomography of the abdomen and pelvis without  intravenous contrast per protocol. Radiation dose reduction techniques  were utilized, including automated exposure control and exposure  modulation based on body size.      HISTORY: Hematuria     COMPARISON: None available     FINDINGS: Limited evaluation of the inferior thorax demonstrates  atelectasis, without consolidation, pleural  effusion, or pneumothorax.  The heart is normal in size, without pericardial effusion. Enlarged  lymph nodes are seen near the distal esophagus, measuring up to 1.2 cm  in short axis diameter on series 2, image 6.     The examination is limited by patient motion. Likely parapelvic cysts  are seen in the left kidney. The right kidney is atrophic. There is  urinary bladder distention and trabeculation. Mild right-sided  hydronephrosis is seen. No left-sided hydronephrosis is demonstrated.  There is layering material in the urinary bladder. The urinary bladder  is markedly distended.     The liver, spleen, adrenal glands, pancreas, stomach, small bowel, large  bowel, and abdominal vasculature are normal as evaluated on this  noncontrast examination. There are right greater than left  fat-containing inguinal hernias. No intraperitoneal fluid collection or  free gas are seen. No enlarged lymph nodes are demonstrated in the  abdomen or pelvis.     Bone windows demonstrate degenerative changes, without suspicious  osseous lesion seen.       Impression:      1. Lower thoracic lymphadenopathy. Correlate with history and physical  exam  2. Limited examination of the kidneys demonstrate parapelvic cyst on the  left and renal atrophy with mild hydronephrosis on the right  3. Layering material in the urinary bladder. Correlate with urinalysis  4. Thick-walled, trabeculated urinary bladder, suggestive of outlet  obstruction  5. Urinary bladder distention. Consider catheterization  6. Additional incidental findings as above.     This report was finalized on 7/17/2023 1:33 PM by Dr. Eran Jansen M.D.                 Pertinent Labs     Results from last 7 days   Lab Units 07/22/23  0611 07/21/23  0619 07/20/23  0727 07/19/23  0718   WBC 10*3/mm3 13.09* 10.93* 9.99 12.54*   HEMOGLOBIN g/dL 13.1 12.4* 14.4 12.7*   PLATELETS 10*3/mm3 148 86* 66* 47*     Results from last 7 days   Lab Units 07/22/23  0611 07/21/23  0620 07/20/23  0727  07/19/23 0718   SODIUM mmol/L 146* 144 149* 144   POTASSIUM mmol/L 3.7 3.5 3.8 4.6   CHLORIDE mmol/L 113* 114* 110* 113*   CO2 mmol/L 23.2 23.1 28.0 17.0*   BUN mg/dL 41* 51* 72* 91*   CREATININE mg/dL 1.55* 1.80* 2.48* 3.24*   GLUCOSE mg/dL 112* 124* 95 71   EGFR mL/min/1.73 47.3* 39.5* 26.9* 19.5*     Results from last 7 days   Lab Units 07/22/23 0611 07/21/23 0620 07/20/23 0727 07/19/23 0718 07/18/23 0619 07/17/23  1034   ALBUMIN g/dL 2.5* 2.1* 2.8* 2.3*   < > 3.1*   BILIRUBIN mg/dL  --   --   --   --   --  1.6*   ALK PHOS U/L  --   --   --   --   --  137*   AST (SGOT) U/L  --   --   --   --   --  430*   ALT (SGPT) U/L  --   --   --   --   --  154*    < > = values in this interval not displayed.     Results from last 7 days   Lab Units 07/22/23 0611 07/21/23 0620 07/20/23 0727 07/19/23 0718   CALCIUM mg/dL 8.7 8.1* 8.5* 8.1*   ALBUMIN g/dL 2.5* 2.1* 2.8* 2.3*   MAGNESIUM mg/dL  --  1.9 2.1  --    PHOSPHORUS mg/dL 2.5 2.5 2.6 2.9       Results from last 7 days   Lab Units 07/21/23 0620 07/20/23 0727 07/19/23 0718 07/18/23 0619   CK TOTAL U/L 243* 595* 1,437* 3,059*     Results from last 7 days   Lab Units 07/18/23 0619 07/18/23  0045   SODIUM UR mmol/L  --  36   CREATININE UR mg/dL  --  96.3   CHLORIDE UR mmol/L  --  30   URINE MYOGLOBIN, QUALITATIVE   --  Positive*   URIC ACID mg/dL 9.8*  --          Invalid input(s): LDLCALC  Results from last 7 days   Lab Units 07/20/23  0734 07/20/23  0727 07/17/23  1243 07/17/23  1151 07/17/23  1038   BLOODCX  No growth at 2 days No growth at 2 days Citrobacter youngae* No growth at 4 days  --    URINECX   --   --   --   --  >100,000 CFU/mL Citrobacter amalonaticus*  >100,000 CFU/mL Acinetobacter lwoffii*   BCIDPCR   --   --  Enterobacterales spp. Identification by BCID2 PCR.*  --   --          Test Results Pending at Discharge     Pending Labs       Order Current Status    Blood Culture - Blood, Arm, Left Preliminary result    Blood Culture - Blood, Arm, Left  Preliminary result    Blood Culture - Blood, Arm, Right Preliminary result            Discharge Details        Discharge Medications        New Medications        Instructions Start Date   levoFLOXacin 750 MG tablet  Commonly known as: Levaquin   750 mg, Oral, Every 48 Hours   Start Date: July 23, 2023            Continue These Medications        Instructions Start Date   famotidine 20 MG tablet  Commonly known as: PEPCID   20 mg, Oral, 2 Times Daily      mirtazapine 15 MG tablet  Commonly known as: REMERON   15 mg, Oral, Nightly               No Known Allergies    Discharge Disposition:  Skilled Nursing Facility (DC - External)      Discharge Diet:  Diet Order   Procedures    Diet: Regular/House Diet; No Straw, No Mixed Consistencies; Texture: Soft to Chew (NDD 3); Soft to Chew: Chopped Meat; Fluid Consistency: Nectar Thick       Discharge Activity:       CODE STATUS:    Code Status and Medical Interventions:   Ordered at: 07/17/23 1217     Code Status (Patient has no pulse and is not breathing):    CPR (Attempt to Resuscitate)     Medical Interventions (Patient has pulse or is breathing):    Full Support       No future appointments.   Contact information for follow-up providers       Brandon Rosen APRN Follow up in 1 week(s).    Specialty: Nurse Practitioner  Contact information:  [address] [facility] [id number] [phone]               Brian Farrar MD Follow up in 2 week(s).    Specialty: Nephrology  Contact information:  [address] [id number] [phone]                       Contact information for after-discharge care       Destination       [facility] .    Service: Skilled Nursing  Contact information:  [address] [facility] [address] [phone]                                   Time Spent on Discharge:  Greater than 30 minutes      Edvin Stratotn MD  [facility]  07/22/23  10:01  EDT

## 2023-07-22 NOTE — PROGRESS NOTES
"   LOS: 5 days     Chief Complaint/ Reason for encounter: DERECK with rhabdomyolysis    Subjective   07/20/23 : Seems to be doing a little better.  Still very weak and bedbound  Does seem a bit more awake alert and conversant today  Denies any shortness of breath or chest pain  No nausea vomiting or abdominal pain    7/21: No new complaints.  He continues to improve with increased oral intake no nausea  According to his nurse he is \"eating like a horse\"  No edema, good urine output    7/22: No complaints he looks and feels well  Eating and drinking better no shortness of breath or chest pain    Medical history reviewed:  History of Present Illness    Subjective    History taken from: Patient and chart    Vital Signs  Temp:  [98.4 °F (36.9 °C)-99.3 °F (37.4 °C)] 98.4 °F (36.9 °C)  Heart Rate:  [] 102  Resp:  [18-20] 20  BP: (102-147)/(68-78) 147/78       Wt Readings from Last 1 Encounters:   07/17/23 0957 65.8 kg (145 lb)       Objective:  Vital signs: (most recent): Blood pressure 147/78, pulse 102, temperature 98.4 °F (36.9 °C), temperature source Oral, resp. rate 20, height 177.8 cm (70\"), weight 65.8 kg (145 lb), SpO2 94 %.              Objective:  General Appearance:  Comfortable, chronically ill-appearing, in no acute distress and not in pain.  Awake, alert, oriented  HEENT: Mucous membranes moist, no injury, oropharynx clear  Lungs:  Normal effort and normal respiratory rate.  Breath sounds clear to auscultation.  No  respiratory distress.  No rales, decreased breath sounds or rhonchi.    Heart: Normal rate.  Regular rhythm.  S1, S2 normal.  No murmur.   Abdomen: Abdomen is soft.  Bowel sounds are normal, no abdominal tenderness.  There is no rebound or guarding  Extremities: Minimal edema of bilateral lower extremities  Neurological: No focal motor or sensory deficits, pupils reactive  Skin:  Warm and dry.  No rash or cyanosis.       Results Review:    Intake/Output:     Intake/Output Summary (Last 24 " hours) at 7/22/2023 0854  Last data filed at 7/22/2023 0557  Gross per 24 hour   Intake --   Output 2600 ml   Net -2600 ml           DATA:  Radiology and Labs:  The following labs independently reviewed by me. Additional labs ordered for tomorrow a.m.  Interval notes, chart personally reviewed by me.   Old records independently reviewed showing baseline creatinine looks to be around 1.1-1.2  Discussed with patient    Risk/ complexity of medical care/ medical decision making moderate complexity, severe renal failure and electrolyte management    Labs:   Recent Results (from the past 24 hour(s))   Renal Function Panel    Collection Time: 07/22/23  6:11 AM    Specimen: Blood   Result Value Ref Range    Glucose 112 (H) 65 - 99 mg/dL    BUN 41 (H) 8 - 23 mg/dL    Creatinine 1.55 (H) 0.76 - 1.27 mg/dL    Sodium 146 (H) 136 - 145 mmol/L    Potassium 3.7 3.5 - 5.2 mmol/L    Chloride 113 (H) 98 - 107 mmol/L    CO2 23.2 22.0 - 29.0 mmol/L    Calcium 8.7 8.6 - 10.5 mg/dL    Albumin 2.5 (L) 3.5 - 5.2 g/dL    Phosphorus 2.5 2.5 - 4.5 mg/dL    Anion Gap 9.8 5.0 - 15.0 mmol/L    BUN/Creatinine Ratio 26.5 (H) 7.0 - 25.0    eGFR 47.3 (L) >60.0 mL/min/1.73   CBC Auto Differential    Collection Time: 07/22/23  6:11 AM    Specimen: Blood   Result Value Ref Range    WBC 13.09 (H) 3.40 - 10.80 10*3/mm3    RBC 4.41 4.14 - 5.80 10*6/mm3    Hemoglobin 13.1 13.0 - 17.7 g/dL    Hematocrit 38.6 37.5 - 51.0 %    MCV 87.5 79.0 - 97.0 fL    MCH 29.7 26.6 - 33.0 pg    MCHC 33.9 31.5 - 35.7 g/dL    RDW 13.3 12.3 - 15.4 %    RDW-SD 42.0 37.0 - 54.0 fl    MPV 10.8 6.0 - 12.0 fL    Platelets 148 140 - 450 10*3/mm3   Manual Differential    Collection Time: 07/22/23  6:11 AM    Specimen: Blood   Result Value Ref Range    Neutrophil % 79.4 (H) 42.7 - 76.0 %    Lymphocyte % 8.2 (L) 19.6 - 45.3 %    Monocyte % 7.2 5.0 - 12.0 %    Eosinophil % 3.1 0.3 - 6.2 %    Metamyelocyte % 1.0 (H) 0.0 - 0.0 %    Atypical Lymphocyte % 1.0 0.0 - 5.0 %    Neutrophils  Absolute 10.39 (H) 1.70 - 7.00 10*3/mm3    Lymphocytes Absolute 1.20 0.70 - 3.10 10*3/mm3    Monocytes Absolute 0.94 (H) 0.10 - 0.90 10*3/mm3    Eosinophils Absolute 0.41 (H) 0.00 - 0.40 10*3/mm3    RBC Morphology Normal Normal    WBC Morphology Normal Normal    Platelet Morphology Normal Normal       Radiology:  Pertinent radiology studies were reviewed as described above      Medications have been reviewed separately in chart overview      ASSESSMENT:  Acute renal failure, prerenal from rhabdo  Hyponatremia, slightly worse today  UTI with sepsis, acute cystitis.  On antibiotics  Diarrhea, in isolation for possible C. difficile  Hyperkalemia, better with medical treatment  Metabolic acidosis/lactic acidosis, resolved  Rhabdomyolysis, levels improving  Hypotension, better with fluids  Urinary retention with mild right hydronephrosis, now status post White catheter placement.  No residual hydro on renal ultrasound  Thrombocytopenia, from sepsis.  Improved  Leukocytosis, resolved  Elevated LFTs, possibly muscle source related to rhabdomyolysis  Hyperuricemia from volume depletion  New renal mass       Discussion/plan:  His renal function continues to slowly normalize  Electrolytes today at goal  Sodium up slightly.  Encouraged the patient increase his oral fluid intake  If sodium is any worse tomorrow we will resume IV fluids  CK near normal levels and oral intake has improved    We will plan CT renal mass protocol tomorrow if GFR above 50 with premedication with IV fluids  Keep White in place until he follows up with urology as outpatient  Discharge planning, likely needs rehab at discharge      Brian Farrar MD   [facility]   Office phone number: [phone]  Answering service phone number: [phone]    07/22/23  08:54 EDT    Dictation performed using Dragon dictation software

## 2023-07-24 NOTE — CASE MANAGEMENT/SOCIAL WORK
Case Management Discharge Note      Final Note: DC'd to skilled bed at [facility] 7/22 via [facility]    Provided Post Acute Provider List?: N/A  N/A Provider List Comment: Lisa works at [facility]    Selected Continued Care - Discharged on 7/22/2023 Admission date: 7/17/2023 - Discharge disposition: Skilled Nursing Facility (DC - External)      Destination Coordination complete.      Service Provider Selected Services Address Phone Fax Patient Preferred    [facility] [address] [place] [id number] [phone] --                          Transportation Services  Ambulance: [facility]    Final Discharge Disposition Code: 03 - skilled nursing facility (SNF)

## 2023-07-25 LAB
BACTERIA SPEC AEROBE CULT: NORMAL
BACTERIA SPEC AEROBE CULT: NORMAL

## 2024-06-24 ENCOUNTER — TRANSCRIBE ORDERS (OUTPATIENT)
Dept: ADMINISTRATIVE | Facility: HOSPITAL | Age: 74
End: 2024-06-24
Payer: MEDICARE

## 2024-06-24 ENCOUNTER — LAB (OUTPATIENT)
Dept: LAB | Facility: HOSPITAL | Age: 74
End: 2024-06-24
Payer: MEDICARE

## 2024-06-24 DIAGNOSIS — E55.9 AVITAMINOSIS D: ICD-10-CM

## 2024-06-24 DIAGNOSIS — M62.82 NON-TRAUMATIC RHABDOMYOLYSIS: ICD-10-CM

## 2024-06-24 DIAGNOSIS — N30.01 ACUTE HEMORRHAGIC CYSTITIS: ICD-10-CM

## 2024-06-24 DIAGNOSIS — N17.0 ACUTE KIDNEY FAILURE WITH LESION OF TUBULAR NECROSIS: ICD-10-CM

## 2024-06-24 DIAGNOSIS — N17.0 ACUTE KIDNEY FAILURE WITH LESION OF TUBULAR NECROSIS: Primary | ICD-10-CM

## 2024-06-24 LAB
25(OH)D3 SERPL-MCNC: 25.5 NG/ML (ref 30–100)
ALBUMIN SERPL-MCNC: 3.8 G/DL (ref 3.5–5.2)
ANION GAP SERPL CALCULATED.3IONS-SCNC: 11.7 MMOL/L (ref 5–15)
BACTERIA UR QL AUTO: ABNORMAL /HPF
BILIRUB UR QL STRIP: NEGATIVE
BUN SERPL-MCNC: 20 MG/DL (ref 8–23)
BUN/CREAT SERPL: 10.9 (ref 7–25)
CALCIUM SPEC-SCNC: 9.3 MG/DL (ref 8.6–10.5)
CHLORIDE SERPL-SCNC: 107 MMOL/L (ref 98–107)
CLARITY UR: ABNORMAL
CO2 SERPL-SCNC: 22.3 MMOL/L (ref 22–29)
COLOR UR: YELLOW
CREAT SERPL-MCNC: 1.83 MG/DL (ref 0.76–1.27)
CREAT UR-MCNC: 60 MG/DL
EGFRCR SERPLBLD CKD-EPI 2021: 38.5 ML/MIN/1.73
GLUCOSE SERPL-MCNC: 98 MG/DL (ref 65–99)
GLUCOSE UR STRIP-MCNC: NEGATIVE MG/DL
HGB UR QL STRIP.AUTO: ABNORMAL
HYALINE CASTS UR QL AUTO: ABNORMAL /LPF
KETONES UR QL STRIP: NEGATIVE
LEUKOCYTE ESTERASE UR QL STRIP.AUTO: ABNORMAL
NITRITE UR QL STRIP: POSITIVE
PH UR STRIP.AUTO: 6 [PH] (ref 5–8)
PHOSPHATE SERPL-MCNC: 2.4 MG/DL (ref 2.5–4.5)
POTASSIUM SERPL-SCNC: 4.3 MMOL/L (ref 3.5–5.2)
PROT ?TM UR-MCNC: 142.8 MG/DL
PROT UR QL STRIP: ABNORMAL
PROT/CREAT UR: 2380 MG/G CREA (ref 0–200)
RBC # UR STRIP: ABNORMAL /HPF
REF LAB TEST METHOD: ABNORMAL
SODIUM SERPL-SCNC: 141 MMOL/L (ref 136–145)
SP GR UR STRIP: 1.01 (ref 1–1.03)
SQUAMOUS #/AREA URNS HPF: ABNORMAL /HPF
UROBILINOGEN UR QL STRIP: ABNORMAL
WBC # UR STRIP: ABNORMAL /HPF

## 2024-06-24 PROCEDURE — 81001 URINALYSIS AUTO W/SCOPE: CPT

## 2024-06-24 PROCEDURE — 84156 ASSAY OF PROTEIN URINE: CPT

## 2024-06-24 PROCEDURE — 82306 VITAMIN D 25 HYDROXY: CPT

## 2024-06-24 PROCEDURE — 36415 COLL VENOUS BLD VENIPUNCTURE: CPT

## 2024-06-24 PROCEDURE — 80069 RENAL FUNCTION PANEL: CPT

## 2024-06-24 PROCEDURE — 82570 ASSAY OF URINE CREATININE: CPT
